# Patient Record
Sex: FEMALE | Race: WHITE | NOT HISPANIC OR LATINO | Employment: UNEMPLOYED | URBAN - METROPOLITAN AREA
[De-identification: names, ages, dates, MRNs, and addresses within clinical notes are randomized per-mention and may not be internally consistent; named-entity substitution may affect disease eponyms.]

---

## 2022-04-07 ENCOUNTER — HISTORICAL (OUTPATIENT)
Dept: ADMINISTRATIVE | Facility: HOSPITAL | Age: 57
End: 2022-04-07

## 2022-04-24 VITALS
BODY MASS INDEX: 25.58 KG/M2 | OXYGEN SATURATION: 98 % | DIASTOLIC BLOOD PRESSURE: 112 MMHG | WEIGHT: 139 LBS | HEIGHT: 62 IN | SYSTOLIC BLOOD PRESSURE: 160 MMHG

## 2023-10-13 DIAGNOSIS — C51.9 VULVAR CANCER: Primary | ICD-10-CM

## 2023-10-27 ENCOUNTER — TELEPHONE (OUTPATIENT)
Dept: HEMATOLOGY/ONCOLOGY | Facility: CLINIC | Age: 58
End: 2023-10-27
Payer: MEDICAID

## 2023-10-27 NOTE — NURSING
ROCKY Kay spoke with patient for pre-visit introduction and to assist with any questions or concerns. Discussed arrival time, location of clinic, and what to expect at consult visit. Provided contact number to call. Confirmed that patient has [employer] benefits and will bring her insurance card with her. No concerns or needs at this time. Confirmed that patient plans to attend appointment for 11/01/23 at 10:40 am.

## 2023-10-31 PROBLEM — Z80.42 FAMILY HISTORY OF PROSTATE CANCER IN FATHER: Status: ACTIVE | Noted: 2023-10-31

## 2023-10-31 PROBLEM — Z80.41 FAMILY HISTORY OF OVARIAN CANCER: Status: ACTIVE | Noted: 2023-10-31

## 2023-10-31 PROBLEM — Z72.0 TOBACCO ABUSE: Status: ACTIVE | Noted: 2023-10-31

## 2023-10-31 PROBLEM — Z90.710 HISTORY OF HYSTERECTOMY: Status: ACTIVE | Noted: 2023-10-31

## 2023-10-31 PROBLEM — E03.9 HYPOTHYROIDISM: Status: ACTIVE | Noted: 2023-10-31

## 2023-10-31 PROBLEM — C51.9 SQUAMOUS CELL CARCINOMA OF VULVA: Status: ACTIVE | Noted: 2023-10-31

## 2023-10-31 PROBLEM — Z86.39 HISTORY OF GRAVES' DISEASE: Status: ACTIVE | Noted: 2023-10-31

## 2023-10-31 PROBLEM — R59.0 INGUINAL LYMPHADENOPATHY: Status: ACTIVE | Noted: 2023-10-31

## 2023-10-31 NOTE — PROGRESS NOTES
History:  History reviewed. No pertinent past medical history.    Past Surgical History:   Procedure Laterality Date    HYSTERECTOMY        Social History     Socioeconomic History    Marital status:    Tobacco Use    Smoking status: Every Day     Types: Cigarettes    Smokeless tobacco: Never   Substance and Sexual Activity    Alcohol use: Not Currently    Drug use: Never    Sexual activity: Not Currently   Past medical history:  Rasheeda-Parkinson-White syndrome.  Graves disease, treated with radioactive iodine in 1999 or 2000, resulting in iatrogenic hypothyroidism; subsequently, on thyroid replacement therapy.  Hypertension.  Anxiety.  Tobacco abuse.  History of abnormal Pap smears in the past; no regular follow-up.  According to her, history of abnormal Pap smears, HPV 16 positive    Surgical/procedure history:  Hysterectomy for AUB/fibroids at age 37.     Social history:  Single.  Has 3 children.  Lives in [place], [place].  Used to work as a ; does not work anymore.  Has been smoking < half a pack of cigarettes daily for 10-15 years.  Used to drink 3 beers 3 X a week; now, down to or 3 beers once a week.  No illicit drugs.    Family history:  Daughter experienced ovarian cancer at age 31.    Health maintenance:  PCP in [place], [place].  -04/25/2023: Bilateral screening mammogram (comparison:  None):  BI-RADS 1-negative  -no screening colonoscopy ever.    Gyn history:  Pregnancy x3.  Vaginal birth x3.  Hysterectomy for AUB/fibroids at age 37.      History reviewed. No pertinent family history.     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        Oncologic/Hematologic History:  Oncology History   Squamous cell carcinoma of vulva   10/31/2023 Initial Diagnosis    Squamous cell carcinoma of vulva     10/31/2023 Cancer Staged    Staging form: Vulva, AJCC 8th Edition  - Clinical stage from 10/31/2023: FIGO Stage IIIB, calculated as Stage LINH (cT3, cN2b, cM0)     2023 -  Chemotherapy    Treatment Summary   Plan Name: OP GYN CISPLATIN WEEKLY  Treatment Goal: Curative  Status: Active  Start Date: 2023 (Planned)  End Date: 2023 (Planned)  Provider: Wai Verduzco MD  Chemotherapy: CISplatin (Platinol) 40 mg/m2 in sodium chloride 0.9% 250 mL chemo infusion, 40 mg/m2, Intravenous, Clinic/HOD 1 time, 0 of 7 cycles     58-year-old lady, referred by Dr. Harjinder Dumont, gyn onc, [facility], [place], [place], with squamous cell carcinoma of vulva.      09/15/2023:  Gyn office note (Jacinda Berger MD, gyn):  Left vulvar mass for 1 year; growth over last 6 months; pain; rubs on clothes  History of hysterectomy in the past  Noticed mass 1 year ago; was caring for her mother who was bedridden; now, ; noticed the mass growing; pain in the area; no problems with bowels or urination  History of hysterectomy for AUB/fibroids.  History of abnormal Pap smears for years.  Smoker.  Not sexually active in sometime.  Last gyn/ exam was shortly after hysterectomy at age 37.   Exam:  Friable, atypical borders, tender, entire left labia replaced by tumor/condylomatous appearance in several areas; right labia no lesion; left labia tenderness, lesions, and skin changes; inguinal lymphadenopathy present on the left side; no inguinal lymphadenopathy on the right side; unable to placed speculum in vagina due to tenderness at  enteritis      09/29/2023:  Dr. Harjinder Dumont, gyn onc office note (referred by Dr. Jacinda Berger, gyn):  Vaginal pain, 4/10  Left vulvar tender mass.  She has been providing care for her mother for the last year, and therefore, allowed the mass to grow without evaluation because of her focus on caregiving.  Gyn assessment 09/15/2023 showed a large mass suspicious for cancer.  Biopsy was done on 09/21/2023, revealing squamous cell carcinoma.  Other than the direct vulvar discomfort related to the mass, she denied significant bleeding, discharge, altered bladder or bowel function, leg swelling, weight loss, or anorexia.  The mass was quite tender and painful with sitting and activities, and very tender to touch.  No history of vulvar lesions or intraepithelial neoplasia  Physical exam (Dr. Harjinder Dumont):  Very large vulvar mass consistent with squamous cell carcinoma involving most of the mid and posterior left labial structures with areas of ulceration; is zone of induration around the mass that extends into the subcutaneous tissues; no fixation to the underlying fascia or bone; the mass extends around the perineal body to involve the right side of the perineal region; the distal vagina also appears involved by the tumor; the mass is difficult to assess because with the patient's tenderness; the mass also extends posteriorly to involve the left anterior portion of the anus; no extension into the deeper tissues; overall size 8 cm x 5 cm, and thickness difficult to clarify; vaginal exam was not feasible because of the size of tumor and the performed tenderness at the patient was experiencing  Assessment and plan (Dr. Harjinder Maynard):  Large squamous cell carcinoma of the vulva; T2 with involvement of the distal vagina and exterior anus; quite large; not usually considered best treated with surgery as the initial approach; standard therapy would be chemoradiation therapy; if there is any residual tumor remaining after  initial therapy, then, surgical resection would be considered.  Pelvic and vulvar MRI and PET-CT scan recommended.      Investigations reviewed:    2023:  Vulva, biopsy ([facility]):  -squamous cell carcinoma, extending to the deep and peripheral margins    10/20/2023: MRI pelvis with and without contrast (staging)   1. Biopsy-proven malignant left vulvar mass involves the the left perineum and lower two thirds of the vagina. Abutment of the anterior rectal wall is indeterminate for invasion.   2. Left inguinal adenopathy, compatible with metastatic disease  (enhancing soft tissue mass involving the left perineum, left vulva, and the lower 2/3 of vagina, measuring 8.0 x 2.5 by 5.4 cm; loss of normal fat plane between mass in the anterior rectum, indeterminate for invasion; no evidence of urethral invasion; 2 adjacent morphologically abnormal rounded and enlarged left inguinal lymph nodes present, largest 2.0 cm; a 3rd lymph node similar appearing the nonenlarged left inguinal lymph node)    10/31/2023: FDG PET-CT for stagin. Hypermetabolic activity at the bulb on volume portion of the vaginal canal although some of this activity may reflect urinary contamination. This is the site of the primary lesion.   2. Hypermetabolic adenopathy within the left inguinal node chain.   (No intrapelvic lymphadenopathy is seen. There is a large hypermetabolic lymph node within the left inguinal chain measuring 2.4 cm and SUV max of 3.4. A smaller hypermetabolic lymph node is seen superficially adjacent to it measuring SUV max 2.3. Hypermetabolic activity at the vulva involving a portion of the vaginal canal is identified, SUV max measuring 11.8. Some of this activity may reflect urinary contamination. The SUV max of the urinary bladder is 10.2.)    2023:   Pleasant lady who presents for initial medical oncology consultation, accompanied by her father.  In no acute discomfort.  Intermittent  pain in the left vulvar mass.  On Norco 7.5 mg p.o. PRN; she takes half a pill 3 X daily.  It was prescribed by Dr. Familia Red, radiation oncologist.  No ulceration over the mass.  Occasional spotting.  No vaginal bleeding.    Also has experienced itching over the vulvar mass.  Anorexia and 20 lb weight loss over last 3 months.  No unusual headaches, focal neurological symptoms, chest pain, cough, dyspnea, any other lumps or lymphadenopathy, abdominal pain, nausea, vomiting, any urinary or bowel symptoms, etc..  ECOG 0.    Interval History:  [No matching plan found]   OP GYN CISPLATIN WEEKLY     Medications:  Current Outpatient Medications on File Prior to Visit   Medication Sig Dispense Refill    ALPRAZolam (XANAX) 0.25 MG tablet Take 0.25 mg by mouth daily as needed.      diltiaZEM (CARDIZEM CD) 180 MG 24 hr capsule Take 180 mg by mouth.      HYDROcodone-acetaminophen (NORCO) 7.5-325 mg per tablet Take 1 tablet by mouth 4 (four) times daily as needed.      levothyroxine (SYNTHROID) 125 MCG tablet Take 125 mcg by mouth.      LIDOcaine (XYLOCAINE) 5 % Oint ointment Apply pea sized amount to affected area q4hrs prn vulvar pain      LIDOcaine-prilocaine (EMLA) cream SMARTSI Topical Daily      LIDOcaine-prilocaine (EMLA) cream Apply topically daily as needed.      lisinopriL (PRINIVIL,ZESTRIL) 5 MG tablet Take 5 mg by mouth.      lisinopriL 10 MG tablet Take 10 mg by mouth.      ALPRAZolam (XANAX) 0.25 MG tablet Take 1 tablet by mouth daily as needed.      amoxicillin-clavulanate 875-125mg (AUGMENTIN) 875-125 mg per tablet Take 1 tablet by mouth 2 (two) times daily.      diltiaZEM (CARDIZEM CD) 180 MG 24 hr capsule Take 1 capsule by mouth every morning.      HYDROcodone-acetaminophen (NORCO) 5-325 mg per tablet Take by mouth.      HYDROcodone-acetaminophen (NORCO) 5-325 mg per tablet Take 1 tablet by mouth every 4 (four) hours as needed.      levothyroxine (SYNTHROID) 125 MCG tablet Take 1 tablet by mouth every  morning.      levothyroxine sodium (SYNTHROID ORAL) Synthroid Take No date recorded No form recorded No frequency recorded No route recorded No set duration recorded No set duration amount recorded active No dosage strength recorded No dosage strength units of measure recorded      lisinopriL 10 MG tablet Take 10 mg by mouth.      VITAMIN D2 1,250 mcg (50,000 unit) capsule Take 50,000 Units by mouth every 7 days.       No current facility-administered medications on file prior to visit.       Review of Systems:   All systems reviewed and found to be negative except for the symptoms detailed above    Physical Examination:   VITAL SIGNS:   Vitals:    11/01/23 1104   BP: 105/67   Pulse: 62   Resp: 20   Temp: 97.8 °F (36.6 °C)     GENERAL:  In no apparent distress.    HEAD:  No signs of head trauma.  EYES:  Pupils are equal.  Extraocular motions intact.    EARS:  Hearing grossly intact.  MOUTH:  Oropharynx is normal.   NECK:  No adenopathy, no JVD.     CHEST:  Chest with clear breath sounds bilaterally.  No wheezes, rales, rhonchi.    CARDIAC:  Regular rate and rhythm.  S1 and S2, without murmurs, gallops, rubs.  VASCULAR:  No Edema.  Peripheral pulses normal and equal in all extremities.  ABDOMEN:  Soft, without detectable tenderness.  No sign of distention.  No   rebound or guarding, and no masses palpated.   Bowel Sounds normal.  MUSCULOSKELETAL:  Good range of motion of all major joints. Extremities without clubbing, cyanosis or edema.    NEUROLOGIC EXAM:  Alert and oriented x 3.  No focal sensory or strength deficits.   Speech normal.  Follows commands.  PSYCHIATRIC:  Mood normal.  -11/01/2023: I did not perform pelvic examination.    No results found for this or any previous visit.  No results found for this or any previous visit.    Assessment:  Problem List Items Addressed This Visit          Renal/    History of hysterectomy       Oncology    Squamous cell carcinoma of vulva    Family history of prostate  cancer in father    Family history of ovarian cancer       Endocrine    History of Graves' disease    Hypothyroidism       Other    Tobacco abuse    Inguinal lymphadenopathy     Other Visit Diagnoses       Vulvar cancer              Squamous cell carcinoma of vulva:  -presentation:  Left vulvar painful, tender mass for 1 year before seeking care in 09/2023  -vulvar biopsy 09/21/2023: Squamous cell carcinoma, extending to the deep and peripheral margins  -gyn exam 09/15/2023:  Friable, atypical borders, tender, entire left labia replaced by tumor/condylomatous appearance in several areas; left inguinal lymphadenopathy  -Gyn Onc exam 09/29/2023:  Very large vulvar mass involving the mid and posterior left labial structures with areas of ulceration, etc.; no fixation; distal vagina involved; involve the left anterior portion of the anus as well; no extension into deeper tissue; 8 cm x 5 cm;  -MRI pelvis with and without contrast 10/20/2023:  Left vulvar mass involving the left perineum and lower 2/3 of the vagina; 8.0 x 2.5 x 5.4 cm; no urethral invasion; abutment of the anterior rectal wall is indeterminate for invasion; left inguinal lymphadenopathy (2 abnormal lymph nodes, largest 2.0 cm; a 3rd lymph node similar appearance nonenlarged)  -PET-CT 10/31/2023:  No distant metastases; no intrapelvic lymphadenopathy; 2.4 cm hypermetabolic left inguinal lymph node; additionally, hypermetabolic lymph node adjacent to it as well; primary tumor, hypermetabolic, involving a portion of the vaginal canal)  >>>  -squamous cell carcinoma of the vulva (left labia)  -disease involving lower 2/3 of the vagina on MRI  -left inguinal lymphadenopathy (regional lymphadenopathy), 2.0 cm on MRI pelvis, 2.4 cm on PET-CT (> 5 mm), therefore, FIGO stage IIIB      Father experience prostate cancer at age 70   Daughter experienced ovarian cancer at age 31      History of hysterectomy at age 37 for AUB/uterine fibroids  History of Graves  disease, treated; subsequently, hypothyroid  History of tobacco abuse         Plan:  Check CBC, CMP, HIV screen  Please order genetic testidaughter experienced ovarian cancer at age 31)  Start Megace 400 mg p.o. q.day to boost appetite.  Follow-up in 1 month, after inguinofemoral lymphadenectomy by Dr. Harjinder Dumont.  ---------------------      -11/01/2023: Anorexia, 20 lb weight loss in last 3 months   -start Megace 400 mg p.o. q.day    -squamous cell carcinoma of the vulva (left labia)  -disease involving lower 2/3 of the vagina on MRI  -left inguinal lymphadenopathy (regional lymphadenopathy), 2.0 cm on MRI pelvis, 2.4 cm on PET-CT (> 5 mm), therefore, FIGO stage IIIB  -locally advanced disease; unresectable  -radiologically suspicious nodes (left inguinal lymphadenopathy on MRI and every PET-CT)    Primary treatment:   1. Inguinofemoral lymphadenectomy; if positive lymph nodes, then, EBRT +concurrent chemotherapy to primary tumor/inguinofemoral lymph node/pelvic lymph nodes  2. Inguinofemoral lymphadenectomy; if negative lymph node, then, EBRT +concurrent chemotherapy to primary tumor (+/- selective inguinofemoral lymph node coverage)  3. If inguinofemoral lymphadenectomy not performed then:  Consider FNA for enlarged lymph nodes, followed by EBRT +concurrent chemotherapy to primary tumor/inguinofemoral lymph nodes/pelvic lymph nodes    >>>  -11/01/2023: Message from Dr. Harjinder Dumont:  He wishes to perform inguinofemoral lymphadenectomy  -therefore, we will defer chemoradiation therapy until after she heals up from surgery  -we will see her back for follow-up in 1 month    Followed by evaluation of response to EBRT +concurrent chemotherapy:   Consider FDG PET-CT at 3-6 months to assess treatment response after definitive primary treatment  1. If clinically negative for residual tumor at primary site and nodes, then: Surveillance  2. If clinically negative for residual tumor at primary site and nodes, then:  Consider biopsy of tumor bed to confirm pathologic complete response (no sooner than 3 months after completion of treatment); if biopsy negative, then, surveillance; if biopsy positive, then, resection, followed by surveillance for negative margins, and consideration of additional surgery (consider pelvic exenteration for selected cases with a central recurrence), additional EBRT and/or systemic therapy for positive margins for invasive disease   3. If clinically positive for residual tumor at primary site and/or nodes, then:  -resection, followed by surveillance for negative margins  -resection, followed by additional surgery/additional EBRT, N/or systemic therapy for positive margins for invasive disease  -if unresectable, then, consider additional EBRT and/or systemic therapy or best supportive care      Surveillance:  1. Interval history and physical every 3-6 months for 2 years, then every 6-12 months for 3-5 years, then annually based on patient's risk of disease recurrence  2. Cervical/vaginal cytology screening for detection of lower genital tract neoplasia (may include HPV testing)  3. Imaging as indicated based on symptoms or examination findings suspicious for recurrence  4. Laboratory assessment as indicated based on symptoms or examination findings suspicious for recurrence  5. Patient Education regarding symptoms of potential recurrence and vulvar dystrophy, periodic self examinations, lifestyle, obesity, exercise, 6 health (including vaginal dilator use and lubricant/moisturizers), smoking cessation, nutrition counseling, and potential long-term and late effects of treatment     Preferred chemotherapy for chemoradiation therapy:  -cisplatin  -carboplatin if patient cisplatin intolerant  -other recommended regimens: Cisplatin/5 FU; capecitabine/mitomycin; gemcitabine; paclitaxel    Cisplatin, concurrent with radiotherapy:  Cisplatin 40 mg per m2 IV day 1, 7 day cycle x7 weeks with concurrent  radiotherapy    Monitor for hypersensitivity reactions, electrolyte abnormalities (hypomagnesemia, hypokalemia), renal dysfunction, peripheral neuropathy, ototoxicity.    Consider HIV testing    Imagin. Initial workup:  Chest x-ray/chest CT without contrast; pelvic MRI to aid in surgical and/or radiation treatment planning; consider neck/chest/abdomen/pelvis/groin FDG PET-CT or CT C/A/P with contrast for T2 or larger tumors or metastases suspected, etc.    Follow-up/surveillance imaging:  CT C/A/P with contrast or neck/chest/abdomen/pelvis/groin FDG PET-CT if recurrence/metastases suspected  Consider FDG PET-CT at 3-6 months to assess treatment response after definitive primary treatment    Imaging for suspected or documented recurrence:   Consider neck/chest/abdomen/pelvis/groin FDG PET-CT if not previously performed during surveillance  Consider pelvic MRI to aid in further treatment planning    Daughter experienced ovarian cancer age 31  >>>  -will consider genetic testing    History of tobacco abuse  -importance of complete and permanent abstinence discussed and strongly emphasized    History of Graves disease, treated with radioactive iodine in 1990, resulting in iatrogenic hypothyroidism; subsequently, on thyroid replacement therapy   Follow-up with PCP    Follow-up in 1 month, after inguinofemoral lymphadenectomy by Dr. Harjinder Dumont.    Above discussed at length with the patient.  All questions answered.  Discussed labs, scans, pathology report, and staging of vulvar cancer, and gave her copies of relevant records.    Plan of management discussed in detail.  Potential side effects of cisplatin discussed; [facility]  In due course, she will have chemotherapy teaching session with nursing staff, as well, before starting chemoradiation therapy.  Treatment will be deferred until after inguinofemoral lymphadenectomy by Dr. CAROLIN simms.  She understands and agrees with  this plan.    Follow-up:  No follow-ups on file.      Answers submitted by the patient for this visit:  Review of Systems Questionnaire (Submitted on 10/31/2023)  appetite change : No  unexpected weight change: No  mouth sores: No  visual disturbance: No  cough: No  shortness of breath: No  chest pain: No  abdominal pain: No  diarrhea: No  frequency: No  back pain: No  rash: No  headaches: No  adenopathy: No  nervous/ anxious: No

## 2023-11-01 ENCOUNTER — OFFICE VISIT (OUTPATIENT)
Dept: HEMATOLOGY/ONCOLOGY | Facility: CLINIC | Age: 58
End: 2023-11-01
Attending: INTERNAL MEDICINE
Payer: MEDICAID

## 2023-11-01 ENCOUNTER — DOCUMENTATION ONLY (OUTPATIENT)
Dept: HEMATOLOGY/ONCOLOGY | Facility: CLINIC | Age: 58
End: 2023-11-01

## 2023-11-01 VITALS
HEART RATE: 62 BPM | SYSTOLIC BLOOD PRESSURE: 105 MMHG | BODY MASS INDEX: 22.78 KG/M2 | HEIGHT: 62 IN | WEIGHT: 123.81 LBS | DIASTOLIC BLOOD PRESSURE: 67 MMHG | OXYGEN SATURATION: 100 % | RESPIRATION RATE: 20 BRPM | TEMPERATURE: 98 F

## 2023-11-01 DIAGNOSIS — Z80.41 FAMILY HISTORY OF OVARIAN CANCER: ICD-10-CM

## 2023-11-01 DIAGNOSIS — Z80.42 FAMILY HISTORY OF PROSTATE CANCER IN FATHER: ICD-10-CM

## 2023-11-01 DIAGNOSIS — Z86.39 HISTORY OF GRAVES' DISEASE: ICD-10-CM

## 2023-11-01 DIAGNOSIS — C51.9 VULVAR CANCER: ICD-10-CM

## 2023-11-01 DIAGNOSIS — R59.0 INGUINAL LYMPHADENOPATHY: ICD-10-CM

## 2023-11-01 DIAGNOSIS — C51.9 SQUAMOUS CELL CARCINOMA OF VULVA: Primary | ICD-10-CM

## 2023-11-01 DIAGNOSIS — Z72.0 TOBACCO ABUSE: ICD-10-CM

## 2023-11-01 DIAGNOSIS — Z90.710 HISTORY OF HYSTERECTOMY: ICD-10-CM

## 2023-11-01 DIAGNOSIS — R63.0 ANOREXIA: ICD-10-CM

## 2023-11-01 DIAGNOSIS — C51.9 SQUAMOUS CELL CARCINOMA OF VULVA: ICD-10-CM

## 2023-11-01 DIAGNOSIS — R63.4 UNINTENTIONAL WEIGHT LOSS: ICD-10-CM

## 2023-11-01 DIAGNOSIS — E03.9 HYPOTHYROIDISM, UNSPECIFIED TYPE: ICD-10-CM

## 2023-11-01 LAB
ALBUMIN SERPL-MCNC: 3.4 G/DL (ref 3.5–5)
ALBUMIN/GLOB SERPL: 0.9 RATIO (ref 1.1–2)
ALP SERPL-CCNC: 79 UNIT/L (ref 40–150)
ALT SERPL-CCNC: 7 UNIT/L (ref 0–55)
AST SERPL-CCNC: 11 UNIT/L (ref 5–34)
BASOPHILS # BLD AUTO: 0.09 X10(3)/MCL
BASOPHILS NFR BLD AUTO: 1.2 %
BILIRUB SERPL-MCNC: 0.4 MG/DL
BUN SERPL-MCNC: 6.4 MG/DL (ref 9.8–20.1)
CALCIUM SERPL-MCNC: 9.6 MG/DL (ref 8.4–10.2)
CHLORIDE SERPL-SCNC: 107 MMOL/L (ref 98–107)
CO2 SERPL-SCNC: 26 MMOL/L (ref 22–29)
CREAT SERPL-MCNC: 0.77 MG/DL (ref 0.55–1.02)
EOSINOPHIL # BLD AUTO: 0.29 X10(3)/MCL (ref 0–0.9)
EOSINOPHIL NFR BLD AUTO: 3.9 %
ERYTHROCYTE [DISTWIDTH] IN BLOOD BY AUTOMATED COUNT: 13.2 % (ref 11.5–17)
GFR SERPLBLD CREATININE-BSD FMLA CKD-EPI: >60 MLS/MIN/1.73/M2
GLOBULIN SER-MCNC: 3.9 GM/DL (ref 2.4–3.5)
GLUCOSE SERPL-MCNC: 84 MG/DL (ref 74–100)
HCT VFR BLD AUTO: 43.6 % (ref 37–47)
HGB BLD-MCNC: 13.7 G/DL (ref 12–16)
HIV 1+2 AB+HIV1 P24 AG SERPL QL IA: NONREACTIVE
IMM GRANULOCYTES # BLD AUTO: 0.03 X10(3)/MCL (ref 0–0.04)
IMM GRANULOCYTES NFR BLD AUTO: 0.4 %
LYMPHOCYTES # BLD AUTO: 1.63 X10(3)/MCL (ref 0.6–4.6)
LYMPHOCYTES NFR BLD AUTO: 21.7 %
MCH RBC QN AUTO: 29.2 PG (ref 27–31)
MCHC RBC AUTO-ENTMCNC: 31.4 G/DL (ref 33–36)
MCV RBC AUTO: 93 FL (ref 80–94)
MONOCYTES # BLD AUTO: 0.72 X10(3)/MCL (ref 0.1–1.3)
MONOCYTES NFR BLD AUTO: 9.6 %
NEUTROPHILS # BLD AUTO: 4.76 X10(3)/MCL (ref 2.1–9.2)
NEUTROPHILS NFR BLD AUTO: 63.2 %
NRBC BLD AUTO-RTO: 0 %
PLATELET # BLD AUTO: 374 X10(3)/MCL (ref 130–400)
PMV BLD AUTO: 8.8 FL (ref 7.4–10.4)
POTASSIUM SERPL-SCNC: 4.5 MMOL/L (ref 3.5–5.1)
PROT SERPL-MCNC: 7.3 GM/DL (ref 6.4–8.3)
RBC # BLD AUTO: 4.69 X10(6)/MCL (ref 4.2–5.4)
SODIUM SERPL-SCNC: 140 MMOL/L (ref 136–145)
WBC # SPEC AUTO: 7.52 X10(3)/MCL (ref 4.5–11.5)

## 2023-11-01 PROCEDURE — 3074F SYST BP LT 130 MM HG: CPT | Mod: CPTII,,, | Performed by: INTERNAL MEDICINE

## 2023-11-01 PROCEDURE — 3078F PR MOST RECENT DIASTOLIC BLOOD PRESSURE < 80 MM HG: ICD-10-PCS | Mod: CPTII,,, | Performed by: INTERNAL MEDICINE

## 2023-11-01 PROCEDURE — 1159F PR MEDICATION LIST DOCUMENTED IN MEDICAL RECORD: ICD-10-PCS | Mod: CPTII,,, | Performed by: INTERNAL MEDICINE

## 2023-11-01 PROCEDURE — 1160F PR REVIEW ALL MEDS BY PRESCRIBER/CLIN PHARMACIST DOCUMENTED: ICD-10-PCS | Mod: CPTII,,, | Performed by: INTERNAL MEDICINE

## 2023-11-01 PROCEDURE — 99205 OFFICE O/P NEW HI 60 MIN: CPT | Mod: S$PBB,,, | Performed by: INTERNAL MEDICINE

## 2023-11-01 PROCEDURE — 4010F PR ACE/ARB THEARPY RXD/TAKEN: ICD-10-PCS | Mod: CPTII,,, | Performed by: INTERNAL MEDICINE

## 2023-11-01 PROCEDURE — 3008F BODY MASS INDEX DOCD: CPT | Mod: CPTII,,, | Performed by: INTERNAL MEDICINE

## 2023-11-01 PROCEDURE — 1160F RVW MEDS BY RX/DR IN RCRD: CPT | Mod: CPTII,,, | Performed by: INTERNAL MEDICINE

## 2023-11-01 PROCEDURE — 1159F MED LIST DOCD IN RCRD: CPT | Mod: CPTII,,, | Performed by: INTERNAL MEDICINE

## 2023-11-01 PROCEDURE — 3008F PR BODY MASS INDEX (BMI) DOCUMENTED: ICD-10-PCS | Mod: CPTII,,, | Performed by: INTERNAL MEDICINE

## 2023-11-01 PROCEDURE — 85025 COMPLETE CBC W/AUTO DIFF WBC: CPT | Performed by: INTERNAL MEDICINE

## 2023-11-01 PROCEDURE — 4010F ACE/ARB THERAPY RXD/TAKEN: CPT | Mod: CPTII,,, | Performed by: INTERNAL MEDICINE

## 2023-11-01 PROCEDURE — 99214 OFFICE O/P EST MOD 30 MIN: CPT | Mod: PBBFAC | Performed by: INTERNAL MEDICINE

## 2023-11-01 PROCEDURE — 80053 COMPREHEN METABOLIC PANEL: CPT | Performed by: INTERNAL MEDICINE

## 2023-11-01 PROCEDURE — 3074F PR MOST RECENT SYSTOLIC BLOOD PRESSURE < 130 MM HG: ICD-10-PCS | Mod: CPTII,,, | Performed by: INTERNAL MEDICINE

## 2023-11-01 PROCEDURE — 99205 PR OFFICE/OUTPT VISIT, NEW, LEVL V, 60-74 MIN: ICD-10-PCS | Mod: S$PBB,,, | Performed by: INTERNAL MEDICINE

## 2023-11-01 PROCEDURE — 3078F DIAST BP <80 MM HG: CPT | Mod: CPTII,,, | Performed by: INTERNAL MEDICINE

## 2023-11-01 PROCEDURE — 87389 HIV-1 AG W/HIV-1&-2 AB AG IA: CPT | Performed by: INTERNAL MEDICINE

## 2023-11-01 PROCEDURE — 36415 COLL VENOUS BLD VENIPUNCTURE: CPT | Performed by: INTERNAL MEDICINE

## 2023-11-01 RX ORDER — LEVOTHYROXINE SODIUM 125 UG/1
125 TABLET ORAL
COMMUNITY
Start: 2023-10-31 | End: 2024-03-07

## 2023-11-01 RX ORDER — ERGOCALCIFEROL 1.25 MG/1
50000 CAPSULE ORAL
COMMUNITY
Start: 2023-06-23 | End: 2024-03-07

## 2023-11-01 RX ORDER — LIDOCAINE AND PRILOCAINE 25; 25 MG/G; MG/G
CREAM TOPICAL DAILY PRN
COMMUNITY
Start: 2023-09-15 | End: 2024-03-07 | Stop reason: SDUPTHER

## 2023-11-01 RX ORDER — ALPRAZOLAM 0.25 MG/1
0.25 TABLET ORAL DAILY PRN
COMMUNITY
Start: 2023-09-26 | End: 2024-03-07

## 2023-11-01 RX ORDER — LIDOCAINE 50 MG/G
OINTMENT TOPICAL
COMMUNITY
Start: 2023-09-15

## 2023-11-01 RX ORDER — LISINOPRIL 5 MG/1
5 TABLET ORAL
COMMUNITY
Start: 2023-05-23 | End: 2024-03-07 | Stop reason: SDUPTHER

## 2023-11-01 RX ORDER — LIDOCAINE AND PRILOCAINE 25; 25 MG/G; MG/G
CREAM TOPICAL
COMMUNITY
Start: 2023-09-15 | End: 2024-03-07 | Stop reason: SDUPTHER

## 2023-11-01 RX ORDER — HYDROCODONE BITARTRATE AND ACETAMINOPHEN 5; 325 MG/1; MG/1
TABLET ORAL
COMMUNITY
Start: 2023-09-26 | End: 2024-03-07

## 2023-11-01 RX ORDER — DILTIAZEM HYDROCHLORIDE 180 MG/1
1 CAPSULE, COATED, EXTENDED RELEASE ORAL EVERY MORNING
COMMUNITY
Start: 2023-10-05 | End: 2024-03-07

## 2023-11-01 RX ORDER — LEVOTHYROXINE SODIUM 125 UG/1
1 TABLET ORAL EVERY MORNING
COMMUNITY
Start: 2023-09-01

## 2023-11-01 RX ORDER — LISINOPRIL 10 MG/1
10 TABLET ORAL
COMMUNITY
Start: 2023-10-31

## 2023-11-01 RX ORDER — ALPRAZOLAM 0.25 MG/1
1 TABLET ORAL DAILY PRN
COMMUNITY
Start: 2023-09-01

## 2023-11-01 RX ORDER — DILTIAZEM HYDROCHLORIDE 180 MG/1
180 CAPSULE, COATED, EXTENDED RELEASE ORAL
COMMUNITY
Start: 2023-10-05

## 2023-11-01 RX ORDER — LISINOPRIL 10 MG/1
10 TABLET ORAL
COMMUNITY
Start: 2023-08-16 | End: 2024-02-12

## 2023-11-01 RX ORDER — AMOXICILLIN AND CLAVULANATE POTASSIUM 875; 125 MG/1; MG/1
1 TABLET, FILM COATED ORAL 2 TIMES DAILY
COMMUNITY
Start: 2023-09-21 | End: 2024-03-07 | Stop reason: ALTCHOICE

## 2023-11-01 RX ORDER — HYDROCODONE BITARTRATE AND ACETAMINOPHEN 5; 325 MG/1; MG/1
1 TABLET ORAL EVERY 4 HOURS PRN
COMMUNITY
Start: 2023-09-26 | End: 2024-03-07

## 2023-11-01 RX ORDER — HYDROCODONE BITARTRATE AND ACETAMINOPHEN 7.5; 325 MG/1; MG/1
1 TABLET ORAL 4 TIMES DAILY PRN
COMMUNITY
Start: 2023-10-24 | End: 2024-03-07

## 2023-11-01 NOTE — Clinical Note
Please note patient's father does not have prostate cancer  Please arrange for genetic testing/counseling anyway because daughter as history of ovarian cancers  Start Megace 400 mg p.o. q.day to boost appetite.

## 2023-11-01 NOTE — NURSING
Met with patient today after her consult appointment with Dr. Verduzco. Patient attended appointment accompanied by her father. Provided patient with RN Rahul contact and explained role in patient's care. NCCN Distress Screen completed with a reported distress score of 8. Patient indicates her distress is related to diagnosis, surgery and treatment. Social support reported as very good. Provided cancer center packet with clinic team, contact numbers, and information on cancer treatment.  Informed of [facility] and [employer] referral that she may need to utilize for nutritional supplements. Patient verbalized consent.  Patient verbalized understanding and agrees to contact ROCKY Kay if any needs or concerns arise.     Oncology Navigation   Intake  Cancer Type: Gynecologic  Initial Nurse Navigator Contact: 10/27/23  Date Worked: 23  Appointment Date: 23     Treatment  Current Status: Active    Surgical Oncologist: Dr. Harjinder Dumont  Type of Surgery: inguinofemoral lymphadenectomy    Medical Oncologist: Wai Verduzco MD  Consult Date: 23  Chemotherapy: Planned  Chemotherapy Regimen: we will defer chemoradiation therapy until after she heals up from surgery          General Referrals: [facility]          Support Systems: Spouse/significant other; Parent; Children; Family members; Friends / neighbors     Acuity  Stage: 2  Systemic Treatment - predicted or initiated: More than one treatment modality concurrently (chemotherapy, radiation, etc.) (+2)  Surgical Procedure Complexity: 1  Treatment Tolerability: Has not started treatment yet/treatment fully completed and side effects resolved  ECO  Comorbidities in Medical History: 2   Needed: 0  Support: 0  Verbalizes Financial Concerns: 0  Transportation: 0  Mental Health: PHQ Score: 0  History of noncompliance/frequent no shows and cancellations: 0  Verbalizes the need for more education: 0  Navigation Acuity:  7     Follow Up  No follow-ups on file.

## 2023-11-01 NOTE — Clinical Note
Check CBC, CMP, HIV screen Please order genetic testing (father experience prostate cancer at age 70; daughter experienced ovarian cancer at age 31) Follow-up in 1 month, after inguinofemoral lymphadenectomy by Dr. Harjinder Dumont.

## 2023-11-07 RX ORDER — MEGESTROL ACETATE 40 MG/ML
400 SUSPENSION ORAL DAILY
Qty: 300 ML | Refills: 11 | Status: SHIPPED | OUTPATIENT
Start: 2023-11-07 | End: 2024-03-07

## 2023-11-14 ENCOUNTER — DOCUMENTATION ONLY (OUTPATIENT)
Dept: HEMATOLOGY/ONCOLOGY | Facility: CLINIC | Age: 58
End: 2023-11-14
Payer: MEDICAID

## 2023-11-14 NOTE — PROGRESS NOTES
Received call from Dr Harjinder Dumont' nurse, Sharri, reporting that Dr. Dumont has reviewed patient's imaging and recommending to proceed with chemoradiation treatment. States that if lymph nodes and vulva show persistent disease after treatment, then Dr. Dumont will plan for surgical resection. Radiation oncology ([facility]) saw patient today.      Patient's scheduled appt with Dr. Verduzco on 12/4/23.     Dr. Verduzco, please advise if you wish to see patient sooner.

## 2023-11-14 NOTE — NURSING
Received call from Dr Harjinder Dumont' nurse, Sharri, reporting that Dr. Dumont has reviewed patient's imaging and recommending to proceed with chemoradiation treatment. States that if lymph nodes and vulva show persistent disease after treatment, then Dr. Dumont will plan for surgical resection. Radiation oncology ([facility]) saw patient today.     Patient's scheduled appt with Dr. Verduzco on 12/4/23.    Dr. Verduzco, please advise if you wish to see patient sooner.

## 2023-11-20 RX ORDER — HEPARIN 100 UNIT/ML
500 SYRINGE INTRAVENOUS
Status: CANCELLED | OUTPATIENT
Start: 2023-12-21

## 2023-11-20 RX ORDER — HEPARIN 100 UNIT/ML
500 SYRINGE INTRAVENOUS
Status: CANCELLED | OUTPATIENT
Start: 2023-11-30

## 2023-11-20 RX ORDER — HEPARIN 100 UNIT/ML
500 SYRINGE INTRAVENOUS
Status: CANCELLED | OUTPATIENT
Start: 2023-12-14

## 2023-11-20 RX ORDER — HEPARIN 100 UNIT/ML
500 SYRINGE INTRAVENOUS
Status: CANCELLED | OUTPATIENT
Start: 2023-12-07

## 2023-11-20 RX ORDER — SODIUM CHLORIDE 0.9 % (FLUSH) 0.9 %
10 SYRINGE (ML) INJECTION
Status: CANCELLED | OUTPATIENT
Start: 2023-12-21

## 2023-11-20 RX ORDER — SODIUM CHLORIDE 0.9 % (FLUSH) 0.9 %
10 SYRINGE (ML) INJECTION
Status: CANCELLED | OUTPATIENT
Start: 2023-11-30

## 2023-11-20 RX ORDER — SODIUM CHLORIDE 0.9 % (FLUSH) 0.9 %
10 SYRINGE (ML) INJECTION
Status: CANCELLED | OUTPATIENT
Start: 2023-12-07

## 2023-11-20 RX ORDER — SODIUM CHLORIDE 0.9 % (FLUSH) 0.9 %
10 SYRINGE (ML) INJECTION
Status: CANCELLED | OUTPATIENT
Start: 2023-12-14

## 2023-11-21 ENCOUNTER — DOCUMENTATION ONLY (OUTPATIENT)
Dept: HEMATOLOGY/ONCOLOGY | Facility: CLINIC | Age: 58
End: 2023-11-21

## 2023-11-21 ENCOUNTER — OFFICE VISIT (OUTPATIENT)
Dept: HEMATOLOGY/ONCOLOGY | Facility: CLINIC | Age: 58
End: 2023-11-21
Attending: INTERNAL MEDICINE
Payer: MEDICAID

## 2023-11-21 VITALS
WEIGHT: 128.63 LBS | TEMPERATURE: 98 F | OXYGEN SATURATION: 98 % | HEART RATE: 68 BPM | BODY MASS INDEX: 23.67 KG/M2 | RESPIRATION RATE: 20 BRPM | SYSTOLIC BLOOD PRESSURE: 111 MMHG | DIASTOLIC BLOOD PRESSURE: 66 MMHG

## 2023-11-21 DIAGNOSIS — R63.4 UNINTENTIONAL WEIGHT LOSS: ICD-10-CM

## 2023-11-21 DIAGNOSIS — C51.9 SQUAMOUS CELL CARCINOMA OF VULVA: Primary | ICD-10-CM

## 2023-11-21 DIAGNOSIS — R59.0 INGUINAL LYMPHADENOPATHY: ICD-10-CM

## 2023-11-21 DIAGNOSIS — R10.2 PAIN IN VULVA: ICD-10-CM

## 2023-11-21 DIAGNOSIS — Z72.0 TOBACCO ABUSE: ICD-10-CM

## 2023-11-21 DIAGNOSIS — Z90.710 HISTORY OF HYSTERECTOMY: Primary | ICD-10-CM

## 2023-11-21 DIAGNOSIS — Z90.710 HISTORY OF HYSTERECTOMY: ICD-10-CM

## 2023-11-21 DIAGNOSIS — R63.0 ANOREXIA: ICD-10-CM

## 2023-11-21 DIAGNOSIS — C51.9 SQUAMOUS CELL CARCINOMA OF VULVA: ICD-10-CM

## 2023-11-21 DIAGNOSIS — Z80.42 FAMILY HISTORY OF PROSTATE CANCER IN FATHER: ICD-10-CM

## 2023-11-21 DIAGNOSIS — Z86.39 HISTORY OF GRAVES' DISEASE: ICD-10-CM

## 2023-11-21 DIAGNOSIS — Z80.41 FAMILY HISTORY OF OVARIAN CANCER: ICD-10-CM

## 2023-11-21 PROCEDURE — 1160F PR REVIEW ALL MEDS BY PRESCRIBER/CLIN PHARMACIST DOCUMENTED: ICD-10-PCS | Mod: CPTII,,, | Performed by: INTERNAL MEDICINE

## 2023-11-21 PROCEDURE — 3078F DIAST BP <80 MM HG: CPT | Mod: CPTII,,, | Performed by: INTERNAL MEDICINE

## 2023-11-21 PROCEDURE — 99214 PR OFFICE/OUTPT VISIT, EST, LEVL IV, 30-39 MIN: ICD-10-PCS | Mod: S$PBB,,, | Performed by: INTERNAL MEDICINE

## 2023-11-21 PROCEDURE — 3078F PR MOST RECENT DIASTOLIC BLOOD PRESSURE < 80 MM HG: ICD-10-PCS | Mod: CPTII,,, | Performed by: INTERNAL MEDICINE

## 2023-11-21 PROCEDURE — 4010F PR ACE/ARB THEARPY RXD/TAKEN: ICD-10-PCS | Mod: CPTII,,, | Performed by: INTERNAL MEDICINE

## 2023-11-21 PROCEDURE — 99214 OFFICE O/P EST MOD 30 MIN: CPT | Mod: S$PBB,,, | Performed by: INTERNAL MEDICINE

## 2023-11-21 PROCEDURE — 1160F RVW MEDS BY RX/DR IN RCRD: CPT | Mod: CPTII,,, | Performed by: INTERNAL MEDICINE

## 2023-11-21 PROCEDURE — 3008F BODY MASS INDEX DOCD: CPT | Mod: CPTII,,, | Performed by: INTERNAL MEDICINE

## 2023-11-21 PROCEDURE — 1159F MED LIST DOCD IN RCRD: CPT | Mod: CPTII,,, | Performed by: INTERNAL MEDICINE

## 2023-11-21 PROCEDURE — 1159F PR MEDICATION LIST DOCUMENTED IN MEDICAL RECORD: ICD-10-PCS | Mod: CPTII,,, | Performed by: INTERNAL MEDICINE

## 2023-11-21 PROCEDURE — 3008F PR BODY MASS INDEX (BMI) DOCUMENTED: ICD-10-PCS | Mod: CPTII,,, | Performed by: INTERNAL MEDICINE

## 2023-11-21 PROCEDURE — 3074F PR MOST RECENT SYSTOLIC BLOOD PRESSURE < 130 MM HG: ICD-10-PCS | Mod: CPTII,,, | Performed by: INTERNAL MEDICINE

## 2023-11-21 PROCEDURE — 4010F ACE/ARB THERAPY RXD/TAKEN: CPT | Mod: CPTII,,, | Performed by: INTERNAL MEDICINE

## 2023-11-21 PROCEDURE — 99215 OFFICE O/P EST HI 40 MIN: CPT | Mod: PBBFAC | Performed by: INTERNAL MEDICINE

## 2023-11-21 PROCEDURE — 3074F SYST BP LT 130 MM HG: CPT | Mod: CPTII,,, | Performed by: INTERNAL MEDICINE

## 2023-11-21 RX ORDER — OXYCODONE AND ACETAMINOPHEN 10; 325 MG/1; MG/1
1 TABLET ORAL EVERY 4 HOURS PRN
COMMUNITY
Start: 2023-11-15 | End: 2023-12-07 | Stop reason: SDUPTHER

## 2023-11-21 NOTE — PROGRESS NOTES
History:  History reviewed. No pertinent past medical history.    Past Surgical History:   Procedure Laterality Date    HYSTERECTOMY        Social History     Socioeconomic History    Marital status:    Tobacco Use    Smoking status: Every Day     Types: Cigarettes    Smokeless tobacco: Never   Substance and Sexual Activity    Alcohol use: Not Currently    Drug use: Never    Sexual activity: Not Currently   Past medical history:  Rasheeda-Parkinson-White syndrome.  Graves disease, treated with radioactive iodine in 1999 or 2000, resulting in iatrogenic hypothyroidism; subsequently, on thyroid replacement therapy.  Hypertension.  Anxiety.  Tobacco abuse.  History of abnormal Pap smears in the past; no regular follow-up.  According to her, history of abnormal Pap smears, HPV 16 positive    Surgical/procedure history:  Hysterectomy for AUB/fibroids at age 37.     Social history:  Single.  Has 3 children.  Lives in [place], [place].  Used to work as a ; does not work anymore.  Has been smoking < half a pack of cigarettes daily for 10-15 years.  Used to drink 3 beers 3 X a week; now, down to or 3 beers once a week.  No illicit drugs.    Family history:  Daughter experienced ovarian cancer at age 31.    Health maintenance:  PCP in [place], [place].  -04/25/2023: Bilateral screening mammogram (comparison:  None):  BI-RADS 1-negative  -no screening colonoscopy ever.    Gyn history:  Pregnancy x3.  Vaginal birth x3.  Hysterectomy for AUB/fibroids at age 37.      History reviewed. No pertinent family history.     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), biopsy 09/21/2023, MRI pelvis 10/20/2023, FDG PET-CT 10/31/2023, disease involving lower 2/3 of vagina on MRI, left inguinal lymphadenopathy on MRI and PET-CT (2.0 cm and 2.4 cm, respectively), FIGO stage IIIB  -father experienced prostate cancer at age 70   -daughter experienced ovarian cancer at  age 31  -history of tobacco abuse  -Graves disease, hypothyroidism  -history of hysterectomy for AUB/uterine fibroids at age 37    History of Present Illness:   Squamous Cell Carcinoma of Vulva (Pt no concerns.)        Oncologic/Hematologic History:  Oncology History   Squamous cell carcinoma of vulva   10/31/2023 Initial Diagnosis    Squamous cell carcinoma of vulva     10/31/2023 Cancer Staged    Staging form: Vulva, AJCC 8th Edition  - Clinical stage from 10/31/2023: FIGO Stage IIIB, calculated as Stage LINH (cT3, cN2b, cM0)     2023 -  Chemotherapy    Treatment Summary   Plan Name: OP GYN CISPLATIN WEEKLY  Treatment Goal: Curative  Status: Active  Start Date: 2023 (Planned)  End Date: 2023 (Planned)  Provider: Wai Verduzco MD  Chemotherapy: CISplatin (Platinol) in sodium chloride 0.9% 313 mL chemo infusion, 40 mg/m2 = 63 mg, Intravenous, Clinic/HOD 1 time, 0 of 7 cycles     58-year-old lady, referred by Dr. Harjinder Dumont, gyn onc, [facility], [place], [place], with squamous cell carcinoma of vulva.      09/15/2023:  Gyn office note (Jacinda Berger MD, gyn):  Left vulvar mass for 1 year; growth over last 6 months; pain; rubs on clothes  History of hysterectomy in the past  Noticed mass 1 year ago; was caring for her mother who was bedridden; now, ; noticed the mass growing; pain in the area; no problems with bowels or urination  History of hysterectomy for AUB/fibroids.  History of abnormal Pap smears for years.  Smoker.  Not sexually active in sometime.  Last gyn/ exam was shortly after hysterectomy at age 37.   Exam:  Friable, atypical borders, tender, entire left labia replaced by tumor/condylomatous appearance in several areas; right labia no lesion; left labia tenderness, lesions, and skin changes; inguinal lymphadenopathy present on the left side; no inguinal lymphadenopathy on the right side; unable to placed speculum in vagina due to tenderness  at enteritis      09/29/2023:  Dr. Harjinder Dumont, gyn onc office note (referred by Dr. Jacinda Berger, gyn):  Vaginal pain, 4/10  Left vulvar tender mass.  She has been providing care for her mother for the last year, and therefore, allowed the mass to grow without evaluation because of her focus on caregiving.  Gyn assessment 09/15/2023 showed a large mass suspicious for cancer.  Biopsy was done on 09/21/2023, revealing squamous cell carcinoma.  Other than the direct vulvar discomfort related to the mass, she denied significant bleeding, discharge, altered bladder or bowel function, leg swelling, weight loss, or anorexia.  The mass was quite tender and painful with sitting and activities, and very tender to touch.  No history of vulvar lesions or intraepithelial neoplasia  Physical exam (Dr. Harjinder Dumont):  Very large vulvar mass consistent with squamous cell carcinoma involving most of the mid and posterior left labial structures with areas of ulceration; is zone of induration around the mass that extends into the subcutaneous tissues; no fixation to the underlying fascia or bone; the mass extends around the perineal body to involve the right side of the perineal region; the distal vagina also appears involved by the tumor; the mass is difficult to assess because with the patient's tenderness; the mass also extends posteriorly to involve the left anterior portion of the anus; no extension into the deeper tissues; overall size 8 cm x 5 cm, and thickness difficult to clarify; vaginal exam was not feasible because of the size of tumor and the performed tenderness at the patient was experiencing  Assessment and plan (Dr. Harjinder Maynard):  Large squamous cell carcinoma of the vulva; T2 with involvement of the distal vagina and exterior anus; quite large; not usually considered best treated with surgery as the initial approach; standard therapy would be chemoradiation therapy; if there is any residual tumor remaining  after initial therapy, then, surgical resection would be considered.  Pelvic and vulvar MRI and PET-CT scan recommended.      Investigations reviewed:    2023:  Vulva, biopsy ([facility]):  -squamous cell carcinoma, extending to the deep and peripheral margins    10/20/2023: MRI pelvis with and without contrast (staging)   1. Biopsy-proven malignant left vulvar mass involves the the left perineum and lower two thirds of the vagina. Abutment of the anterior rectal wall is indeterminate for invasion.   2. Left inguinal adenopathy, compatible with metastatic disease  (enhancing soft tissue mass involving the left perineum, left vulva, and the lower 2/3 of vagina, measuring 8.0 x 2.5 by 5.4 cm; loss of normal fat plane between mass in the anterior rectum, indeterminate for invasion; no evidence of urethral invasion; 2 adjacent morphologically abnormal rounded and enlarged left inguinal lymph nodes present, largest 2.0 cm; a 3rd lymph node similar appearing the nonenlarged left inguinal lymph node)    10/31/2023: FDG PET-CT for stagin. Hypermetabolic activity at the bulb on volume portion of the vaginal canal although some of this activity may reflect urinary contamination. This is the site of the primary lesion.   2. Hypermetabolic adenopathy within the left inguinal node chain.   (No intrapelvic lymphadenopathy is seen. There is a large hypermetabolic lymph node within the left inguinal chain measuring 2.4 cm and SUV max of 3.4. A smaller hypermetabolic lymph node is seen superficially adjacent to it measuring SUV max 2.3. Hypermetabolic activity at the vulva involving a portion of the vaginal canal is identified, SUV max measuring 11.8. Some of this activity may reflect urinary contamination. The SUV max of the urinary bladder is 10.2.)    2023:   Pleasant lady who presents for initial medical oncology consultation, accompanied by her father.  In no acute  discomfort.  Intermittent pain in the left vulvar mass.  On Norco 7.5 mg p.o. PRN; she takes half a pill 3 X daily.  It was prescribed by Dr. Familia Red, radiation oncologist.  No ulceration over the mass.  Occasional spotting.  No vaginal bleeding.    Also has experienced itching over the vulvar mass.  Anorexia and 20 lb weight loss over last 3 months.  No unusual headaches, focal neurological symptoms, chest pain, cough, dyspnea, any other lumps or lymphadenopathy, abdominal pain, nausea, vomiting, any urinary or bowel symptoms, etc..  ECOG 0.  Presents for a follow-up visit, accompanied by a male family member, probably .  No bleeding or discharge from vulvar lesion.  Variable degree of pain related to overall cancer, wearing in severity from 06/10 to 8/10.  Takes Norco, prescribed by Radiation Oncology.  Appetite is okay.  No chest pain, cough, dyspnea, abdominal pain, nausea, vomiting, anorexia, any bowel complaints, etc..  ECOG 0-1.  Anxiously awaiting start of chemoradiation therapy.    Interval History:  [No matching plan found]   OP GYN CISPLATIN WEEKLY     11/21/2023:   -11/01/2023: CBC normal; hemoglobin 13.7; CMP unremarkable; HIV nonreactive  -11/14/2023: Message from Dr. Harjinder Maynard's office:  Recommendation to proceed with chemoradiation therapy; if persistent disease after treatment, then, will plan surgical resection    Medications:  Current Outpatient Medications on File Prior to Visit   Medication Sig Dispense Refill    ALPRAZolam (XANAX) 0.25 MG tablet Take 0.25 mg by mouth daily as needed.      amoxicillin-clavulanate 875-125mg (AUGMENTIN) 875-125 mg per tablet Take 1 tablet by mouth 2 (two) times daily.      diltiaZEM (CARDIZEM CD) 180 MG 24 hr capsule Take 180 mg by mouth.      HYDROcodone-acetaminophen (NORCO) 5-325 mg per tablet Take by mouth.      levothyroxine (SYNTHROID) 125 MCG tablet Take 125 mcg by mouth.      levothyroxine sodium (SYNTHROID ORAL) Synthroid Take No date  recorded No form recorded No frequency recorded No route recorded No set duration recorded No set duration amount recorded active No dosage strength recorded No dosage strength units of measure recorded      LIDOcaine (XYLOCAINE) 5 % Oint ointment Apply pea sized amount to affected area q4hrs prn vulvar pain      lisinopriL 10 MG tablet Take 10 mg by mouth.      megestroL (MEGACE) 400 mg/10 mL (40 mg/mL) Susp Take 10 mLs (400 mg total) by mouth once daily. 300 mL 11    oxyCODONE-acetaminophen (PERCOCET)  mg per tablet Take 1 tablet by mouth every 4 (four) hours as needed.      VITAMIN D2 1,250 mcg (50,000 unit) capsule Take 50,000 Units by mouth every 7 days.      ALPRAZolam (XANAX) 0.25 MG tablet Take 1 tablet by mouth daily as needed.      diltiaZEM (CARDIZEM CD) 180 MG 24 hr capsule Take 1 capsule by mouth every morning.      HYDROcodone-acetaminophen (NORCO) 5-325 mg per tablet Take 1 tablet by mouth every 4 (four) hours as needed.      HYDROcodone-acetaminophen (NORCO) 7.5-325 mg per tablet Take 1 tablet by mouth 4 (four) times daily as needed.      levothyroxine (SYNTHROID) 125 MCG tablet Take 1 tablet by mouth every morning.      LIDOcaine-prilocaine (EMLA) cream SMARTSI Topical Daily      LIDOcaine-prilocaine (EMLA) cream Apply topically daily as needed.      lisinopriL (PRINIVIL,ZESTRIL) 5 MG tablet Take 5 mg by mouth.      lisinopriL 10 MG tablet Take 10 mg by mouth.       No current facility-administered medications on file prior to visit.       Review of Systems:   All systems reviewed and found to be negative except for the symptoms detailed above    Physical Examination:   VITAL SIGNS:   Vitals:    23 0819   BP: 111/66   Pulse: 68   Resp: 20   Temp: 98.1 °F (36.7 °C)       GENERAL:  In no apparent distress.    HEAD:  No signs of head trauma.  EYES:  Pupils are equal.  Extraocular motions intact.    EARS:  Hearing grossly intact.  MOUTH:  Oropharynx is normal.   NECK:  No adenopathy, no  JVD.     CHEST:  Chest with clear breath sounds bilaterally.  No wheezes, rales, rhonchi.    CARDIAC:  Regular rate and rhythm.  S1 and S2, without murmurs, gallops, rubs.  VASCULAR:  No Edema.  Peripheral pulses normal and equal in all extremities.  ABDOMEN:  Soft, without detectable tenderness.  No sign of distention.  No   rebound or guarding, and no masses palpated.   Bowel Sounds normal.  MUSCULOSKELETAL:  Good range of motion of all major joints. Extremities without clubbing, cyanosis or edema.    NEUROLOGIC EXAM:  Alert and oriented x 3.  No focal sensory or strength deficits.   Speech normal.  Follows commands.  PSYCHIATRIC:  Mood normal.  -11/01/2023: I did not perform pelvic examination.    No results found for this or any previous visit.  No results found for this or any previous visit.    Assessment:  Problem List Items Addressed This Visit          Renal/    History of hysterectomy - Primary       Oncology    Squamous cell carcinoma of vulva    Family history of ovarian cancer    RESOLVED: Family history of prostate cancer in father       Endocrine    History of Graves' disease    Unintentional weight loss       Other    Tobacco abuse    Inguinal lymphadenopathy    Anorexia       Squamous cell carcinoma of vulva:  -presentation:  Left vulvar painful, tender mass for 1 year before seeking care in 09/2023  -vulvar biopsy 09/21/2023: Squamous cell carcinoma, extending to the deep and peripheral margins  -gyn exam 09/15/2023:  Friable, atypical borders, tender, entire left labia replaced by tumor/condylomatous appearance in several areas; left inguinal lymphadenopathy  -Gyn Onc exam 09/29/2023:  Very large vulvar mass involving the mid and posterior left labial structures with areas of ulceration, etc.; no fixation; distal vagina involved; involve the left anterior portion of the anus as well; no extension into deeper tissue; 8 cm x 5 cm;  -MRI pelvis with and without contrast 10/20/2023:  Left vulvar  mass involving the left perineum and lower 2/3 of the vagina; 8.0 x 2.5 x 5.4 cm; no urethral invasion; abutment of the anterior rectal wall is indeterminate for invasion; left inguinal lymphadenopathy (2 abnormal lymph nodes, largest 2.0 cm; a 3rd lymph node similar appearance nonenlarged)  -PET-CT 10/31/2023:  No distant metastases; no intrapelvic lymphadenopathy; 2.4 cm hypermetabolic left inguinal lymph node; additionally, hypermetabolic lymph node adjacent to it as well; primary tumor, hypermetabolic, involving a portion of the vaginal canal)  To summarize:  -squamous cell carcinoma of the vulva (left labia)  -disease involving lower 2/3 of the vagina on MRI  -left inguinal lymphadenopathy (regional lymphadenopathy), 2.0 cm on MRI pelvis, 2.4 cm on PET-CT (> 5 mm), therefore, FIGO stage IIIB  -11/01/2023: CBC normal; hemoglobin 13.7; CMP unremarkable; HIV nonreactive  -11/14/2023: Message from Dr. Harjinder Maynard's office:  Recommendation to proceed with chemoradiation therapy; if persistent disease after treatment, then, will plan surgical resection      Father experienced prostate cancer at age 70   Daughter experienced ovarian cancer at age 31    History of hysterectomy at age 37 for AUB/uterine fibroids  History of Graves disease, treated; subsequently, hypothyroid  History of tobacco abuse         Plan:  Continue Megace  Start chemoradiation therapy ASAP  Chemotherapy orders are in:  Cisplatin weekly   Check CBC, CMP and magnesium weekly  Three months after completion of chemoradiation therapy, FDG PET-CT to assess response (do not perform FDG PET-CT before 3 months after completion of chemoradiation therapy)   Genetic testing because daughter experienced ovarian cancer at age 31  Chemo teaching with nursing staff ASAP  Follow-up with NP within a week for chemo teaching   --------------------------------      -11/01/2023: Anorexia, 20 lb weight loss in last 3 months   -start Megace 400 mg p.o.  q.day    -squamous cell carcinoma of the vulva (left labia)  -disease involving lower 2/3 of the vagina on MRI  -left inguinal lymphadenopathy (regional lymphadenopathy), 2.0 cm on MRI pelvis, 2.4 cm on PET-CT (> 5 mm), therefore, FIGO stage IIIB  -locally advanced disease; unresectable  -radiologically suspicious nodes (left inguinal lymphadenopathy on MRI and every PET-CT)    Primary treatment:   1. Inguinofemoral lymphadenectomy; if positive lymph nodes, then, EBRT +concurrent chemotherapy to primary tumor/inguinofemoral lymph node/pelvic lymph nodes  2. Inguinofemoral lymphadenectomy; if negative lymph node, then, EBRT +concurrent chemotherapy to primary tumor (+/- selective inguinofemoral lymph node coverage)  3. If inguinofemoral lymphadenectomy not performed then:  Consider FNA for enlarged lymph nodes, followed by EBRT +concurrent chemotherapy to primary tumor/inguinofemoral lymph nodes/pelvic lymph nodes    >>>  -11/01/2023: Message from Dr. Harjinder Dumont:  He wishes to perform inguinofemoral lymphadenectomy  -therefore, we will defer chemoradiation therapy until after she heals up from surgery  -11/14/2023: Message from Dr. Harjinder Maynard's office:  Recommendation to proceed with chemoradiation therapy; if persistent disease after treatment, then, will plan surgical resection  >>>  -proceed with EBRT +concurrent chemotherapy to primary tumor/inguinofemoral lymph node/pelvic lymph nodes  -Preferred chemotherapy:  Cisplatin   -cisplatin 40 mg per m2 IV day 1, every 7 days x7 weeks with concurrent radiotherapy  -check CBC, CMP, and magnesium weekly   -monitor for hypersensitivity reactions, neuropathy, ototoxicity, renal toxicity, electrolyte imbalance, etc.  -evaluation of response:  Consider FDG PET-CT at 3-6 months to assess treatment response after definitive primary treatment  -vulvar pain; continue Norco (prescribed by Radiation Oncology)    Followed by evaluation of response to EBRT +concurrent  chemotherapy:   Consider FDG PET-CT at 3-6 months to assess treatment response after definitive primary treatment  1. If clinically negative for residual tumor at primary site and nodes, then: Surveillance  2. If clinically negative for residual tumor at primary site and nodes, then: Consider biopsy of tumor bed to confirm pathologic complete response (no sooner than 3 months after completion of treatment); if biopsy negative, then, surveillance; if biopsy positive, then, resection, followed by surveillance for negative margins, and consideration of additional surgery (consider pelvic exenteration for selected cases with a central recurrence), additional EBRT and/or systemic therapy for positive margins for invasive disease   3. If clinically positive for residual tumor at primary site and/or nodes, then:  -resection, followed by surveillance for negative margins  -resection, followed by additional surgery/additional EBRT, N/or systemic therapy for positive margins for invasive disease  -if unresectable, then, consider additional EBRT and/or systemic therapy or best supportive care      Surveillance (after completion of treatment):  1. Interval history and physical every 3-6 months for 2 years, then every 6-12 months for 3-5 years, then annually based on patient's risk of disease recurrence  2. Cervical/vaginal cytology screening for detection of lower genital tract neoplasia (may include HPV testing)  3. Imaging as indicated based on symptoms or examination findings suspicious for recurrence  4. Laboratory assessment as indicated based on symptoms or examination findings suspicious for recurrence  5. Patient Education regarding symptoms of potential recurrence and vulvar dystrophy, periodic self examinations, lifestyle, obesity, exercise, 6 health (including vaginal dilator use and lubricant/moisturizers), smoking cessation, nutrition counseling, and potential long-term and late effects of treatment     Preferred  chemotherapy for chemoradiation therapy:  -cisplatin  -carboplatin if patient cisplatin intolerant  -other recommended regimens: Cisplatin/5 FU; capecitabine/mitomycin; gemcitabine; paclitaxel    Cisplatin, concurrent with radiotherapy:  Cisplatin 40 mg per m2 IV day 1, 7 day cycle x7 weeks with concurrent radiotherapy    Monitor for hypersensitivity reactions, electrolyte abnormalities (hypomagnesemia, hypokalemia), renal dysfunction, peripheral neuropathy, ototoxicity.    Consider HIV testing    Imagin. Initial workup:  Chest x-ray/chest CT without contrast; pelvic MRI to aid in surgical and/or radiation treatment planning; consider neck/chest/abdomen/pelvis/groin FDG PET-CT or CT C/A/P with contrast for T2 or larger tumors or metastases suspected, etc.    Follow-up/surveillance imaging:  CT C/A/P with contrast or neck/chest/abdomen/pelvis/groin FDG PET-CT if recurrence/metastases suspected  Consider FDG PET-CT at 3-6 months to assess treatment response after definitive primary treatment    Imaging for suspected or documented recurrence:   Consider neck/chest/abdomen/pelvis/groin FDG PET-CT if not previously performed during surveillance  Consider pelvic MRI to aid in further treatment planning    Daughter experienced ovarian cancer age 31  >>>  -will consider genetic testing    History of tobacco abuse  -importance of complete and permanent abstinence discussed and strongly emphasized    History of Graves disease, treated with radioactive iodine in 1990, resulting in iatrogenic hypothyroidism; subsequently, on thyroid replacement therapy   Follow-up with PCP    Chemo teaching with nursing staff ASAP  Follow-up with NP within a week for chemo teaching     Above discussed at length with the patient.  All questions answered.  Discussed labs and scans and gave her copies of relevant records.  Plan of management discussed in detail.  Potential side effects of cisplatin discussed.  Formal chemotherapy  teaching with nursing staff to follow.  She understands and agrees with this plan.    Follow-up:  No follow-ups on file.  Answers submitted by the patient for this visit:  Review of Systems Questionnaire (Submitted on 11/19/2023)  appetite change : No  unexpected weight change: No  mouth sores: No  visual disturbance: No  cough: No  shortness of breath: No  chest pain: No  abdominal pain: No  diarrhea: No  frequency: No  back pain: No  rash: No  headaches: No  adenopathy: No  nervous/ anxious: Yes

## 2023-11-21 NOTE — NURSING
Treatment order for chemoradiotherapy. ROCKY Kay confirmed with Chey at [facility] that patient had consult, sim and treatment plan is ready. Patient is scheduled for chemo teaching on 11/29/23. Notified DANDRE Green and YOVANI Mendez RN that patient is ready to be scheduled for initial chemoradiation.

## 2023-11-21 NOTE — Clinical Note
Continue Megace Start chemoradiation therapy ASAP Chemotherapy orders are in:  Cisplatin weekly  Check CBC, CMP and magnesium weekly Three months after completion of chemoradiation therapy, FDG PET-CT to assess response (do not perform FDG PET-CT before 3 months after completion of chemoradiation therapy)  Genetic testing because daughter experienced ovarian cancer at age 31 Chemo teaching with nursing staff ASAP Follow-up with NP within a week for chemo teaching

## 2023-11-29 ENCOUNTER — OFFICE VISIT (OUTPATIENT)
Dept: HEMATOLOGY/ONCOLOGY | Facility: CLINIC | Age: 58
End: 2023-11-29
Payer: MEDICAID

## 2023-11-29 VITALS
BODY MASS INDEX: 23.7 KG/M2 | OXYGEN SATURATION: 100 % | RESPIRATION RATE: 20 BRPM | DIASTOLIC BLOOD PRESSURE: 75 MMHG | TEMPERATURE: 98 F | HEART RATE: 67 BPM | SYSTOLIC BLOOD PRESSURE: 139 MMHG | HEIGHT: 62 IN | WEIGHT: 128.81 LBS

## 2023-11-29 DIAGNOSIS — Z86.39 HISTORY OF GRAVES' DISEASE: ICD-10-CM

## 2023-11-29 DIAGNOSIS — R10.2 PAIN IN VULVA: ICD-10-CM

## 2023-11-29 DIAGNOSIS — C51.9 SQUAMOUS CELL CARCINOMA OF VULVA: Primary | ICD-10-CM

## 2023-11-29 DIAGNOSIS — R63.0 ANOREXIA: ICD-10-CM

## 2023-11-29 DIAGNOSIS — Z72.0 TOBACCO ABUSE: ICD-10-CM

## 2023-11-29 PROCEDURE — 99215 PR OFFICE/OUTPT VISIT, EST, LEVL V, 40-54 MIN: ICD-10-PCS | Mod: S$PBB,,, | Performed by: NURSE PRACTITIONER

## 2023-11-29 PROCEDURE — 3075F SYST BP GE 130 - 139MM HG: CPT | Mod: CPTII,,, | Performed by: NURSE PRACTITIONER

## 2023-11-29 PROCEDURE — 3078F DIAST BP <80 MM HG: CPT | Mod: CPTII,,, | Performed by: NURSE PRACTITIONER

## 2023-11-29 PROCEDURE — 99215 OFFICE O/P EST HI 40 MIN: CPT | Mod: PBBFAC | Performed by: NURSE PRACTITIONER

## 2023-11-29 PROCEDURE — 1159F MED LIST DOCD IN RCRD: CPT | Mod: CPTII,,, | Performed by: NURSE PRACTITIONER

## 2023-11-29 PROCEDURE — 3078F PR MOST RECENT DIASTOLIC BLOOD PRESSURE < 80 MM HG: ICD-10-PCS | Mod: CPTII,,, | Performed by: NURSE PRACTITIONER

## 2023-11-29 PROCEDURE — 3075F PR MOST RECENT SYSTOLIC BLOOD PRESS GE 130-139MM HG: ICD-10-PCS | Mod: CPTII,,, | Performed by: NURSE PRACTITIONER

## 2023-11-29 PROCEDURE — 4010F PR ACE/ARB THEARPY RXD/TAKEN: ICD-10-PCS | Mod: CPTII,,, | Performed by: NURSE PRACTITIONER

## 2023-11-29 PROCEDURE — 1159F PR MEDICATION LIST DOCUMENTED IN MEDICAL RECORD: ICD-10-PCS | Mod: CPTII,,, | Performed by: NURSE PRACTITIONER

## 2023-11-29 PROCEDURE — 3008F BODY MASS INDEX DOCD: CPT | Mod: CPTII,,, | Performed by: NURSE PRACTITIONER

## 2023-11-29 PROCEDURE — 3008F PR BODY MASS INDEX (BMI) DOCUMENTED: ICD-10-PCS | Mod: CPTII,,, | Performed by: NURSE PRACTITIONER

## 2023-11-29 PROCEDURE — 4010F ACE/ARB THERAPY RXD/TAKEN: CPT | Mod: CPTII,,, | Performed by: NURSE PRACTITIONER

## 2023-11-29 PROCEDURE — 99215 OFFICE O/P EST HI 40 MIN: CPT | Mod: S$PBB,,, | Performed by: NURSE PRACTITIONER

## 2023-11-29 RX ORDER — ONDANSETRON 8 MG/1
8 TABLET, ORALLY DISINTEGRATING ORAL EVERY 6 HOURS PRN
Qty: 30 TABLET | Refills: 3 | Status: SHIPPED | OUTPATIENT
Start: 2023-11-29

## 2023-11-29 NOTE — PROGRESS NOTES
Reason for Follow-up:  Chemotherapy teaching Cisplatin for Squamous Cell Carcinoma of Vulva    Current Treatment:  -cisplatin 40 mg per m2 IV day 1, every 7 days x7 weeks with concurrent radiotherapy  start 11/30/23    Treatment History:  Past medical history:  Rasheeda-Parkinson-White syndrome.  Graves disease, treated with radioactive iodine in 1999 or 2000, resulting in iatrogenic hypothyroidism; subsequently, on thyroid replacement therapy.  Hypertension.  Anxiety.  Tobacco abuse.  History of abnormal Pap smears in the past; no regular follow-up.  According to her, history of abnormal Pap smears, HPV 16 positive    Surgical/procedure history:  Hysterectomy for AUB/fibroids at age 37.     Social history:  Single.  Has 3 children.  Lives in [place], [place].  Used to work as a ; does not work anymore.  Has been smoking < half a pack of cigarettes daily for 10-15 years.  Used to drink 3 beers 3 X a week; now, down to or 3 beers once a week.  No illicit drugs.    Family history:  Daughter experienced ovarian cancer at age 31.    Health maintenance:  PCP in [place], [place].  -04/25/2023: Bilateral screening mammogram (comparison:  None):  BI-RADS 1-negative  -no screening colonoscopy ever.    Gyn history:  Pregnancy x3.  Vaginal birth x3.  Hysterectomy for AUB/fibroids at age 37.        History of Present Illness:     Oncologic/Hematologic History:  Oncology History   Squamous cell carcinoma of vulva   10/31/2023 Initial Diagnosis    Squamous cell carcinoma of vulva     10/31/2023 Cancer Staged    Staging form: Vulva, AJCC 8th Edition  - Clinical stage from 10/31/2023: FIGO Stage IIIB, calculated as Stage LINH (cT3, cN2b, cM0)     11/14/2023 -  Chemotherapy    Treatment Summary   Plan Name: OP GYN CISPLATIN WEEKLY  Treatment Goal: Curative  Status: Active  Start Date: 11/14/2023 (Planned)  End Date: 12/26/2023 (Planned)  Provider: Wai Verduzco MD  Chemotherapy: CISplatin (Platinol) in sodium  chloride 0.9% 313 mL chemo infusion, 40 mg/m2 = 63 mg, Intravenous, Clinic/HOD 1 time, 0 of 7 cycles     58-year-old lady, referred by Dr. Harjinder Dumont, gyn onc, [facility], [place], [place], with squamous cell carcinoma of vulva.      09/15/2023:  Gyn office note (Jacinda Berger MD, gyn):  Left vulvar mass for 1 year; growth over last 6 months; pain; rubs on clothes  History of hysterectomy in the past  Noticed mass 1 year ago; was caring for her mother who was bedridden; now, ; noticed the mass growing; pain in the area; no problems with bowels or urination  History of hysterectomy for AUB/fibroids.  History of abnormal Pap smears for years.  Smoker.  Not sexually active in sometime.  Last gyn/ exam was shortly after hysterectomy at age 37.   Exam:  Friable, atypical borders, tender, entire left labia replaced by tumor/condylomatous appearance in several areas; right labia no lesion; left labia tenderness, lesions, and skin changes; inguinal lymphadenopathy present on the left side; no inguinal lymphadenopathy on the right side; unable to placed speculum in vagina due to tenderness at enteritis      2023:  Dr. Harjinder Dumont, gyn onc office note (referred by Dr. Jacinda Berger, gyn):  Vaginal pain, 4/10  Left vulvar tender mass.  She has been providing care for her mother for the last year, and therefore, allowed the mass to grow without evaluation because of her focus on caregiving.  Gyn assessment 09/15/2023 showed a large mass suspicious for cancer.  Biopsy was done on 2023, revealing squamous cell carcinoma.  Other than the direct vulvar discomfort related to the mass, she denied significant bleeding, discharge, altered bladder or bowel function, leg swelling, weight loss, or anorexia.  The mass was quite tender and painful with sitting and activities, and very tender to touch.  No history of vulvar lesions or intraepithelial neoplasia  Physical exam (  Harjinder Dumont):  Very large vulvar mass consistent with squamous cell carcinoma involving most of the mid and posterior left labial structures with areas of ulceration; is zone of induration around the mass that extends into the subcutaneous tissues; no fixation to the underlying fascia or bone; the mass extends around the perineal body to involve the right side of the perineal region; the distal vagina also appears involved by the tumor; the mass is difficult to assess because with the patient's tenderness; the mass also extends posteriorly to involve the left anterior portion of the anus; no extension into the deeper tissues; overall size 8 cm x 5 cm, and thickness difficult to clarify; vaginal exam was not feasible because of the size of tumor and the performed tenderness at the patient was experiencing  Assessment and plan (Dr. Harjinder Maynard):  Large squamous cell carcinoma of the vulva; T2 with involvement of the distal vagina and exterior anus; quite large; not usually considered best treated with surgery as the initial approach; standard therapy would be chemoradiation therapy; if there is any residual tumor remaining after initial therapy, then, surgical resection would be considered.  Pelvic and vulvar MRI and PET-CT scan recommended.      Investigations reviewed:    09/21/2023:  Vulva, biopsy ([facility]):  -squamous cell carcinoma, extending to the deep and peripheral margins    10/20/2023: MRI pelvis with and without contrast (staging)   1. Biopsy-proven malignant left vulvar mass involves the the left perineum and lower two thirds of the vagina. Abutment of the anterior rectal wall is indeterminate for invasion.   2. Left inguinal adenopathy, compatible with metastatic disease  (enhancing soft tissue mass involving the left perineum, left vulva, and the lower 2/3 of vagina, measuring 8.0 x 2.5 by 5.4 cm; loss of normal fat plane between mass in the anterior rectum, indeterminate  for invasion; no evidence of urethral invasion; 2 adjacent morphologically abnormal rounded and enlarged left inguinal lymph nodes present, largest 2.0 cm; a 3rd lymph node similar appearing the nonenlarged left inguinal lymph node)    10/31/2023: FDG PET-CT for stagin. Hypermetabolic activity at the bulb on volume portion of the vaginal canal although some of this activity may reflect urinary contamination. This is the site of the primary lesion.   2. Hypermetabolic adenopathy within the left inguinal node chain.   (No intrapelvic lymphadenopathy is seen. There is a large hypermetabolic lymph node within the left inguinal chain measuring 2.4 cm and SUV max of 3.4. A smaller hypermetabolic lymph node is seen superficially adjacent to it measuring SUV max 2.3. Hypermetabolic activity at the vulva involving a portion of the vaginal canal is identified, SUV max measuring 11.8. Some of this activity may reflect urinary contamination. The SUV max of the urinary bladder is 10.2.)    2023:   Pleasant lady who presents for initial medical oncology consultation, accompanied by her father.  In no acute discomfort.  Intermittent pain in the left vulvar mass.  On Norco 7.5 mg p.o. PRN; she takes half a pill 3 X daily.  It was prescribed by Dr. Familia Red, radiation oncologist.  No ulceration over the mass.  Occasional spotting.  No vaginal bleeding.    Also has experienced itching over the vulvar mass.  Anorexia and 20 lb weight loss over last 3 months.  No unusual headaches, focal neurological symptoms, chest pain, cough, dyspnea, any other lumps or lymphadenopathy, abdominal pain, nausea, vomiting, any urinary or bowel symptoms, etc..  ECOG 0.  Presents for a follow-up visit, accompanied by a male family member, probably .  No bleeding or discharge from vulvar lesion.  Variable degree of pain related to overall cancer, wearing in severity from 06/10 to 8/10.  Takes Norco, prescribed by Radiation  Oncology.  Appetite is okay.  No chest pain, cough, dyspnea, abdominal pain, nausea, vomiting, anorexia, any bowel complaints, etc..  ECOG 0-1.  Anxiously awaiting start of chemoradiation therapy.    Interval History 11/29/2023: Patient presents for chemotherapy teaching on Cisplatin concurrent with radiation. We discussed treatment regimen along with potential side effects. We also discussed treatment options for any presenting symptoms. Patient consents to start treatment on 11/30/23 via Picc line    Review of Systems:   All systems reviewed and found to be negative except for the symptoms detailed above    Physical Examination:   VITAL SIGNS:   Vitals:    11/29/23 1304   BP: 139/75   Pulse: 67   Resp: 20   Temp: 97.9 °F (36.6 °C)       Assessment:  Squamous cell carcinoma of vulva:  -presentation:  Left vulvar painful, tender mass for 1 year before seeking care in 09/2023  -vulvar biopsy 09/21/2023: Squamous cell carcinoma, extending to the deep and peripheral margins  -gyn exam 09/15/2023:  Friable, atypical borders, tender, entire left labia replaced by tumor/condylomatous appearance in several areas; left inguinal lymphadenopathy  -Gyn Onc exam 09/29/2023:  Very large vulvar mass involving the mid and posterior left labial structures with areas of ulceration, etc.; no fixation; distal vagina involved; involve the left anterior portion of the anus as well; no extension into deeper tissue; 8 cm x 5 cm;  -MRI pelvis with and without contrast 10/20/2023:  Left vulvar mass involving the left perineum and lower 2/3 of the vagina; 8.0 x 2.5 x 5.4 cm; no urethral invasion; abutment of the anterior rectal wall is indeterminate for invasion; left inguinal lymphadenopathy (2 abnormal lymph nodes, largest 2.0 cm; a 3rd lymph node similar appearance nonenlarged)  -PET-CT 10/31/2023:  No distant metastases; no intrapelvic lymphadenopathy; 2.4 cm hypermetabolic left inguinal lymph node; additionally, hypermetabolic lymph  node adjacent to it as well; primary tumor, hypermetabolic, involving a portion of the vaginal canal)  To summarize:  -squamous cell carcinoma of the vulva (left labia)  -disease involving lower 2/3 of the vagina on MRI  -left inguinal lymphadenopathy (regional lymphadenopathy), 2.0 cm on MRI pelvis, 2.4 cm on PET-CT (> 5 mm), therefore, FIGO stage IIIB  -11/01/2023: CBC normal; hemoglobin 13.7; CMP unremarkable; HIV nonreactive  -11/14/2023: Message from Dr. Harjinder Maynard's office:  Recommendation to proceed with chemoradiation therapy; if persistent disease after treatment, then, will plan surgical resection      Father experienced prostate cancer at age 70   Daughter experienced ovarian cancer at age 31    History of hysterectomy at age 37 for AUB/uterine fibroids  History of Graves disease, treated; subsequently, hypothyroid  History of tobacco abuse         Plan:  Squamous cell carcinoma of vulva  -squamous cell carcinoma of the vulva (left labia)  -disease involving lower 2/3 of the vagina on MRI  -left inguinal lymphadenopathy (regional lymphadenopathy), 2.0 cm on MRI pelvis, 2.4 cm on PET-CT (> 5 mm), therefore, FIGO stage IIIB  -locally advanced disease; unresectable  -radiologically suspicious nodes (left inguinal lymphadenopathy on MRI and every PET-CT)    Primary treatment:   1. Inguinofemoral lymphadenectomy; if positive lymph nodes, then, EBRT +concurrent chemotherapy to primary tumor/inguinofemoral lymph node/pelvic lymph nodes  2. Inguinofemoral lymphadenectomy; if negative lymph node, then, EBRT +concurrent chemotherapy to primary tumor (+/- selective inguinofemoral lymph node coverage)  3. If inguinofemoral lymphadenectomy not performed then:  Consider FNA for enlarged lymph nodes, followed by EBRT +concurrent chemotherapy to primary tumor/inguinofemoral lymph nodes/pelvic lymph nodes    >>>  -11/01/2023: Message from Dr. Harjinder Dumont:  He wishes to perform inguinofemoral  lymphadenectomy  -therefore, we will defer chemoradiation therapy until after she heals up from surgery  -11/14/2023: Message from Dr. Harjinder Maynard's office:  Recommendation to proceed with chemoradiation therapy; if persistent disease after treatment, then, will plan surgical resection  >>>    Followed by evaluation of response to EBRT +concurrent chemotherapy:   Consider FDG PET-CT at 3-6 months to assess treatment response after definitive primary treatment  1. If clinically negative for residual tumor at primary site and nodes, then: Surveillance  2. If clinically negative for residual tumor at primary site and nodes, then: Consider biopsy of tumor bed to confirm pathologic complete response (no sooner than 3 months after completion of treatment); if biopsy negative, then, surveillance; if biopsy positive, then, resection, followed by surveillance for negative margins, and consideration of additional surgery (consider pelvic exenteration for selected cases with a central recurrence), additional EBRT and/or systemic therapy for positive margins for invasive disease   3. If clinically positive for residual tumor at primary site and/or nodes, then:  -resection, followed by surveillance for negative margins  -resection, followed by additional surgery/additional EBRT, N/or systemic therapy for positive margins for invasive disease  -if unresectable, then, consider additional EBRT and/or systemic therapy or best supportive care      Surveillance (after completion of treatment):  1. Interval history and physical every 3-6 months for 2 years, then every 6-12 months for 3-5 years, then annually based on patient's risk of disease recurrence  2. Cervical/vaginal cytology screening for detection of lower genital tract neoplasia (may include HPV testing)  3. Imaging as indicated based on symptoms or examination findings suspicious for recurrence  4. Laboratory assessment as indicated based on symptoms or examination findings  suspicious for recurrence  5. Patient Education regarding symptoms of potential recurrence and vulvar dystrophy, periodic self examinations, lifestyle, obesity, exercise, 6 health (including vaginal dilator use and lubricant/moisturizers), smoking cessation, nutrition counseling, and potential long-term and late effects of treatment     Preferred chemotherapy for chemoradiation therapy:  -cisplatin  -carboplatin if patient cisplatin intolerant  -other recommended regimens: Cisplatin/5 FU; capecitabine/mitomycin; gemcitabine; paclitaxel    -proceed with EBRT +concurrent chemotherapy to primary tumor/inguinofemoral lymph node/pelvic lymph nodes  -Preferred chemotherapy:  Cisplatin   -cisplatin 40 mg per m2 IV day 1, every 7 days x7 weeks with concurrent radiotherapy    Return to clinic 23 to start cycle 1 of Cisplatin concurrent with radiation via picc line  follow up with np in 1 week with lab work (cbc,cmp,mg) for toxicity check prior to Cisplatin cycle 2   Check CBC, CMP and magnesium weekly  Three months after completion of chemoradiation therapy, FDG PET-CT to assess response (do not perform FDG PET-CT before 3 months after completion of chemoradiation therapy)   Genetic testing because daughter experienced ovarian cancer at age 31-Scheduled 2024    --------------------------------    Anorexia  -2023: 20 lb weight loss in last 3 months   Continue Megace 400 mg p.o. q.day    Monitoring:  -monitor for hypersensitivity reactions, neuropathy, ototoxicity, renal toxicity, electrolyte imbalance, etc.  Monitor for hypersensitivity reactions, electrolyte abnormalities (hypomagnesemia, hypokalemia), renal dysfunction, peripheral neuropathy, ototoxicity.    vulvar pain  Continue Norco (prescribed by Radiation Oncology)      Consider HIV testing    Imagin. Initial workup:  Chest x-ray/chest CT without contrast; pelvic MRI to aid in surgical and/or radiation treatment planning; consider  neck/chest/abdomen/pelvis/groin FDG PET-CT or CT C/A/P with contrast for T2 or larger tumors or metastases suspected, etc.    Follow-up/surveillance imaging:  CT C/A/P with contrast or neck/chest/abdomen/pelvis/groin FDG PET-CT if recurrence/metastases suspected  Consider FDG PET-CT at 3-6 months to assess treatment response after definitive primary treatment    Imaging for suspected or documented recurrence:   Consider neck/chest/abdomen/pelvis/groin FDG PET-CT if not previously performed during surveillance  Consider pelvic MRI to aid in further treatment planning    History of tobacco abuse  -importance of complete and permanent abstinence discussed and strongly emphasized    History of Graves disease  -treated with radioactive iodine in 1990 9-2000, resulting in iatrogenic hypothyroidism; subsequently, on thyroid replacement therapy   Follow-up with PCP    Above discussed at length with the patient.  All questions answered.  Plan of management discussed in detail.  Potential side effects of cisplatin discussed.  She understands and agrees with this plan.    Answers submitted by the patient for this visit:  Review of Systems Questionnaire (Submitted on 11/28/2023)  appetite change : No  mouth sores: No  cough: No  shortness of breath: No  chest pain: No  diarrhea: No  rash: No  headaches: No  adenopathy: No

## 2023-11-30 ENCOUNTER — HOSPITAL ENCOUNTER (OUTPATIENT)
Dept: RADIOLOGY | Facility: HOSPITAL | Age: 58
Discharge: HOME | End: 2023-11-30
Attending: INTERNAL MEDICINE
Payer: MEDICAID

## 2023-11-30 ENCOUNTER — CLINICAL SUPPORT (OUTPATIENT)
Dept: HEMATOLOGY/ONCOLOGY | Facility: CLINIC | Age: 58
End: 2023-11-30
Payer: MEDICAID

## 2023-11-30 ENCOUNTER — DOCUMENTATION ONLY (OUTPATIENT)
Dept: HEMATOLOGY/ONCOLOGY | Facility: CLINIC | Age: 58
End: 2023-11-30
Payer: MEDICAID

## 2023-11-30 ENCOUNTER — INFUSION (OUTPATIENT)
Dept: INFUSION THERAPY | Facility: HOSPITAL | Age: 58
End: 2023-11-30
Attending: INTERNAL MEDICINE
Payer: MEDICAID

## 2023-11-30 VITALS
SYSTOLIC BLOOD PRESSURE: 148 MMHG | WEIGHT: 131.81 LBS | OXYGEN SATURATION: 100 % | HEIGHT: 62 IN | DIASTOLIC BLOOD PRESSURE: 77 MMHG | HEART RATE: 65 BPM | RESPIRATION RATE: 20 BRPM | TEMPERATURE: 98 F | BODY MASS INDEX: 24.26 KG/M2

## 2023-11-30 DIAGNOSIS — C51.9 SQUAMOUS CELL CARCINOMA OF VULVA: Primary | ICD-10-CM

## 2023-11-30 PROCEDURE — 96413 CHEMO IV INFUSION 1 HR: CPT

## 2023-11-30 PROCEDURE — 25000003 PHARM REV CODE 250: Performed by: INTERNAL MEDICINE

## 2023-11-30 PROCEDURE — 27201423 HC KIT VASCULAR ACCESS

## 2023-11-30 PROCEDURE — 36573 INSJ PICC RS&I 5 YR+: CPT

## 2023-11-30 PROCEDURE — 63600175 PHARM REV CODE 636 W HCPCS: Performed by: INTERNAL MEDICINE

## 2023-11-30 PROCEDURE — 96375 TX/PRO/DX INJ NEW DRUG ADDON: CPT | Mod: 59

## 2023-11-30 PROCEDURE — 96367 TX/PROPH/DG ADDL SEQ IV INF: CPT | Mod: 59

## 2023-11-30 RX ORDER — SODIUM CHLORIDE 0.9 % (FLUSH) 0.9 %
10 SYRINGE (ML) INJECTION
Status: DISCONTINUED | OUTPATIENT
Start: 2023-11-30 | End: 2023-11-30 | Stop reason: HOSPADM

## 2023-11-30 RX ORDER — HEPARIN 100 UNIT/ML
500 SYRINGE INTRAVENOUS
Status: DISCONTINUED | OUTPATIENT
Start: 2023-11-30 | End: 2023-11-30 | Stop reason: HOSPADM

## 2023-11-30 RX ADMIN — CISPLATIN 64 MG: 1 INJECTION, SOLUTION INTRAVENOUS at 09:11

## 2023-11-30 RX ADMIN — DEXAMETHASONE SODIUM PHOSPHATE 0.25 MG: 4 INJECTION, SOLUTION INTRA-ARTICULAR; INTRALESIONAL; INTRAMUSCULAR; INTRAVENOUS; SOFT TISSUE at 08:11

## 2023-11-30 RX ADMIN — POTASSIUM CHLORIDE 500 ML/HR: 2 INJECTION, SOLUTION, CONCENTRATE INTRAVENOUS at 10:11

## 2023-11-30 RX ADMIN — APREPITANT 130 MG: 130 INJECTION, EMULSION INTRAVENOUS at 08:11

## 2023-11-30 RX ADMIN — SODIUM CHLORIDE 500 ML: 9 INJECTION, SOLUTION INTRAVENOUS at 08:11

## 2023-11-30 NOTE — NURSING
Pt received C1 Cisplatin without incident other than feeling tired; pt & father to go to xrt at [facility] from here; called [facility] at [phone] to notify them pt completed chemo infusion.

## 2023-11-30 NOTE — NURSING
Cycle 1 Cisplatin (weekly concurrent with xrt     Pt presented with father for PICC placement & Cycle 1 Cisplatin; PICC consent obtained; teaching presented on PICC insertion & care and chemo teaching; pt & father verbalized understanding.

## 2023-11-30 NOTE — NURSING
ROCKY Kay met with patient for resource education at initial treatment. Reviewed RN Rahul contact information and role in patient's care. Patient accompanied by her father. Patient reported her level of distress at 3. Says she took a xanax this morning. Patient indicates her distress is related to having a PICC line and starting chemo. Reports she has resources of Medicaid benefits. Social support reported as good. Provided information on community and in-house counseling services. Resource folder with written information of community and cancer resources, disability benefits, nutritional hints during cancer treatment, and chemo side effect guide given to patient. Spent additional time on signs of infection, infection prevention through good hand hygiene and wearing mask, adequate nutrition/hydration, skin care along with sunscreen, and oral care. Reviewed contact information for clinic and information related to [facility] communication. Discussed communication process for some common scenarios in which patient should call provider for guidance vs. immediately report to the emergency room. Reports she has already utilized  [facility] and agrees to be referred to [employer] to utilize during the course of her treatment. Patient verbalized understanding and agrees to contact ROCKY Kay if any needs or concerns arise.     Oncology Navigation   Intake  Cancer Type: Gynecologic  Initial Nurse Navigator Contact: 10/27/23  Date Worked: 11/30/23  Appointment Date: 11/01/23  Start of Treatment: 11/30/23     Treatment  Current Status: Active    Surgical Oncologist: Dr. Harjinder Dumont  Type of Surgery: inguinofemoral lymphadenectomy    Medical Oncologist: Wai Verduzco MD  Consult Date: 11/01/23  Chemotherapy: Initiated  Chemotherapy Regimen: cisplatin 40 mg per m2 IV day 1, every 7 days x7 weeks with concurrent radiotherapy          General Referrals: [employer]          Support Systems:  "Spouse/significant other; Parent; Children; Family members; Friends / neighbors  Barriers of Care: Barriers to Care "Assessment completed-no barriers noted"     Acuity  Stage: 2  Systemic Treatment - predicted or initiated: More than one treatment modality concurrently (chemotherapy, radiation, etc.) (+2)  Surgical Procedure Complexity: 1  Treatment Tolerability: Has not started treatment yet/treatment fully completed and side effects resolved  ECO  Comorbidities in Medical History: 2   Needed: 0  Support: 0  Verbalizes Financial Concerns: 0  Transportation: 0  Mental Health: PHQ Score: 0  History of noncompliance/frequent no shows and cancellations: 0  Verbalizes the need for more education: 0  Navigation Acuity: 7     Follow Up  No follow-ups on file.            "

## 2023-12-04 ENCOUNTER — DOCUMENTATION ONLY (OUTPATIENT)
Dept: HEMATOLOGY/ONCOLOGY | Facility: CLINIC | Age: 58
End: 2023-12-04
Payer: MEDICAID

## 2023-12-07 ENCOUNTER — OFFICE VISIT (OUTPATIENT)
Dept: HEMATOLOGY/ONCOLOGY | Facility: CLINIC | Age: 58
End: 2023-12-07
Payer: MEDICAID

## 2023-12-07 ENCOUNTER — INFUSION (OUTPATIENT)
Dept: INFUSION THERAPY | Facility: HOSPITAL | Age: 58
End: 2023-12-07
Attending: INTERNAL MEDICINE
Payer: MEDICAID

## 2023-12-07 VITALS
DIASTOLIC BLOOD PRESSURE: 80 MMHG | WEIGHT: 128.38 LBS | HEART RATE: 70 BPM | BODY MASS INDEX: 24.24 KG/M2 | HEIGHT: 61 IN | OXYGEN SATURATION: 100 % | RESPIRATION RATE: 20 BRPM | SYSTOLIC BLOOD PRESSURE: 120 MMHG | TEMPERATURE: 98 F

## 2023-12-07 VITALS
HEART RATE: 60 BPM | RESPIRATION RATE: 20 BRPM | BODY MASS INDEX: 24.22 KG/M2 | DIASTOLIC BLOOD PRESSURE: 64 MMHG | OXYGEN SATURATION: 100 % | SYSTOLIC BLOOD PRESSURE: 114 MMHG | HEIGHT: 61 IN | WEIGHT: 128.31 LBS | TEMPERATURE: 98 F

## 2023-12-07 DIAGNOSIS — D70.1 CHEMOTHERAPY-INDUCED NEUTROPENIA: ICD-10-CM

## 2023-12-07 DIAGNOSIS — R10.2 PAIN IN VULVA: ICD-10-CM

## 2023-12-07 DIAGNOSIS — C51.9 SQUAMOUS CELL CARCINOMA OF VULVA: Primary | ICD-10-CM

## 2023-12-07 DIAGNOSIS — R94.4 DECREASED CREATININE CLEARANCE: ICD-10-CM

## 2023-12-07 DIAGNOSIS — K59.03 DRUG-INDUCED CONSTIPATION: ICD-10-CM

## 2023-12-07 DIAGNOSIS — T45.1X5A CHEMOTHERAPY-INDUCED NEUTROPENIA: ICD-10-CM

## 2023-12-07 DIAGNOSIS — C51.9 SQUAMOUS CELL CARCINOMA OF VULVA: ICD-10-CM

## 2023-12-07 DIAGNOSIS — R94.4 DECREASED CREATININE CLEARANCE: Primary | ICD-10-CM

## 2023-12-07 PROBLEM — D70.9 NEUTROPENIA: Status: ACTIVE | Noted: 2023-12-07

## 2023-12-07 PROCEDURE — 3008F PR BODY MASS INDEX (BMI) DOCUMENTED: ICD-10-PCS | Mod: CPTII,,, | Performed by: NURSE PRACTITIONER

## 2023-12-07 PROCEDURE — 96413 CHEMO IV INFUSION 1 HR: CPT

## 2023-12-07 PROCEDURE — 96367 TX/PROPH/DG ADDL SEQ IV INF: CPT

## 2023-12-07 PROCEDURE — 99215 PR OFFICE/OUTPT VISIT, EST, LEVL V, 40-54 MIN: ICD-10-PCS | Mod: S$PBB,,, | Performed by: NURSE PRACTITIONER

## 2023-12-07 PROCEDURE — 99215 OFFICE O/P EST HI 40 MIN: CPT | Mod: PBBFAC,25 | Performed by: NURSE PRACTITIONER

## 2023-12-07 PROCEDURE — 1159F MED LIST DOCD IN RCRD: CPT | Mod: CPTII,,, | Performed by: NURSE PRACTITIONER

## 2023-12-07 PROCEDURE — 1159F PR MEDICATION LIST DOCUMENTED IN MEDICAL RECORD: ICD-10-PCS | Mod: CPTII,,, | Performed by: NURSE PRACTITIONER

## 2023-12-07 PROCEDURE — 3008F BODY MASS INDEX DOCD: CPT | Mod: CPTII,,, | Performed by: NURSE PRACTITIONER

## 2023-12-07 PROCEDURE — 63600175 PHARM REV CODE 636 W HCPCS: Performed by: INTERNAL MEDICINE

## 2023-12-07 PROCEDURE — 1160F PR REVIEW ALL MEDS BY PRESCRIBER/CLIN PHARMACIST DOCUMENTED: ICD-10-PCS | Mod: CPTII,,, | Performed by: NURSE PRACTITIONER

## 2023-12-07 PROCEDURE — 4010F PR ACE/ARB THEARPY RXD/TAKEN: ICD-10-PCS | Mod: CPTII,,, | Performed by: NURSE PRACTITIONER

## 2023-12-07 PROCEDURE — 99215 OFFICE O/P EST HI 40 MIN: CPT | Mod: S$PBB,,, | Performed by: NURSE PRACTITIONER

## 2023-12-07 PROCEDURE — 1160F RVW MEDS BY RX/DR IN RCRD: CPT | Mod: CPTII,,, | Performed by: NURSE PRACTITIONER

## 2023-12-07 PROCEDURE — 3074F PR MOST RECENT SYSTOLIC BLOOD PRESSURE < 130 MM HG: ICD-10-PCS | Mod: CPTII,,, | Performed by: NURSE PRACTITIONER

## 2023-12-07 PROCEDURE — 3074F SYST BP LT 130 MM HG: CPT | Mod: CPTII,,, | Performed by: NURSE PRACTITIONER

## 2023-12-07 PROCEDURE — 96361 HYDRATE IV INFUSION ADD-ON: CPT

## 2023-12-07 PROCEDURE — 25000003 PHARM REV CODE 250: Performed by: INTERNAL MEDICINE

## 2023-12-07 PROCEDURE — 96375 TX/PRO/DX INJ NEW DRUG ADDON: CPT

## 2023-12-07 PROCEDURE — 3079F PR MOST RECENT DIASTOLIC BLOOD PRESSURE 80-89 MM HG: ICD-10-PCS | Mod: CPTII,,, | Performed by: NURSE PRACTITIONER

## 2023-12-07 PROCEDURE — A4216 STERILE WATER/SALINE, 10 ML: HCPCS | Performed by: INTERNAL MEDICINE

## 2023-12-07 PROCEDURE — 4010F ACE/ARB THERAPY RXD/TAKEN: CPT | Mod: CPTII,,, | Performed by: NURSE PRACTITIONER

## 2023-12-07 PROCEDURE — 25000003 PHARM REV CODE 250: Performed by: NURSE PRACTITIONER

## 2023-12-07 PROCEDURE — 3079F DIAST BP 80-89 MM HG: CPT | Mod: CPTII,,, | Performed by: NURSE PRACTITIONER

## 2023-12-07 PROCEDURE — 96368 THER/DIAG CONCURRENT INF: CPT

## 2023-12-07 RX ORDER — HEPARIN 100 UNIT/ML
500 SYRINGE INTRAVENOUS
Status: DISCONTINUED | OUTPATIENT
Start: 2023-12-07 | End: 2023-12-07 | Stop reason: HOSPADM

## 2023-12-07 RX ORDER — SODIUM CHLORIDE 0.9 % (FLUSH) 0.9 %
10 SYRINGE (ML) INJECTION
Status: DISCONTINUED | OUTPATIENT
Start: 2023-12-07 | End: 2023-12-07 | Stop reason: HOSPADM

## 2023-12-07 RX ORDER — LACTULOSE 10 G/15ML
20 SOLUTION ORAL 2 TIMES DAILY
Qty: 240 ML | Refills: 1 | Status: SHIPPED | OUTPATIENT
Start: 2023-12-07 | End: 2024-03-07

## 2023-12-07 RX ORDER — OXYCODONE AND ACETAMINOPHEN 10; 325 MG/1; MG/1
1 TABLET ORAL EVERY 4 HOURS PRN
Qty: 90 TABLET | Refills: 0 | Status: SHIPPED | OUTPATIENT
Start: 2023-12-07

## 2023-12-07 RX ADMIN — POTASSIUM CHLORIDE 500 ML/HR: 2 INJECTION, SOLUTION, CONCENTRATE INTRAVENOUS at 09:12

## 2023-12-07 RX ADMIN — Medication 10 ML: at 01:12

## 2023-12-07 RX ADMIN — DEXAMETHASONE SODIUM PHOSPHATE 0.25 MG: 4 INJECTION, SOLUTION INTRA-ARTICULAR; INTRALESIONAL; INTRAMUSCULAR; INTRAVENOUS; SOFT TISSUE at 10:12

## 2023-12-07 RX ADMIN — APREPITANT 130 MG: 130 INJECTION, EMULSION INTRAVENOUS at 10:12

## 2023-12-07 RX ADMIN — SODIUM CHLORIDE 500 ML: 9 INJECTION, SOLUTION INTRAVENOUS at 12:12

## 2023-12-07 RX ADMIN — CISPLATIN 60 MG: 1 INJECTION, SOLUTION INTRAVENOUS at 11:12

## 2023-12-07 RX ADMIN — SODIUM CHLORIDE 500 ML: 9 INJECTION, SOLUTION INTRAVENOUS at 11:12

## 2023-12-07 NOTE — PROGRESS NOTES
Reason for Follow-up:  Chemotherapy teaching Cisplatin for Squamous Cell Carcinoma of Vulva    Current Treatment:  -cisplatin 40 mg per m2 IV day 1, every 7 days x7 weeks with concurrent radiotherapy  start 11/30/23    Treatment History:  Past medical history:  Rasheeda-Parkinson-White syndrome.  Graves disease, treated with radioactive iodine in 1999 or 2000, resulting in iatrogenic hypothyroidism; subsequently, on thyroid replacement therapy.  Hypertension.  Anxiety.  Tobacco abuse.  History of abnormal Pap smears in the past; no regular follow-up.  According to her, history of abnormal Pap smears, HPV 16 positive    Surgical/procedure history:  Hysterectomy for AUB/fibroids at age 37.     Social history:  Single.  Has 3 children.  Lives in [place], [place].  Used to work as a ; does not work anymore.  Has been smoking < half a pack of cigarettes daily for 10-15 years.  Used to drink 3 beers 3 X a week; now, down to or 3 beers once a week.  No illicit drugs.    Family history:  Daughter experienced ovarian cancer at age 31.    Health maintenance:  PCP in [place], [place].  -04/25/2023: Bilateral screening mammogram (comparison:  None):  BI-RADS 1-negative  -no screening colonoscopy ever.    Gyn history:  Pregnancy x3.  Vaginal birth x3.  Hysterectomy for AUB/fibroids at age 37.        History of Present Illness:     Oncologic/Hematologic History:  Oncology History   Squamous cell carcinoma of vulva   10/31/2023 Initial Diagnosis    Squamous cell carcinoma of vulva     10/31/2023 Cancer Staged    Staging form: Vulva, AJCC 8th Edition  - Clinical stage from 10/31/2023: FIGO Stage IIIB, calculated as Stage LINH (cT3, cN2b, cM0)     11/30/2023 -  Chemotherapy    Treatment Summary   Plan Name: OP GYN CISPLATIN WEEKLY  Treatment Goal: Curative  Status: Active  Start Date: 11/30/2023  End Date: 1/11/2024 (Planned)  Provider: Wai Verduzco MD  Chemotherapy: CISplatin (Platinol) 40 mg/m2 = 64 mg in sodium  chloride 0.9% 629 mL chemo infusion, 40 mg/m2 = 64 mg, Intravenous, Clinic/HOD 1 time, 1 of 7 cycles     58-year-old lady, referred by Dr. Harjinder Dumont, gyn onc, [facility], [place], [place], with squamous cell carcinoma of vulva.    Interval History 11/29/2023: Patient presents alone for follow up and toxicitiy check on Cisplatin of which she started last week. Patient reports tolerating well without any significant reporable symptoms. She reports that she is still suffering with constipation. Patient says she has taken several OTC medications and none of them have been effective in managing her constipation. She also reports some nausea mostly in the morning however Zofran works well in managing nausea symptoms. Lab work reviewed with patient, mason. CrCl has significantly dropped since last weeks lab draw. Patient says she has not been consuming very much water. We discussed hydration and its importance. She is due for week 2 of Cisplatin today. Discussed plan of care and follow up.    Review of Systems:   Review of Systems   Gastrointestinal:  Positive for constipation and nausea.       Physical Examination:   VITAL SIGNS:   Vitals:    12/07/23 0821   BP: 120/80   Pulse: 70   Resp: 20   Temp: 97.7 °F (36.5 °C)     Physical Exam  Constitutional:       Appearance: Normal appearance.   HENT:      Head: Normocephalic.      Mouth/Throat:      Mouth: Mucous membranes are moist.   Eyes:      Conjunctiva/sclera: Conjunctivae normal.      Pupils: Pupils are equal, round, and reactive to light.   Cardiovascular:      Rate and Rhythm: Normal rate and regular rhythm.   Pulmonary:      Breath sounds: Normal breath sounds.   Abdominal:      General: Bowel sounds are normal.      Palpations: Abdomen is soft.   Musculoskeletal:         General: Normal range of motion.      Cervical back: Normal range of motion.   Skin:     General: Skin is warm and dry.   Neurological:      Mental Status: She is  alert and oriented to person, place, and time.   Psychiatric:         Mood and Affect: Mood normal.     Lab Results   Component Value Date    WBC 6.25 12/07/2023    RBC 4.10 (L) 12/07/2023    HGB 12.0 12/07/2023    HCT 37.5 12/07/2023    MCV 91.5 12/07/2023    MCH 29.3 12/07/2023    MCHC 32.0 (L) 12/07/2023    RDW 12.8 12/07/2023     12/07/2023    MPV 9.1 12/07/2023    EOS 0.2 02/23/2023    BASO 0.1 02/23/2023    EOSINOPHIL 4 02/23/2023     CMP  Sodium   Date Value Ref Range Status   09/18/2021 138 136 - 145 mmol/L Final     Sodium Level   Date Value Ref Range Status   12/07/2023 135 (L) 136 - 145 mmol/L Final     Potassium   Date Value Ref Range Status   08/24/2023 5.1 3.5 - 5.2 mmol/L Final     Potassium Level   Date Value Ref Range Status   12/07/2023 4.3 3.5 - 5.1 mmol/L Final     Chloride   Date Value Ref Range Status   09/18/2021 103 100 - 109 mmol/L Final     Carbon Dioxide   Date Value Ref Range Status   12/07/2023 25 22 - 29 mmol/L Final   09/18/2021 25 22 - 33 mmol/L Final     Blood Urea Nitrogen   Date Value Ref Range Status   12/07/2023 16.7 9.8 - 20.1 mg/dL Final   09/18/2021 15 5 - 25 mg/dL Final     Creatinine   Date Value Ref Range Status   12/07/2023 1.08 (H) 0.55 - 1.02 mg/dL Final   09/18/2021 0.96 0.57 - 1.25 mg/dL Final     Calcium   Date Value Ref Range Status   09/18/2021 9.2 8.8 - 10.6 mg/dL Final     Calcium Level Total   Date Value Ref Range Status   12/07/2023 9.1 8.4 - 10.2 mg/dL Final     Albumin Level   Date Value Ref Range Status   12/07/2023 3.2 (L) 3.5 - 5.0 g/dL Final     Bilirubin Total   Date Value Ref Range Status   12/07/2023 0.4 <=1.5 mg/dL Final     Alkaline Phosphatase   Date Value Ref Range Status   12/07/2023 75 40 - 150 unit/L Final     Aspartate Aminotransferase   Date Value Ref Range Status   12/07/2023 13 5 - 34 unit/L Final     Alanine Aminotransferase   Date Value Ref Range Status   12/07/2023 11 0 - 55 unit/L Final     Anion Gap   Date Value Ref Range Status    09/18/2021 10 8 - 16 mmol/L Final     eGFR   Date Value Ref Range Status   12/07/2023 60 mls/min/1.73/m2 Final       Assessment:  Squamous cell carcinoma of vulva:  -presentation:  Left vulvar painful, tender mass for 1 year before seeking care in 09/2023  -vulvar biopsy 09/21/2023: Squamous cell carcinoma, extending to the deep and peripheral margins  -gyn exam 09/15/2023:  Friable, atypical borders, tender, entire left labia replaced by tumor/condylomatous appearance in several areas; left inguinal lymphadenopathy  -Gyn Onc exam 09/29/2023:  Very large vulvar mass involving the mid and posterior left labial structures with areas of ulceration, etc.; no fixation; distal vagina involved; involve the left anterior portion of the anus as well; no extension into deeper tissue; 8 cm x 5 cm;  -MRI pelvis with and without contrast 10/20/2023:  Left vulvar mass involving the left perineum and lower 2/3 of the vagina; 8.0 x 2.5 x 5.4 cm; no urethral invasion; abutment of the anterior rectal wall is indeterminate for invasion; left inguinal lymphadenopathy (2 abnormal lymph nodes, largest 2.0 cm; a 3rd lymph node similar appearance nonenlarged)  -PET-CT 10/31/2023:  No distant metastases; no intrapelvic lymphadenopathy; 2.4 cm hypermetabolic left inguinal lymph node; additionally, hypermetabolic lymph node adjacent to it as well; primary tumor, hypermetabolic, involving a portion of the vaginal canal)  To summarize:  -squamous cell carcinoma of the vulva (left labia)  -disease involving lower 2/3 of the vagina on MRI  -left inguinal lymphadenopathy (regional lymphadenopathy), 2.0 cm on MRI pelvis, 2.4 cm on PET-CT (> 5 mm), therefore, FIGO stage IIIB  -11/01/2023: CBC normal; hemoglobin 13.7; CMP unremarkable; HIV nonreactive  -11/14/2023: Message from Dr. Harjinder Maynard's office:  Recommendation to proceed with chemoradiation therapy; if persistent disease after treatment, then, will plan surgical resection      Father  experienced prostate cancer at age 70   Daughter experienced ovarian cancer at age 31    History of hysterectomy at age 37 for AUB/uterine fibroids  History of Graves disease, treated; subsequently, hypothyroid  History of tobacco abuse         Plan:  Squamous cell carcinoma of vulva  -squamous cell carcinoma of the vulva (left labia)  -disease involving lower 2/3 of the vagina on MRI  -left inguinal lymphadenopathy (regional lymphadenopathy), 2.0 cm on MRI pelvis, 2.4 cm on PET-CT (> 5 mm), therefore, FIGO stage IIIB  -locally advanced disease; unresectable  -radiologically suspicious nodes (left inguinal lymphadenopathy on MRI and every PET-CT)    Primary treatment:   1. Inguinofemoral lymphadenectomy; if positive lymph nodes, then, EBRT +concurrent chemotherapy to primary tumor/inguinofemoral lymph node/pelvic lymph nodes  2. Inguinofemoral lymphadenectomy; if negative lymph node, then, EBRT +concurrent chemotherapy to primary tumor (+/- selective inguinofemoral lymph node coverage)  3. If inguinofemoral lymphadenectomy not performed then:  Consider FNA for enlarged lymph nodes, followed by EBRT +concurrent chemotherapy to primary tumor/inguinofemoral lymph nodes/pelvic lymph nodes    >>>  -11/01/2023: Message from Dr. Harjinder Dumont:  He wishes to perform inguinofemoral lymphadenectomy  -therefore, we will defer chemoradiation therapy until after she heals up from surgery  -11/14/2023: Message from Dr. Harjinder Maynard's office:  Recommendation to proceed with chemoradiation therapy; if persistent disease after treatment, then, will plan surgical resection  >>>    Followed by evaluation of response to EBRT +concurrent chemotherapy:   Consider FDG PET-CT at 3-6 months to assess treatment response after definitive primary treatment  1. If clinically negative for residual tumor at primary site and nodes, then: Surveillance  2. If clinically negative for residual tumor at primary site and nodes, then: Consider biopsy of  tumor bed to confirm pathologic complete response (no sooner than 3 months after completion of treatment); if biopsy negative, then, surveillance; if biopsy positive, then, resection, followed by surveillance for negative margins, and consideration of additional surgery (consider pelvic exenteration for selected cases with a central recurrence), additional EBRT and/or systemic therapy for positive margins for invasive disease   3. If clinically positive for residual tumor at primary site and/or nodes, then:  -resection, followed by surveillance for negative margins  -resection, followed by additional surgery/additional EBRT, N/or systemic therapy for positive margins for invasive disease  -if unresectable, then, consider additional EBRT and/or systemic therapy or best supportive care      Surveillance (after completion of treatment):  1. Interval history and physical every 3-6 months for 2 years, then every 6-12 months for 3-5 years, then annually based on patient's risk of disease recurrence  2. Cervical/vaginal cytology screening for detection of lower genital tract neoplasia (may include HPV testing)  3. Imaging as indicated based on symptoms or examination findings suspicious for recurrence  4. Laboratory assessment as indicated based on symptoms or examination findings suspicious for recurrence  5. Patient Education regarding symptoms of potential recurrence and vulvar dystrophy, periodic self examinations, lifestyle, obesity, exercise, 6 health (including vaginal dilator use and lubricant/moisturizers), smoking cessation, nutrition counseling, and potential long-term and late effects of treatment     Preferred chemotherapy for chemoradiation therapy:  -cisplatin  -carboplatin if patient cisplatin intolerant  -other recommended regimens: Cisplatin/5 FU; capecitabine/mitomycin; gemcitabine; paclitaxel    -proceed with EBRT +concurrent chemotherapy to primary tumor/inguinofemoral lymph node/pelvic lymph  nodes  -Preferred chemotherapy:  Cisplatin   -cisplatin 40 mg per m2 IV day 1, every 7 days x7 weeks with concurrent radiotherapy    Okay to proceed with week #2 of Cisplatin concurrent with radiation via picc line   Return to clinc in 1 week with lab work (cbc,cmp,mg) prior to week #3 of Cisplatin  Follow up with NP in 2 weeks with lab work (cbc,cmp,mg) prior to week #4 of Cisplatin   Check CBC, CMP and magnesium weekly  Three months after completion of chemoradiation therapy, FDG PET-CT to assess response (do not perform FDG PET-CT before 3 months after completion of chemoradiation therapy)   Genetic testing because daughter experienced ovarian cancer at age 31-Scheduled 2024  --------------------------------    Anorexia  -2023: 20 lb weight loss in last 3 months   Continue Megace 400 mg p.o. q.day    Monitoring:  -monitor for hypersensitivity reactions, neuropathy, ototoxicity, renal toxicity, electrolyte imbalance, etc.  Monitor for hypersensitivity reactions, electrolyte abnormalities (hypomagnesemia, hypokalemia), renal dysfunction, peripheral neuropathy, ototoxicity.    vulvar pain  Continue Norco (prescribed by Radiation Oncology)-Refills sent to pharmacy       Consider HIV testing    Imagin. Initial workup:  Chest x-ray/chest CT without contrast; pelvic MRI to aid in surgical and/or radiation treatment planning; consider neck/chest/abdomen/pelvis/groin FDG PET-CT or CT C/A/P with contrast for T2 or larger tumors or metastases suspected, etc.    Follow-up/surveillance imaging:  CT C/A/P with contrast or neck/chest/abdomen/pelvis/groin FDG PET-CT if recurrence/metastases suspected  Consider FDG PET-CT at 3-6 months to assess treatment response after definitive primary treatment    Imaging for suspected or documented recurrence:   Consider neck/chest/abdomen/pelvis/groin FDG PET-CT if not previously performed during surveillance  Consider pelvic MRI to aid in further treatment  planning    History of tobacco abuse  -importance of complete and permanent abstinence discussed and strongly emphasized    History of Graves disease  -treated with radioactive iodine in 1990 9-2000, resulting in iatrogenic hypothyroidism; subsequently, on thyroid replacement therapy   Follow-up with PCP    Above discussed at length with the patient.  All questions answered.  Plan of management discussed in detail.  Potential side effects of cisplatin discussed.  She understands and agrees with this plan.

## 2023-12-07 NOTE — NURSING
0905  Pt did labs, saw provider, and is here for C 2 cisplatin.  Denies any pain but does report issues with constipation and fatigue.  0921  Messaged A Peter NP about pt's cr cl 47.2.  0934  Sterile PICC dressing change with intact skin, dressing, and good blood return.  0942  NP responded to give pt 1 L NS post hydration instead of the 500 ml in plan.

## 2023-12-14 ENCOUNTER — INFUSION (OUTPATIENT)
Dept: INFUSION THERAPY | Facility: HOSPITAL | Age: 58
End: 2023-12-14
Attending: INTERNAL MEDICINE
Payer: MEDICAID

## 2023-12-14 VITALS
DIASTOLIC BLOOD PRESSURE: 83 MMHG | RESPIRATION RATE: 20 BRPM | WEIGHT: 125.25 LBS | HEART RATE: 70 BPM | SYSTOLIC BLOOD PRESSURE: 129 MMHG | TEMPERATURE: 98 F | OXYGEN SATURATION: 100 % | BODY MASS INDEX: 23.05 KG/M2 | HEIGHT: 62 IN

## 2023-12-14 DIAGNOSIS — C51.9 SQUAMOUS CELL CARCINOMA OF VULVA: Primary | ICD-10-CM

## 2023-12-14 DIAGNOSIS — R94.4 DECREASED CREATININE CLEARANCE: ICD-10-CM

## 2023-12-14 PROCEDURE — 96413 CHEMO IV INFUSION 1 HR: CPT

## 2023-12-14 PROCEDURE — 96361 HYDRATE IV INFUSION ADD-ON: CPT

## 2023-12-14 PROCEDURE — 63600175 PHARM REV CODE 636 W HCPCS: Mod: JZ,JG | Performed by: INTERNAL MEDICINE

## 2023-12-14 PROCEDURE — 96366 THER/PROPH/DIAG IV INF ADDON: CPT

## 2023-12-14 PROCEDURE — 25000003 PHARM REV CODE 250: Performed by: INTERNAL MEDICINE

## 2023-12-14 PROCEDURE — 96375 TX/PRO/DX INJ NEW DRUG ADDON: CPT

## 2023-12-14 RX ORDER — SODIUM CHLORIDE 0.9 % (FLUSH) 0.9 %
10 SYRINGE (ML) INJECTION
Status: DISCONTINUED | OUTPATIENT
Start: 2023-12-14 | End: 2023-12-14 | Stop reason: HOSPADM

## 2023-12-14 RX ORDER — HEPARIN 100 UNIT/ML
500 SYRINGE INTRAVENOUS
Status: DISCONTINUED | OUTPATIENT
Start: 2023-12-14 | End: 2023-12-14 | Stop reason: HOSPADM

## 2023-12-14 RX ADMIN — APREPITANT 130 MG: 130 INJECTION, EMULSION INTRAVENOUS at 08:12

## 2023-12-14 RX ADMIN — CISPLATIN 63 MG: 1 INJECTION, SOLUTION INTRAVENOUS at 10:12

## 2023-12-14 RX ADMIN — POTASSIUM CHLORIDE 500 ML/HR: 2 INJECTION, SOLUTION, CONCENTRATE INTRAVENOUS at 09:12

## 2023-12-14 RX ADMIN — SODIUM CHLORIDE 500 ML: 9 INJECTION, SOLUTION INTRAVENOUS at 11:12

## 2023-12-14 RX ADMIN — DEXAMETHASONE SODIUM PHOSPHATE 0.25 MG: 4 INJECTION, SOLUTION INTRA-ARTICULAR; INTRALESIONAL; INTRAMUSCULAR; INTRAVENOUS; SOFT TISSUE at 08:12

## 2023-12-14 NOTE — NURSING
Patient arrived ambulatory to infusion alert and oriented. Pt has complaints of intermittent nausea but is controlled with meds.  Pt is here for Cycle 3 Cisplatin with rads.  PICC dressing changed per protocol.  Pt tolerated tx well.    Renetta Gomes RN

## 2023-12-21 ENCOUNTER — OFFICE VISIT (OUTPATIENT)
Dept: HEMATOLOGY/ONCOLOGY | Facility: CLINIC | Age: 58
End: 2023-12-21
Payer: MEDICAID

## 2023-12-21 ENCOUNTER — TELEPHONE (OUTPATIENT)
Dept: HEMATOLOGY/ONCOLOGY | Facility: CLINIC | Age: 58
End: 2023-12-21
Payer: MEDICAID

## 2023-12-21 ENCOUNTER — INFUSION (OUTPATIENT)
Dept: INFUSION THERAPY | Facility: HOSPITAL | Age: 58
End: 2023-12-21
Attending: INTERNAL MEDICINE
Payer: MEDICAID

## 2023-12-21 DIAGNOSIS — R59.0 INGUINAL LYMPHADENOPATHY: ICD-10-CM

## 2023-12-21 DIAGNOSIS — R79.0 LOW MAGNESIUM LEVEL: Primary | ICD-10-CM

## 2023-12-21 DIAGNOSIS — E83.42 HYPOMAGNESEMIA: Primary | ICD-10-CM

## 2023-12-21 DIAGNOSIS — R94.4 DECREASED CREATININE CLEARANCE: ICD-10-CM

## 2023-12-21 DIAGNOSIS — Z90.710 HISTORY OF HYSTERECTOMY: ICD-10-CM

## 2023-12-21 DIAGNOSIS — H93.13 TINNITUS OF BOTH EARS: ICD-10-CM

## 2023-12-21 DIAGNOSIS — C51.9 SQUAMOUS CELL CARCINOMA OF VULVA: Primary | ICD-10-CM

## 2023-12-21 DIAGNOSIS — C51.9 SQUAMOUS CELL CARCINOMA OF VULVA: ICD-10-CM

## 2023-12-21 PROCEDURE — 4010F PR ACE/ARB THEARPY RXD/TAKEN: ICD-10-PCS | Mod: CPTII,,, | Performed by: NURSE PRACTITIONER

## 2023-12-21 PROCEDURE — 63600175 PHARM REV CODE 636 W HCPCS: Mod: JZ,JG | Performed by: INTERNAL MEDICINE

## 2023-12-21 PROCEDURE — 25000003 PHARM REV CODE 250: Performed by: NURSE PRACTITIONER

## 2023-12-21 PROCEDURE — 99215 OFFICE O/P EST HI 40 MIN: CPT | Mod: S$PBB,,, | Performed by: NURSE PRACTITIONER

## 2023-12-21 PROCEDURE — 99215 PR OFFICE/OUTPT VISIT, EST, LEVL V, 40-54 MIN: ICD-10-PCS | Mod: S$PBB,,, | Performed by: NURSE PRACTITIONER

## 2023-12-21 PROCEDURE — 96367 TX/PROPH/DG ADDL SEQ IV INF: CPT

## 2023-12-21 PROCEDURE — 25000003 PHARM REV CODE 250: Performed by: INTERNAL MEDICINE

## 2023-12-21 PROCEDURE — 96413 CHEMO IV INFUSION 1 HR: CPT

## 2023-12-21 PROCEDURE — 96375 TX/PRO/DX INJ NEW DRUG ADDON: CPT

## 2023-12-21 PROCEDURE — 4010F ACE/ARB THERAPY RXD/TAKEN: CPT | Mod: CPTII,,, | Performed by: NURSE PRACTITIONER

## 2023-12-21 PROCEDURE — 96368 THER/DIAG CONCURRENT INF: CPT

## 2023-12-21 PROCEDURE — 63600175 PHARM REV CODE 636 W HCPCS: Performed by: NURSE PRACTITIONER

## 2023-12-21 PROCEDURE — 99213 OFFICE O/P EST LOW 20 MIN: CPT | Mod: PBBFAC,25 | Performed by: NURSE PRACTITIONER

## 2023-12-21 RX ORDER — HEPARIN 100 UNIT/ML
500 SYRINGE INTRAVENOUS
Status: DISCONTINUED | OUTPATIENT
Start: 2023-12-21 | End: 2023-12-21 | Stop reason: HOSPADM

## 2023-12-21 RX ORDER — LANOLIN ALCOHOL/MO/W.PET/CERES
400 CREAM (GRAM) TOPICAL 2 TIMES DAILY
Qty: 28 TABLET | Refills: 0 | Status: CANCELLED | OUTPATIENT
Start: 2023-12-21 | End: 2024-01-04

## 2023-12-21 RX ORDER — LANOLIN ALCOHOL/MO/W.PET/CERES
400 CREAM (GRAM) TOPICAL 2 TIMES DAILY
Qty: 14 TABLET | Refills: 0 | Status: SHIPPED | OUTPATIENT
Start: 2023-12-21 | End: 2024-01-04

## 2023-12-21 RX ORDER — SODIUM CHLORIDE 0.9 % (FLUSH) 0.9 %
10 SYRINGE (ML) INJECTION
Status: DISCONTINUED | OUTPATIENT
Start: 2023-12-21 | End: 2023-12-21 | Stop reason: HOSPADM

## 2023-12-21 RX ADMIN — POTASSIUM CHLORIDE 500 ML/HR: 2 INJECTION, SOLUTION, CONCENTRATE INTRAVENOUS at 10:12

## 2023-12-21 RX ADMIN — CISPLATIN 63 MG: 1 INJECTION, SOLUTION INTRAVENOUS at 11:12

## 2023-12-21 RX ADMIN — DEXAMETHASONE SODIUM PHOSPHATE 0.25 MG: 4 INJECTION, SOLUTION INTRA-ARTICULAR; INTRALESIONAL; INTRAMUSCULAR; INTRAVENOUS; SOFT TISSUE at 10:12

## 2023-12-21 RX ADMIN — SODIUM CHLORIDE 500 ML: 9 INJECTION, SOLUTION INTRAVENOUS at 12:12

## 2023-12-21 RX ADMIN — APREPITANT 130 MG: 130 INJECTION, EMULSION INTRAVENOUS at 10:12

## 2023-12-21 NOTE — PROGRESS NOTES
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), biopsy 09/21/2023, MRI pelvis 10/20/2023, FDG PET-CT 10/31/2023, disease involving lower 2/3 of vagina on MRI, left inguinal lymphadenopathy on MRI and PET-CT (2.0 cm and 2.4 cm, respectively), FIGO stage IIIB  -father experienced prostate cancer at age 70   -daughter experienced ovarian cancer at age 31  -history of tobacco abuse  -Graves disease, hypothyroidism  -history of hysterectomy for AUB/uterine fibroids at age 37    Current Treatment:  -cisplatin 40 mg per m2 IV day 1, every 7 days x7 weeks with concurrent radiotherapy  start 11/30/23    Treatment History:  Past medical history:  Rasheeda-Parkinson-White syndrome.  Graves disease, treated with radioactive iodine in 1999 or 2000, resulting in iatrogenic hypothyroidism; subsequently, on thyroid replacement therapy.  Hypertension.  Anxiety.  Tobacco abuse.  History of abnormal Pap smears in the past; no regular follow-up.  According to her, history of abnormal Pap smears, HPV 16 positive    Surgical/procedure history:  Hysterectomy for AUB/fibroids at age 37.     Social history:  Single.  Has 3 children.  Lives in [place], [place].  Used to work as a ; does not work anymore.  Has been smoking < half a pack of cigarettes daily for 10-15 years.  Used to drink 3 beers 3 X a week; now, down to or 3 beers once a week.  No illicit drugs.    Family history:  Daughter experienced ovarian cancer at age 31.    Health maintenance:  PCP in [place], [place].  -04/25/2023: Bilateral screening mammogram (comparison:  None):  BI-RADS 1-negative  -no screening colonoscopy ever.    Gyn history:  Pregnancy x3.  Vaginal birth x3.  Hysterectomy for AUB/fibroids at age 37.        History of Present Illness:     Oncologic/Hematologic History:  Oncology History   Squamous cell carcinoma of vulva   10/31/2023 Initial Diagnosis    Squamous cell carcinoma of vulva      10/31/2023 Cancer Staged    Staging form: Vulva, AJCC 8th Edition  - Clinical stage from 10/31/2023: FIGO Stage IIIB, calculated as Stage LINH (cT3, cN2b, cM0)     11/30/2023 -  Chemotherapy    Treatment Summary   Plan Name: OP GYN CISPLATIN WEEKLY  Treatment Goal: Curative  Status: Active  Start Date: 11/30/2023  End Date: 1/11/2024 (Planned)  Provider: Wai Verduzco MD  Chemotherapy: CISplatin (Platinol) 40 mg/m2 = 64 mg in sodium chloride 0.9% 629 mL chemo infusion, 40 mg/m2 = 64 mg, Intravenous, Clinic/HOD 1 time, 3 of 7 cycles  Administration: 64 mg (11/30/2023), 60 mg (12/7/2023), 63 mg (12/14/2023)     58-year-old lady, referred by Dr. Harjinder Dumont, gyn onc, [facility], [place], [place], with squamous cell carcinoma of vulva.    Interval History 12/21/2023: Patient presents to clinic today for a one week follow up and treatment visit for Squamous cell carcinoma of vulva. She presents alone.   She is receiving radiation therapy as well. States that it is going good. Reports tumor is shrinking. She has developed some radiation related skin inflammation. Treating with calmoseptine and Aquaphor cream. Finds helps by coating skin and decreasing burning sensation.   Reports tinnitus. Bilateral. Onset 2 weeks ago. Comes and goes. Has improved since last treatment. Less frequent, about once every couple days. Denies any hearing changes, loss or ear pain.   She is doing well otherwise. Denies any fatigue, weakness, fever, chills, night sweats, headache, chest pain, peripheral edema, cough, SOB, abdominal pain, pelvic pain, N/V, diarrhea, constipation, dysuria, hematuria or other sources of bleeding. No difficulty urinating. No flank pain.   Reports appetite as fair. States can vary from day to day. Weight is down 4 pounds in past 2 weeks. She is drinking Megace once daily. Finds it helps manage appetite.   Reports vaginal pain. 4/10 rating today in office. Taking Norco 1 tablet  daily. Amount of Norco required to control pain has decreased. Was taking 1/2 tablet every 4 hours. She is happy with pain management currently.      Review of Systems:   All systems reviewed and found to be negative except for the symptoms detailed above     Physical Examination:  VITAL SIGNS: B/P 144/83, pulse 68, RR 19, Temp 99.0, 02 sats 99%    GENERAL:  In no apparent distress.    HEAD:  No signs of head trauma.  EYES:  Pupils are equal.  Extraocular motions intact.  Conjunctiva and sclera normal.   EARS:  Hearing grossly intact. Tms appear normal.   MOUTH:  Oropharynx is moist and clear.   NECK:  No adenopathy, no JVD.     CHEST:  Chest with clear breath sounds bilaterally.  No wheezes, rales, rhonchi.    CARDIAC:  Regular rate and rhythm.  S1 and S2, without murmurs, gallops, rubs.  VASCULAR:  No Edema.  Peripheral pulses normal and equal in all extremities.  ABDOMEN:  Soft, without detectable tenderness.  No sign of distention.  No   rebound or guarding, and no masses palpated.   Bowel Sounds normal.  GYN: Vaginal area not examined.   MUSCULOSKELETAL:  Good range of motion of all major joints. Extremities without clubbing, cyanosis or edema.    NEUROLOGIC EXAM:  Alert and oriented x 3.  No focal sensory or strength deficits.   Speech normal.  Follows commands.  PSYCHIATRIC:  Appropriate mood and affect.     Lab Results   Component Value Date    WBC 3.38 (L) 12/21/2023    RBC 3.97 (L) 12/21/2023    HGB 11.3 (L) 12/21/2023    HCT 36.0 (L) 12/21/2023    MCV 90.7 12/21/2023    MCH 28.5 12/21/2023    MCHC 31.4 (L) 12/21/2023    RDW 12.8 12/21/2023     12/21/2023    MPV 8.5 12/21/2023    EOS 0.2 02/23/2023    BASO 0.1 02/23/2023    EOSINOPHIL 4 02/23/2023     CMP  Sodium   Date Value Ref Range Status   09/18/2021 138 136 - 145 mmol/L Final     Sodium Level   Date Value Ref Range Status   12/21/2023 137 136 - 145 mmol/L Final     Potassium   Date Value Ref Range Status   08/24/2023 5.1 3.5 - 5.2 mmol/L Final      Potassium Level   Date Value Ref Range Status   12/21/2023 3.9 3.5 - 5.1 mmol/L Final     Chloride   Date Value Ref Range Status   09/18/2021 103 100 - 109 mmol/L Final     Carbon Dioxide   Date Value Ref Range Status   12/21/2023 26 22 - 29 mmol/L Final   09/18/2021 25 22 - 33 mmol/L Final     Blood Urea Nitrogen   Date Value Ref Range Status   12/21/2023 10.3 9.8 - 20.1 mg/dL Final   09/18/2021 15 5 - 25 mg/dL Final     Creatinine   Date Value Ref Range Status   12/21/2023 0.87 0.55 - 1.02 mg/dL Final   09/18/2021 0.96 0.57 - 1.25 mg/dL Final     Calcium   Date Value Ref Range Status   09/18/2021 9.2 8.8 - 10.6 mg/dL Final     Calcium Level Total   Date Value Ref Range Status   12/21/2023 8.6 8.4 - 10.2 mg/dL Final     Albumin Level   Date Value Ref Range Status   12/21/2023 3.2 (L) 3.5 - 5.0 g/dL Final     Bilirubin Total   Date Value Ref Range Status   12/21/2023 0.3 <=1.5 mg/dL Final     Alkaline Phosphatase   Date Value Ref Range Status   12/21/2023 77 40 - 150 unit/L Final     Aspartate Aminotransferase   Date Value Ref Range Status   12/21/2023 12 5 - 34 unit/L Final     Alanine Aminotransferase   Date Value Ref Range Status   12/21/2023 11 0 - 55 unit/L Final     Anion Gap   Date Value Ref Range Status   09/18/2021 10 8 - 16 mmol/L Final     eGFR   Date Value Ref Range Status   12/21/2023 >60 mls/min/1.73/m2 Final       Assessment:  Squamous cell carcinoma of vulva:  -presentation:  Left vulvar painful, tender mass for 1 year before seeking care in 09/2023  -vulvar biopsy 09/21/2023: Squamous cell carcinoma, extending to the deep and peripheral margins  -gyn exam 09/15/2023:  Friable, atypical borders, tender, entire left labia replaced by tumor/condylomatous appearance in several areas; left inguinal lymphadenopathy  -Gyn Onc exam 09/29/2023:  Very large vulvar mass involving the mid and posterior left labial structures with areas of ulceration, etc.; no fixation; distal vagina involved; involve the  left anterior portion of the anus as well; no extension into deeper tissue; 8 cm x 5 cm;  -MRI pelvis with and without contrast 10/20/2023:  Left vulvar mass involving the left perineum and lower 2/3 of the vagina; 8.0 x 2.5 x 5.4 cm; no urethral invasion; abutment of the anterior rectal wall is indeterminate for invasion; left inguinal lymphadenopathy (2 abnormal lymph nodes, largest 2.0 cm; a 3rd lymph node similar appearance nonenlarged)  -PET-CT 10/31/2023:  No distant metastases; no intrapelvic lymphadenopathy; 2.4 cm hypermetabolic left inguinal lymph node; additionally, hypermetabolic lymph node adjacent to it as well; primary tumor, hypermetabolic, involving a portion of the vaginal canal)  To summarize:  -squamous cell carcinoma of the vulva (left labia)  -disease involving lower 2/3 of the vagina on MRI  -left inguinal lymphadenopathy (regional lymphadenopathy), 2.0 cm on MRI pelvis, 2.4 cm on PET-CT (> 5 mm), therefore, FIGO stage IIIB  -11/01/2023: CBC normal; hemoglobin 13.7; CMP unremarkable; HIV nonreactive  -11/14/2023: Message from Dr. Harjinder Maynard's office:  Recommendation to proceed with chemoradiation therapy; if persistent disease after treatment, then, will plan surgical resection      Father experienced prostate cancer at age 70   Daughter experienced ovarian cancer at age 31    History of hysterectomy at age 37 for AUB/uterine fibroids  History of Graves disease, treated; subsequently, hypothyroid  History of tobacco abuse         Plan:  Squamous cell carcinoma of vulva  -squamous cell carcinoma of the vulva (left labia)  -disease involving lower 2/3 of the vagina on MRI  -left inguinal lymphadenopathy (regional lymphadenopathy), 2.0 cm on MRI pelvis, 2.4 cm on PET-CT (> 5 mm), therefore, FIGO stage IIIB  -locally advanced disease; unresectable  -radiologically suspicious nodes (left inguinal lymphadenopathy on MRI and every PET-CT)    Primary treatment:   1. Inguinofemoral lymphadenectomy;  if positive lymph nodes, then, EBRT +concurrent chemotherapy to primary tumor/inguinofemoral lymph node/pelvic lymph nodes  2. Inguinofemoral lymphadenectomy; if negative lymph node, then, EBRT +concurrent chemotherapy to primary tumor (+/- selective inguinofemoral lymph node coverage)  3. If inguinofemoral lymphadenectomy not performed then:  Consider FNA for enlarged lymph nodes, followed by EBRT +concurrent chemotherapy to primary tumor/inguinofemoral lymph nodes/pelvic lymph nodes    >>>  -11/01/2023: Message from Dr. Harjinder Dumont:  He wishes to perform inguinofemoral lymphadenectomy  -therefore, we will defer chemoradiation therapy until after she heals up from surgery  -11/14/2023: Message from Dr. Harjinder Maynard's office:  Recommendation to proceed with chemoradiation therapy; if persistent disease after treatment, then, will plan surgical resection  >>>    Followed by evaluation of response to EBRT +concurrent chemotherapy:   Consider FDG PET-CT at 3-6 months to assess treatment response after definitive primary treatment  1. If clinically negative for residual tumor at primary site and nodes, then: Surveillance  2. If clinically negative for residual tumor at primary site and nodes, then: Consider biopsy of tumor bed to confirm pathologic complete response (no sooner than 3 months after completion of treatment); if biopsy negative, then, surveillance; if biopsy positive, then, resection, followed by surveillance for negative margins, and consideration of additional surgery (consider pelvic exenteration for selected cases with a central recurrence), additional EBRT and/or systemic therapy for positive margins for invasive disease   3. If clinically positive for residual tumor at primary site and/or nodes, then:  -resection, followed by surveillance for negative margins  -resection, followed by additional surgery/additional EBRT, N/or systemic therapy for positive margins for invasive disease  -if  unresectable, then, consider additional EBRT and/or systemic therapy or best supportive care      Surveillance (after completion of treatment):  1. Interval history and physical every 3-6 months for 2 years, then every 6-12 months for 3-5 years, then annually based on patient's risk of disease recurrence  2. Cervical/vaginal cytology screening for detection of lower genital tract neoplasia (may include HPV testing)  3. Imaging as indicated based on symptoms or examination findings suspicious for recurrence  4. Laboratory assessment as indicated based on symptoms or examination findings suspicious for recurrence  5. Patient Education regarding symptoms of potential recurrence and vulvar dystrophy, periodic self examinations, lifestyle, obesity, exercise, 6 health (including vaginal dilator use and lubricant/moisturizers), smoking cessation, nutrition counseling, and potential long-term and late effects of treatment     Preferred chemotherapy for chemoradiation therapy:  -cisplatin  -carboplatin if patient cisplatin intolerant  -other recommended regimens: Cisplatin/5 FU; capecitabine/mitomycin; gemcitabine; paclitaxel    -proceed with EBRT +concurrent chemotherapy to primary tumor/inguinofemoral lymph node/pelvic lymph nodes  -Preferred chemotherapy:  Cisplatin   -cisplatin 40 mg per m2 IV day 1, every 7 days x7 weeks with concurrent radiotherapy    Okay to proceed with week # 4 of Cisplatin concurrent with radiation via picc line - labs reviewed and stable.   Return to clinc in 1 week with lab work (cbc,cmp,mg) prior to week # 5 of Cisplatin  Follow up with NP in 2 weeks with lab work (cbc,cmp,mg) prior to week # 6 of Cisplatin   Check CBC, CMP and magnesium weekly  Three months after completion of chemoradiation therapy, FDG PET-CT to assess response (do not perform FDG PET-CT before 3 months after completion of chemoradiation therapy)   Genetic testing because daughter experienced ovarian cancer at age  31-Scheduled 2024     Reported tinnitus -Bilateral.  Patient did not have a baseline hearing test done. Denies any changes in hearing, no hearing loss or pain. Discussed with Dr. Verduzco. He is ok with patient proceeding with treatment today. Audiogram ordered. Scheduled for 2024.     Anorexia  -2023: 20 lb weight loss in last 3 months   Continue Megace 400 mg p.o. q.day    Hypomagnesemia - start oral magnesium 400 mg twice daily x 14 days. Prescription sent to patient's pharmacy. Magnesium level checked weekly.     Monitoring:  -monitor for hypersensitivity reactions, neuropathy, ototoxicity, renal toxicity, electrolyte imbalance, etc.  Monitor for hypersensitivity reactions, electrolyte abnormalities (hypomagnesemia, hypokalemia), renal dysfunction, peripheral neuropathy, ototoxicity.    vulvar pain  Continue Norco (prescribed by Radiation Oncology) - continue topical creams as prescribed by radiation oncologist.     Imagin. Initial workup:  Chest x-ray/chest CT without contrast; pelvic MRI to aid in surgical and/or radiation treatment planning; consider neck/chest/abdomen/pelvis/groin FDG PET-CT or CT C/A/P with contrast for T2 or larger tumors or metastases suspected, etc.    Follow-up/surveillance imaging:  CT C/A/P with contrast or neck/chest/abdomen/pelvis/groin FDG PET-CT if recurrence/metastases suspected  Consider FDG PET-CT at 3-6 months to assess treatment response after definitive primary treatment    Imaging for suspected or documented recurrence:   Consider neck/chest/abdomen/pelvis/groin FDG PET-CT if not previously performed during surveillance  Consider pelvic MRI to aid in further treatment planning    History of tobacco abuse  -importance of complete and permanent abstinence discussed and strongly emphasized    History of Graves disease  -treated with radioactive iodine in 1990-, resulting in iatrogenic hypothyroidism; subsequently, on thyroid replacement therapy    Follow-up with PCP    Above discussed with the patient.  All questions answered.  Plan of management discussed. Potential side effects of cisplatin discussed.  She understands and agrees with this plan.

## 2023-12-21 NOTE — Clinical Note
Okay to treat.  RTC in 1 week for cycle # 5, treatment only, cbc, cmp and mag done prior  RTC in 2 weeks with NP, treatment, cbc, cmp and mag done prior.

## 2023-12-27 RX ORDER — SODIUM CHLORIDE 0.9 % (FLUSH) 0.9 %
10 SYRINGE (ML) INJECTION
Status: CANCELLED | OUTPATIENT
Start: 2023-12-28

## 2023-12-27 RX ORDER — HEPARIN 100 UNIT/ML
500 SYRINGE INTRAVENOUS
Status: CANCELLED | OUTPATIENT
Start: 2023-12-28

## 2023-12-28 ENCOUNTER — INFUSION (OUTPATIENT)
Dept: INFUSION THERAPY | Facility: HOSPITAL | Age: 58
End: 2023-12-28
Attending: INTERNAL MEDICINE
Payer: MEDICAID

## 2023-12-28 VITALS
BODY MASS INDEX: 23.4 KG/M2 | RESPIRATION RATE: 20 BRPM | DIASTOLIC BLOOD PRESSURE: 72 MMHG | SYSTOLIC BLOOD PRESSURE: 135 MMHG | HEART RATE: 67 BPM | WEIGHT: 127.19 LBS | OXYGEN SATURATION: 98 % | TEMPERATURE: 98 F | HEIGHT: 62 IN

## 2023-12-28 DIAGNOSIS — E83.42 HYPOMAGNESEMIA: ICD-10-CM

## 2023-12-28 DIAGNOSIS — C51.9 SQUAMOUS CELL CARCINOMA OF VULVA: Primary | ICD-10-CM

## 2023-12-28 DIAGNOSIS — E83.42 HYPOMAGNESEMIA: Primary | ICD-10-CM

## 2023-12-28 PROCEDURE — 96361 HYDRATE IV INFUSION ADD-ON: CPT

## 2023-12-28 PROCEDURE — A4216 STERILE WATER/SALINE, 10 ML: HCPCS | Performed by: NURSE PRACTITIONER

## 2023-12-28 PROCEDURE — 96413 CHEMO IV INFUSION 1 HR: CPT

## 2023-12-28 PROCEDURE — 63600175 PHARM REV CODE 636 W HCPCS: Mod: JZ,JG | Performed by: NURSE PRACTITIONER

## 2023-12-28 PROCEDURE — 25000003 PHARM REV CODE 250: Performed by: NURSE PRACTITIONER

## 2023-12-28 PROCEDURE — 96367 TX/PROPH/DG ADDL SEQ IV INF: CPT

## 2023-12-28 PROCEDURE — 96375 TX/PRO/DX INJ NEW DRUG ADDON: CPT

## 2023-12-28 RX ORDER — MAGNESIUM SULFATE 1 G/100ML
1 INJECTION INTRAVENOUS
Status: CANCELLED
Start: 2023-12-28

## 2023-12-28 RX ORDER — SODIUM CHLORIDE 0.9 % (FLUSH) 0.9 %
10 SYRINGE (ML) INJECTION
Status: DISCONTINUED | OUTPATIENT
Start: 2023-12-28 | End: 2023-12-28 | Stop reason: HOSPADM

## 2023-12-28 RX ORDER — MAGNESIUM SULFATE 1 G/100ML
1 INJECTION INTRAVENOUS
Status: DISCONTINUED | OUTPATIENT
Start: 2023-12-28 | End: 2023-12-28

## 2023-12-28 RX ORDER — HEPARIN 100 UNIT/ML
500 SYRINGE INTRAVENOUS
Status: DISCONTINUED | OUTPATIENT
Start: 2023-12-28 | End: 2023-12-28 | Stop reason: HOSPADM

## 2023-12-28 RX ADMIN — POTASSIUM CHLORIDE 500 ML/HR: 2 INJECTION, SOLUTION, CONCENTRATE INTRAVENOUS at 08:12

## 2023-12-28 RX ADMIN — APREPITANT 130 MG: 130 INJECTION, EMULSION INTRAVENOUS at 10:12

## 2023-12-28 RX ADMIN — DEXAMETHASONE SODIUM PHOSPHATE 0.25 MG: 4 INJECTION, SOLUTION INTRA-ARTICULAR; INTRALESIONAL; INTRAMUSCULAR; INTRAVENOUS; SOFT TISSUE at 10:12

## 2023-12-28 RX ADMIN — Medication 10 ML: at 12:12

## 2023-12-28 RX ADMIN — CISPLATIN 63 MG: 1 INJECTION, SOLUTION INTRAVENOUS at 10:12

## 2023-12-28 RX ADMIN — SODIUM CHLORIDE 500 ML: 9 INJECTION, SOLUTION INTRAVENOUS at 11:12

## 2023-12-28 NOTE — NURSING
0817  Pt did labs and is here for C 5 cisplatin.  Pt is accomp by her father.  Pt rates LOP 9/10 to vaginal area from radiation burns.  States they had to stop radiation since Tues to allow some healing and will restart tomorrow.  Pt does report nausea, constipation, and fatigue which comes and goes.  Mag level 1.5 and L Capdebosq NP notified.  Pt is on oral magnesium and was told to continue to completion.  Pt is scheduled for 1 gm mag as part of her treatment so NP is adding another 1 gm to total 2 gm administration of mag IV today.  Pt's wt is increasing.

## 2024-01-04 ENCOUNTER — INFUSION (OUTPATIENT)
Dept: INFUSION THERAPY | Facility: HOSPITAL | Age: 59
End: 2024-01-04
Attending: INTERNAL MEDICINE
Payer: MEDICAID

## 2024-01-04 ENCOUNTER — TELEPHONE (OUTPATIENT)
Dept: HEMATOLOGY/ONCOLOGY | Facility: CLINIC | Age: 59
End: 2024-01-04
Payer: MEDICAID

## 2024-01-04 ENCOUNTER — APPOINTMENT (OUTPATIENT)
Dept: HEMATOLOGY/ONCOLOGY | Facility: CLINIC | Age: 59
End: 2024-01-04
Payer: MEDICAID

## 2024-01-04 ENCOUNTER — OFFICE VISIT (OUTPATIENT)
Dept: HEMATOLOGY/ONCOLOGY | Facility: CLINIC | Age: 59
End: 2024-01-04
Payer: MEDICAID

## 2024-01-04 VITALS
OXYGEN SATURATION: 100 % | HEIGHT: 63 IN | BODY MASS INDEX: 22.44 KG/M2 | HEART RATE: 67 BPM | WEIGHT: 126.63 LBS | RESPIRATION RATE: 20 BRPM | TEMPERATURE: 98 F | DIASTOLIC BLOOD PRESSURE: 68 MMHG | SYSTOLIC BLOOD PRESSURE: 106 MMHG

## 2024-01-04 DIAGNOSIS — Z90.710 HISTORY OF HYSTERECTOMY: ICD-10-CM

## 2024-01-04 DIAGNOSIS — C51.9 SQUAMOUS CELL CARCINOMA OF VULVA: ICD-10-CM

## 2024-01-04 DIAGNOSIS — C51.9 SQUAMOUS CELL CARCINOMA OF VULVA: Primary | ICD-10-CM

## 2024-01-04 DIAGNOSIS — T45.1X5A CHEMOTHERAPY-INDUCED NEUTROPENIA: ICD-10-CM

## 2024-01-04 DIAGNOSIS — Z86.39 HISTORY OF GRAVES' DISEASE: ICD-10-CM

## 2024-01-04 DIAGNOSIS — R63.0 ANOREXIA: ICD-10-CM

## 2024-01-04 DIAGNOSIS — D69.6 THROMBOCYTOPENIA: ICD-10-CM

## 2024-01-04 DIAGNOSIS — R59.0 INGUINAL LYMPHADENOPATHY: ICD-10-CM

## 2024-01-04 DIAGNOSIS — Z72.0 TOBACCO ABUSE: ICD-10-CM

## 2024-01-04 DIAGNOSIS — D70.1 CHEMOTHERAPY-INDUCED NEUTROPENIA: ICD-10-CM

## 2024-01-04 LAB
ABS NEUT CALC (OHS): 0.86 X10(3)/MCL (ref 2.1–9.2)
ALBUMIN SERPL-MCNC: 3.1 G/DL (ref 3.5–5)
ALBUMIN/GLOB SERPL: 1 RATIO (ref 1.1–2)
ALP SERPL-CCNC: 80 UNIT/L (ref 40–150)
ALT SERPL-CCNC: 8 UNIT/L (ref 0–55)
ANISOCYTOSIS BLD QL SMEAR: SLIGHT
AST SERPL-CCNC: 11 UNIT/L (ref 5–34)
BASOPHILS NFR BLD MANUAL: 0.01 X10(3)/MCL (ref 0–0.2)
BASOPHILS NFR BLD MANUAL: 1 % (ref 0–2)
BILIRUB SERPL-MCNC: 0.5 MG/DL
BUN SERPL-MCNC: 10 MG/DL (ref 9.8–20.1)
CALCIUM SERPL-MCNC: 8.9 MG/DL (ref 8.4–10.2)
CHLORIDE SERPL-SCNC: 99 MMOL/L (ref 98–107)
CO2 SERPL-SCNC: 28 MMOL/L (ref 22–29)
CREAT SERPL-MCNC: 0.96 MG/DL (ref 0.55–1.02)
EOSINOPHIL NFR BLD MANUAL: 0.02 X10(3)/MCL (ref 0–0.9)
EOSINOPHIL NFR BLD MANUAL: 2 % (ref 0–8)
ERYTHROCYTE [DISTWIDTH] IN BLOOD BY AUTOMATED COUNT: 13.5 % (ref 11.5–17)
GFR SERPLBLD CREATININE-BSD FMLA CKD-EPI: >60 MLS/MIN/1.73/M2
GLOBULIN SER-MCNC: 3.2 GM/DL (ref 2.4–3.5)
GLUCOSE SERPL-MCNC: 120 MG/DL (ref 74–100)
HCT VFR BLD AUTO: 26.8 % (ref 37–47)
HGB BLD-MCNC: 9.1 G/DL (ref 12–16)
LYMPHOCYTES NFR BLD MANUAL: 0.09 X10(3)/MCL
LYMPHOCYTES NFR BLD MANUAL: 9 % (ref 13–40)
MAGNESIUM SERPL-MCNC: 1.6 MG/DL (ref 1.6–2.6)
MCH RBC QN AUTO: 29.7 PG (ref 27–31)
MCHC RBC AUTO-ENTMCNC: 34 G/DL (ref 33–36)
MCV RBC AUTO: 87.6 FL (ref 80–94)
MONOCYTES NFR BLD MANUAL: 0.05 X10(3)/MCL (ref 0.1–1.3)
MONOCYTES NFR BLD MANUAL: 5 % (ref 2–11)
NEUTROPHILS NFR BLD MANUAL: 83 % (ref 47–80)
NRBC BLD AUTO-RTO: 0 %
OVALOCYTES (OLG): SLIGHT
PLATELET # BLD AUTO: 71 X10(3)/MCL (ref 130–400)
PLATELET # BLD EST: ABNORMAL 10*3/UL
PMV BLD AUTO: 9 FL (ref 7.4–10.4)
POIKILOCYTOSIS BLD QL SMEAR: SLIGHT
POTASSIUM SERPL-SCNC: 4.1 MMOL/L (ref 3.5–5.1)
PROT SERPL-MCNC: 6.3 GM/DL (ref 6.4–8.3)
RBC # BLD AUTO: 3.06 X10(6)/MCL (ref 4.2–5.4)
RBC MORPH BLD: ABNORMAL
SODIUM SERPL-SCNC: 134 MMOL/L (ref 136–145)
WBC # SPEC AUTO: 1.04 X10(3)/MCL (ref 4.5–11.5)

## 2024-01-04 PROCEDURE — 36415 COLL VENOUS BLD VENIPUNCTURE: CPT

## 2024-01-04 PROCEDURE — 3078F DIAST BP <80 MM HG: CPT | Mod: CPTII,,, | Performed by: NURSE PRACTITIONER

## 2024-01-04 PROCEDURE — 83735 ASSAY OF MAGNESIUM: CPT

## 2024-01-04 PROCEDURE — 1160F RVW MEDS BY RX/DR IN RCRD: CPT | Mod: CPTII,,, | Performed by: NURSE PRACTITIONER

## 2024-01-04 PROCEDURE — 85027 COMPLETE CBC AUTOMATED: CPT

## 2024-01-04 PROCEDURE — 1159F MED LIST DOCD IN RCRD: CPT | Mod: CPTII,,, | Performed by: NURSE PRACTITIONER

## 2024-01-04 PROCEDURE — 99215 OFFICE O/P EST HI 40 MIN: CPT | Mod: PBBFAC | Performed by: NURSE PRACTITIONER

## 2024-01-04 PROCEDURE — 96523 IRRIG DRUG DELIVERY DEVICE: CPT

## 2024-01-04 PROCEDURE — 3008F BODY MASS INDEX DOCD: CPT | Mod: CPTII,,, | Performed by: NURSE PRACTITIONER

## 2024-01-04 PROCEDURE — 80053 COMPREHEN METABOLIC PANEL: CPT

## 2024-01-04 PROCEDURE — 99215 OFFICE O/P EST HI 40 MIN: CPT | Mod: S$PBB,,, | Performed by: NURSE PRACTITIONER

## 2024-01-04 PROCEDURE — 3074F SYST BP LT 130 MM HG: CPT | Mod: CPTII,,, | Performed by: NURSE PRACTITIONER

## 2024-01-04 RX ORDER — MORPHINE SULFATE 30 MG/1
30 TABLET, FILM COATED, EXTENDED RELEASE ORAL 2 TIMES DAILY
COMMUNITY
Start: 2024-01-03

## 2024-01-04 NOTE — PROGRESS NOTES
Reason for Follow-up:  Chemotherapy teaching Cisplatin for Squamous Cell Carcinoma of Vulva    Current Treatment:  -cisplatin 40 mg per m2 IV day 1, every 7 days x7 weeks with concurrent radiotherapy  start 11/30/23    Treatment History:  Past medical history:  Rashedea-Parkinson-White syndrome.  Graves disease, treated with radioactive iodine in 1999 or 2000, resulting in iatrogenic hypothyroidism; subsequently, on thyroid replacement therapy.  Hypertension.  Anxiety.  Tobacco abuse.  History of abnormal Pap smears in the past; no regular follow-up.  According to her, history of abnormal Pap smears, HPV 16 positive    Surgical/procedure history:  Hysterectomy for AUB/fibroids at age 37.     Social history:  Single.  Has 3 children.  Lives in [place], [place].  Used to work as a ; does not work anymore.  Has been smoking < half a pack of cigarettes daily for 10-15 years.  Used to drink 3 beers 3 X a week; now, down to or 3 beers once a week.  No illicit drugs.    Family history:  Daughter experienced ovarian cancer at age 31.    Health maintenance:  PCP in [place], [place].  -04/25/2023: Bilateral screening mammogram (comparison:  None):  BI-RADS 1-negative  -no screening colonoscopy ever.    Gyn history:  Pregnancy x3.  Vaginal birth x3.  Hysterectomy for AUB/fibroids at age 37.        History of Present Illness:     Oncologic/Hematologic History:  Oncology History   Squamous cell carcinoma of vulva   10/31/2023 Initial Diagnosis    Squamous cell carcinoma of vulva     10/31/2023 Cancer Staged    Staging form: Vulva, AJCC 8th Edition  - Clinical stage from 10/31/2023: FIGO Stage IIIB, calculated as Stage LINH (cT3, cN2b, cM0)     11/30/2023 -  Chemotherapy    Treatment Summary   Plan Name: OP GYN CISPLATIN WEEKLY  Treatment Goal: Curative  Status: Active  Start Date: 11/30/2023  End Date: 1/11/2024 (Planned)  Provider: Wai Verduzco MD  Chemotherapy: CISplatin (Platinol) 40 mg/m2 = 64 mg in sodium  chloride 0.9% 629 mL chemo infusion, 40 mg/m2 = 64 mg, Intravenous, Clinic/HOD 1 time, 5 of 7 cycles  Administration: 64 mg (11/30/2023), 60 mg (12/7/2023), 63 mg (12/14/2023), 63 mg (12/21/2023), 63 mg (12/28/2023)     58-year-old lady, referred by Dr. Harjinder Dumont, gyn onc, [facility], [place], [place], with squamous cell carcinoma of vulva.    Interval History 1/4/2023: Patient presents to clinic along with her  for a scheduled follow-up visit she is due to receive cisplatin today cycle 6.  She reports compliance with radiation daily.  Patient reports some weakness nausea at times which is managed well with Zofran she also reports a cough that intermittently comes on and off patient also reports lower extremity swelling bilaterally mostly in the evening times by the morning time symptoms are gone she reports a good appetite and energy level lab work reviewed with patient, low platelet count 71   ANC low 863 she denies any fever or signs of infection she also denies any abnormal vaginal bleeding. discussed plan of care and follow-up.    Review of Systems:   Review of Systems   Respiratory:  Positive for cough.    Cardiovascular:  Positive for leg swelling.   Gastrointestinal:  Positive for constipation and nausea.   Neurological:  Positive for weakness.   All other systems reviewed and are negative.      Physical Exam  Constitutional:       Appearance: Normal appearance.   HENT:      Head: Normocephalic.      Mouth/Throat:      Mouth: Mucous membranes are moist.   Eyes:      Conjunctiva/sclera: Conjunctivae normal.      Pupils: Pupils are equal, round, and reactive to light.   Cardiovascular:      Rate and Rhythm: Normal rate and regular rhythm.   Pulmonary:      Breath sounds: Normal breath sounds.   Abdominal:      General: Bowel sounds are normal.      Palpations: Abdomen is soft.   Musculoskeletal:      Cervical back: Normal range of motion.      Right lower leg: Edema  present.   Skin:     General: Skin is warm and dry.   Neurological:      Mental Status: She is alert and oriented to person, place, and time.   Psychiatric:         Mood and Affect: Mood normal.       Lab Results   Component Value Date    WBC 1.04 (LL) 01/04/2024    RBC 3.06 (L) 01/04/2024    HGB 9.1 (L) 01/04/2024    HCT 26.8 (L) 01/04/2024    MCV 87.6 01/04/2024    MCH 29.7 01/04/2024    MCHC 34.0 01/04/2024    RDW 13.5 01/04/2024    PLT 71 (L) 01/04/2024    MPV 9.0 01/04/2024    EOS 0.2 02/23/2023    BASO 0.1 02/23/2023    EOSINOPHIL 4 02/23/2023     CMP  Sodium   Date Value Ref Range Status   09/18/2021 138 136 - 145 mmol/L Final     Sodium Level   Date Value Ref Range Status   01/04/2024 134 (L) 136 - 145 mmol/L Final     Potassium   Date Value Ref Range Status   08/24/2023 5.1 3.5 - 5.2 mmol/L Final     Potassium Level   Date Value Ref Range Status   01/04/2024 4.1 3.5 - 5.1 mmol/L Final     Chloride   Date Value Ref Range Status   09/18/2021 103 100 - 109 mmol/L Final     Carbon Dioxide   Date Value Ref Range Status   01/04/2024 28 22 - 29 mmol/L Final   09/18/2021 25 22 - 33 mmol/L Final     Blood Urea Nitrogen   Date Value Ref Range Status   01/04/2024 10.0 9.8 - 20.1 mg/dL Final   09/18/2021 15 5 - 25 mg/dL Final     Creatinine   Date Value Ref Range Status   01/04/2024 0.96 0.55 - 1.02 mg/dL Final   09/18/2021 0.96 0.57 - 1.25 mg/dL Final     Calcium   Date Value Ref Range Status   09/18/2021 9.2 8.8 - 10.6 mg/dL Final     Calcium Level Total   Date Value Ref Range Status   01/04/2024 8.9 8.4 - 10.2 mg/dL Final     Albumin Level   Date Value Ref Range Status   01/04/2024 3.1 (L) 3.5 - 5.0 g/dL Final     Bilirubin Total   Date Value Ref Range Status   01/04/2024 0.5 <=1.5 mg/dL Final     Alkaline Phosphatase   Date Value Ref Range Status   01/04/2024 80 40 - 150 unit/L Final     Aspartate Aminotransferase   Date Value Ref Range Status   01/04/2024 11 5 - 34 unit/L Final     Alanine Aminotransferase   Date  Value Ref Range Status   01/04/2024 8 0 - 55 unit/L Final     Anion Gap   Date Value Ref Range Status   09/18/2021 10 8 - 16 mmol/L Final     eGFR   Date Value Ref Range Status   01/04/2024 >60 mls/min/1.73/m2 Final       Assessment:  Squamous cell carcinoma of vulva:  -presentation:  Left vulvar painful, tender mass for 1 year before seeking care in 09/2023  -vulvar biopsy 09/21/2023: Squamous cell carcinoma, extending to the deep and peripheral margins  -gyn exam 09/15/2023:  Friable, atypical borders, tender, entire left labia replaced by tumor/condylomatous appearance in several areas; left inguinal lymphadenopathy  -Gyn Onc exam 09/29/2023:  Very large vulvar mass involving the mid and posterior left labial structures with areas of ulceration, etc.; no fixation; distal vagina involved; involve the left anterior portion of the anus as well; no extension into deeper tissue; 8 cm x 5 cm;  -MRI pelvis with and without contrast 10/20/2023:  Left vulvar mass involving the left perineum and lower 2/3 of the vagina; 8.0 x 2.5 x 5.4 cm; no urethral invasion; abutment of the anterior rectal wall is indeterminate for invasion; left inguinal lymphadenopathy (2 abnormal lymph nodes, largest 2.0 cm; a 3rd lymph node similar appearance nonenlarged)  -PET-CT 10/31/2023:  No distant metastases; no intrapelvic lymphadenopathy; 2.4 cm hypermetabolic left inguinal lymph node; additionally, hypermetabolic lymph node adjacent to it as well; primary tumor, hypermetabolic, involving a portion of the vaginal canal)  To summarize:  -squamous cell carcinoma of the vulva (left labia)  -disease involving lower 2/3 of the vagina on MRI  -left inguinal lymphadenopathy (regional lymphadenopathy), 2.0 cm on MRI pelvis, 2.4 cm on PET-CT (> 5 mm), therefore, FIGO stage IIIB  -11/01/2023: CBC normal; hemoglobin 13.7; CMP unremarkable; HIV nonreactive  -11/14/2023: Message from Dr. Harjinder Maynard's office:  Recommendation to proceed with  chemoradiation therapy; if persistent disease after treatment, then, will plan surgical resection      Father experienced prostate cancer at age 70   Daughter experienced ovarian cancer at age 31    History of hysterectomy at age 37 for AUB/uterine fibroids  History of Graves disease, treated; subsequently, hypothyroid  History of tobacco abuse         Plan:  Squamous cell carcinoma of vulva  -squamous cell carcinoma of the vulva (left labia)  -disease involving lower 2/3 of the vagina on MRI  -left inguinal lymphadenopathy (regional lymphadenopathy), 2.0 cm on MRI pelvis, 2.4 cm on PET-CT (> 5 mm), therefore, FIGO stage IIIB  -locally advanced disease; unresectable  -radiologically suspicious nodes (left inguinal lymphadenopathy on MRI and every PET-CT)    Primary treatment:   1. Inguinofemoral lymphadenectomy; if positive lymph nodes, then, EBRT +concurrent chemotherapy to primary tumor/inguinofemoral lymph node/pelvic lymph nodes  2. Inguinofemoral lymphadenectomy; if negative lymph node, then, EBRT +concurrent chemotherapy to primary tumor (+/- selective inguinofemoral lymph node coverage)  3. If inguinofemoral lymphadenectomy not performed then:  Consider FNA for enlarged lymph nodes, followed by EBRT +concurrent chemotherapy to primary tumor/inguinofemoral lymph nodes/pelvic lymph nodes    >>>  -11/01/2023: Message from Dr. Harjinder Dumont:  He wishes to perform inguinofemoral lymphadenectomy  -therefore, we will defer chemoradiation therapy until after she heals up from surgery  -11/14/2023: Message from Dr. Harjinder Maynard's office:  Recommendation to proceed with chemoradiation therapy; if persistent disease after treatment, then, will plan surgical resection  >>>    Followed by evaluation of response to EBRT +concurrent chemotherapy:   Consider FDG PET-CT at 3-6 months to assess treatment response after definitive primary treatment  1. If clinically negative for residual tumor at primary site and nodes, then:  Surveillance  2. If clinically negative for residual tumor at primary site and nodes, then: Consider biopsy of tumor bed to confirm pathologic complete response (no sooner than 3 months after completion of treatment); if biopsy negative, then, surveillance; if biopsy positive, then, resection, followed by surveillance for negative margins, and consideration of additional surgery (consider pelvic exenteration for selected cases with a central recurrence), additional EBRT and/or systemic therapy for positive margins for invasive disease   3. If clinically positive for residual tumor at primary site and/or nodes, then:  -resection, followed by surveillance for negative margins  -resection, followed by additional surgery/additional EBRT, N/or systemic therapy for positive margins for invasive disease  -if unresectable, then, consider additional EBRT and/or systemic therapy or best supportive care      Surveillance (after completion of treatment):  1. Interval history and physical every 3-6 months for 2 years, then every 6-12 months for 3-5 years, then annually based on patient's risk of disease recurrence  2. Cervical/vaginal cytology screening for detection of lower genital tract neoplasia (may include HPV testing)  3. Imaging as indicated based on symptoms or examination findings suspicious for recurrence  4. Laboratory assessment as indicated based on symptoms or examination findings suspicious for recurrence  5. Patient Education regarding symptoms of potential recurrence and vulvar dystrophy, periodic self examinations, lifestyle, obesity, exercise, 6 health (including vaginal dilator use and lubricant/moisturizers), smoking cessation, nutrition counseling, and potential long-term and late effects of treatment     Preferred chemotherapy for chemoradiation therapy:  -cisplatin  -carboplatin if patient cisplatin intolerant  -other recommended regimens: Cisplatin/5 FU; capecitabine/mitomycin; gemcitabine;  paclitaxel    -proceed with EBRT +concurrent chemotherapy to primary tumor/inguinofemoral lymph node/pelvic lymph nodes  -Preferred chemotherapy:  Cisplatin   -cisplatin 40 mg per m2 IV day 1, every 7 days x7 weeks with concurrent radiotherapy    - therefore, Discontinue week 6 of Cisplatin today  -Follow up with NP in 1 week with lab work (cbc,cmp,mg) prior to week #7 of Cisplatin   -Check CBC, CMP and magnesium weekly  -Three months after completion of chemoradiation therapy, FDG PET-CT to assess response (do not perform FDG PET-CT before 3 months after completion of chemoradiation therapy)   -Genetic testing because daughter experienced ovarian cancer at age 31-Scheduled 2024  --------------------------------    Thrombocytopenia  2024: Plt 71  Bleeding Precautions    Anorexia  -2023: 20 lb weight loss in last 3 months   Continue Megace 400 mg p.o. q.day    Monitoring:  -monitor for hypersensitivity reactions, neuropathy, ototoxicity, renal toxicity, electrolyte imbalance, etc.  Monitor for hypersensitivity reactions, electrolyte abnormalities (hypomagnesemia, hypokalemia), renal dysfunction, peripheral neuropathy, ototoxicity.    vulvar pain  Continue Norco (prescribed by Radiation Oncology)-Refills sent to pharmacy     Consider HIV testing    Imagin. Initial workup:  Chest x-ray/chest CT without contrast; pelvic MRI to aid in surgical and/or radiation treatment planning; consider neck/chest/abdomen/pelvis/groin FDG PET-CT or CT C/A/P with contrast for T2 or larger tumors or metastases suspected, etc.    Follow-up/surveillance imaging:  CT C/A/P with contrast or neck/chest/abdomen/pelvis/groin FDG PET-CT if recurrence/metastases suspected  Consider FDG PET-CT at 3-6 months to assess treatment response after definitive primary treatment    Imaging for suspected or documented recurrence:   Consider neck/chest/abdomen/pelvis/groin FDG PET-CT if not previously performed during  surveillance  Consider pelvic MRI to aid in further treatment planning    History of tobacco abuse  -importance of complete and permanent abstinence discussed and strongly emphasized    History of Graves disease  -treated with radioactive iodine in 1990 9-2000, resulting in iatrogenic hypothyroidism; subsequently, on thyroid replacement therapy   Follow-up with PCP    Above discussed at length with the patient.  All questions answered.  Plan of management discussed in detail.  Potential side effects of cisplatin discussed.  She understands and agrees with this plan.

## 2024-01-04 NOTE — NURSING
Patient arrived ambulatory to infusion form provider visit. Pt is alert and oriented with no acute complaints at this time.  Chemo held today due to cytopenia per Salima Green NP.  Pt is here for PICC line dressing change per protocol, pt tolerated well.     Renetta Gomes RN

## 2024-01-10 NOTE — PROGRESS NOTES
Reason for Follow-up:  Chemotherapy teaching Cisplatin for Squamous Cell Carcinoma of Vulva    Current Treatment:  -cisplatin 40 mg per m2 IV day 1, every 7 days x7 weeks with concurrent radiotherapy  start 11/30/23    Treatment Modificatons:  1/4/24: Week 7 held due to   1/11/24: Week 8 held due to       Treatment History:  Past medical history:  Rasheeda-Parkinson-White syndrome.  Graves disease, treated with radioactive iodine in 1999 or 2000, resulting in iatrogenic hypothyroidism; subsequently, on thyroid replacement therapy.  Hypertension.  Anxiety.  Tobacco abuse.  History of abnormal Pap smears in the past; no regular follow-up.  According to her, history of abnormal Pap smears, HPV 16 positive    Surgical/procedure history:  Hysterectomy for AUB/fibroids at age 37.     Social history:  Single.  Has 3 children.  Lives in [place], [place].  Used to work as a ; does not work anymore.  Has been smoking < half a pack of cigarettes daily for 10-15 years.  Used to drink 3 beers 3 X a week; now, down to or 3 beers once a week.  No illicit drugs.    Family history:  Daughter experienced ovarian cancer at age 31.    Health maintenance:  PCP in [place], [place].  -04/25/2023: Bilateral screening mammogram (comparison:  None):  BI-RADS 1-negative  -no screening colonoscopy ever.    Gyn history:  Pregnancy x3.  Vaginal birth x3.  Hysterectomy for AUB/fibroids at age 37.        History of Present Illness:     Oncologic/Hematologic History:  Oncology History   Squamous cell carcinoma of vulva   10/31/2023 Initial Diagnosis    Squamous cell carcinoma of vulva     10/31/2023 Cancer Staged    Staging form: Vulva, AJCC 8th Edition  - Clinical stage from 10/31/2023: FIGO Stage IIIB, calculated as Stage LINH (cT3, cN2b, cM0)     11/30/2023 -  Chemotherapy    Treatment Summary   Plan Name: OP GYN CISPLATIN WEEKLY  Treatment Goal: Curative  Status: Active  Start Date: 11/30/2023  End Date: 1/11/2024  (Planned)  Provider: Wai Verduzco MD  Chemotherapy: CISplatin (Platinol) 40 mg/m2 = 64 mg in sodium chloride 0.9% 629 mL chemo infusion, 40 mg/m2 = 64 mg, Intravenous, Clinic/HOD 1 time, 5 of 6 cycles  Administration: 64 mg (11/30/2023), 60 mg (12/7/2023), 63 mg (12/14/2023), 63 mg (12/21/2023), 63 mg (12/28/2023)     58-year-old lady, referred by Dr. Harjinder Dumont, gyn onc, [facility], [place], [place], with squamous cell carcinoma of vulva.    Interval History 1/11/2023: Patient presents to clinic along with her  for a scheduled follow-up visit she is due to receive cisplatin today cycle 6.  She reports compliance with radiation daily.  Patient reports doing well without any reportable symtpoms. She reports good appetite and eneregy level. She was held last week due to . Hgb low 7.9, she denies any abnormal bleeding of any kind. Lab work reviewed with patient ANC lower at 690. She denies any fever or signs of infection. Discussed plan of care and follow up.      Review of Systems:   Review of Systems   Neurological:  Positive for weakness.   All other systems reviewed and are negative.      Physical Exam  Constitutional:       Appearance: Normal appearance.   HENT:      Head: Normocephalic.      Mouth/Throat:      Mouth: Mucous membranes are moist.   Eyes:      Conjunctiva/sclera: Conjunctivae normal.      Pupils: Pupils are equal, round, and reactive to light.   Cardiovascular:      Rate and Rhythm: Normal rate and regular rhythm.   Pulmonary:      Breath sounds: Normal breath sounds.   Abdominal:      General: Bowel sounds are normal.      Palpations: Abdomen is soft.   Musculoskeletal:      Cervical back: Normal range of motion.   Skin:     General: Skin is warm and dry.   Neurological:      Mental Status: She is alert and oriented to person, place, and time.   Psychiatric:         Mood and Affect: Mood normal.       Vitals:    01/11/24 0753   BP: 123/71   Pulse:  69   Resp: 20   Temp: 98.1 °F (36.7 °C)     Lab Results   Component Value Date    WBC 1.04 (LL) 01/11/2024    RBC 2.64 (L) 01/11/2024    HGB 7.9 (L) 01/11/2024    HCT 23.8 (L) 01/11/2024    MCV 90.2 01/11/2024    MCH 29.9 01/11/2024    MCHC 33.2 01/11/2024    RDW 15.3 01/11/2024     (L) 01/11/2024    MPV 8.9 01/11/2024    EOS 0.2 02/23/2023    BASO 0.1 02/23/2023    EOSINOPHIL 4 02/23/2023     CMP  Sodium   Date Value Ref Range Status   09/18/2021 138 136 - 145 mmol/L Final     Sodium Level   Date Value Ref Range Status   01/11/2024 132 (L) 136 - 145 mmol/L Final     Potassium   Date Value Ref Range Status   08/24/2023 5.1 3.5 - 5.2 mmol/L Final     Potassium Level   Date Value Ref Range Status   01/11/2024 4.2 3.5 - 5.1 mmol/L Final     Chloride   Date Value Ref Range Status   09/18/2021 103 100 - 109 mmol/L Final     Carbon Dioxide   Date Value Ref Range Status   01/11/2024 26 22 - 29 mmol/L Final   09/18/2021 25 22 - 33 mmol/L Final     Blood Urea Nitrogen   Date Value Ref Range Status   01/11/2024 8.3 (L) 9.8 - 20.1 mg/dL Final   09/18/2021 15 5 - 25 mg/dL Final     Creatinine   Date Value Ref Range Status   01/11/2024 1.01 0.55 - 1.02 mg/dL Final   09/18/2021 0.96 0.57 - 1.25 mg/dL Final     Calcium   Date Value Ref Range Status   09/18/2021 9.2 8.8 - 10.6 mg/dL Final     Calcium Level Total   Date Value Ref Range Status   01/11/2024 8.9 8.4 - 10.2 mg/dL Final     Albumin Level   Date Value Ref Range Status   01/11/2024 3.0 (L) 3.5 - 5.0 g/dL Final     Bilirubin Total   Date Value Ref Range Status   01/11/2024 0.5 <=1.5 mg/dL Final     Alkaline Phosphatase   Date Value Ref Range Status   01/11/2024 80 40 - 150 unit/L Final     Aspartate Aminotransferase   Date Value Ref Range Status   01/11/2024 9 5 - 34 unit/L Final     Alanine Aminotransferase   Date Value Ref Range Status   01/11/2024 5 0 - 55 unit/L Final     Anion Gap   Date Value Ref Range Status   09/18/2021 10 8 - 16 mmol/L Final     eGFR   Date  Value Ref Range Status   01/11/2024 >60 mls/min/1.73/m2 Final       Assessment:  Squamous cell carcinoma of vulva:  -presentation:  Left vulvar painful, tender mass for 1 year before seeking care in 09/2023  -vulvar biopsy 09/21/2023: Squamous cell carcinoma, extending to the deep and peripheral margins  -gyn exam 09/15/2023:  Friable, atypical borders, tender, entire left labia replaced by tumor/condylomatous appearance in several areas; left inguinal lymphadenopathy  -Gyn Onc exam 09/29/2023:  Very large vulvar mass involving the mid and posterior left labial structures with areas of ulceration, etc.; no fixation; distal vagina involved; involve the left anterior portion of the anus as well; no extension into deeper tissue; 8 cm x 5 cm;  -MRI pelvis with and without contrast 10/20/2023:  Left vulvar mass involving the left perineum and lower 2/3 of the vagina; 8.0 x 2.5 x 5.4 cm; no urethral invasion; abutment of the anterior rectal wall is indeterminate for invasion; left inguinal lymphadenopathy (2 abnormal lymph nodes, largest 2.0 cm; a 3rd lymph node similar appearance nonenlarged)  -PET-CT 10/31/2023:  No distant metastases; no intrapelvic lymphadenopathy; 2.4 cm hypermetabolic left inguinal lymph node; additionally, hypermetabolic lymph node adjacent to it as well; primary tumor, hypermetabolic, involving a portion of the vaginal canal)  To summarize:  -squamous cell carcinoma of the vulva (left labia)  -disease involving lower 2/3 of the vagina on MRI  -left inguinal lymphadenopathy (regional lymphadenopathy), 2.0 cm on MRI pelvis, 2.4 cm on PET-CT (> 5 mm), therefore, FIGO stage IIIB  -11/01/2023: CBC normal; hemoglobin 13.7; CMP unremarkable; HIV nonreactive  -11/14/2023: Message from Dr. Harjinder Maynard's office:  Recommendation to proceed with chemoradiation therapy; if persistent disease after treatment, then, will plan surgical resection      Father experienced prostate cancer at age 70   Daughter  experienced ovarian cancer at age 31    History of hysterectomy at age 37 for AUB/uterine fibroids  History of Graves disease, treated; subsequently, hypothyroid  History of tobacco abuse         Plan:  Squamous cell carcinoma of vulva  -squamous cell carcinoma of the vulva (left labia)  -disease involving lower 2/3 of the vagina on MRI  -left inguinal lymphadenopathy (regional lymphadenopathy), 2.0 cm on MRI pelvis, 2.4 cm on PET-CT (> 5 mm), therefore, FIGO stage IIIB  -locally advanced disease; unresectable  -radiologically suspicious nodes (left inguinal lymphadenopathy on MRI and every PET-CT)    Primary treatment:   1. Inguinofemoral lymphadenectomy; if positive lymph nodes, then, EBRT +concurrent chemotherapy to primary tumor/inguinofemoral lymph node/pelvic lymph nodes  2. Inguinofemoral lymphadenectomy; if negative lymph node, then, EBRT +concurrent chemotherapy to primary tumor (+/- selective inguinofemoral lymph node coverage)  3. If inguinofemoral lymphadenectomy not performed then:  Consider FNA for enlarged lymph nodes, followed by EBRT +concurrent chemotherapy to primary tumor/inguinofemoral lymph nodes/pelvic lymph nodes    >>>  -11/01/2023: Message from Dr. Harjinder Dumont:  He wishes to perform inguinofemoral lymphadenectomy  -therefore, we will defer chemoradiation therapy until after she heals up from surgery  -11/14/2023: Message from Dr. Harjinder Maynard's office:  Recommendation to proceed with chemoradiation therapy; if persistent disease after treatment, then, will plan surgical resection  >>>    Followed by evaluation of response to EBRT +concurrent chemotherapy:   Consider FDG PET-CT at 3-6 months to assess treatment response after definitive primary treatment  1. If clinically negative for residual tumor at primary site and nodes, then: Surveillance  2. If clinically negative for residual tumor at primary site and nodes, then: Consider biopsy of tumor bed to confirm pathologic complete response  (no sooner than 3 months after completion of treatment); if biopsy negative, then, surveillance; if biopsy positive, then, resection, followed by surveillance for negative margins, and consideration of additional surgery (consider pelvic exenteration for selected cases with a central recurrence), additional EBRT and/or systemic therapy for positive margins for invasive disease   3. If clinically positive for residual tumor at primary site and/or nodes, then:  -resection, followed by surveillance for negative margins  -resection, followed by additional surgery/additional EBRT, N/or systemic therapy for positive margins for invasive disease  -if unresectable, then, consider additional EBRT and/or systemic therapy or best supportive care      Surveillance (after completion of treatment):  1. Interval history and physical every 3-6 months for 2 years, then every 6-12 months for 3-5 years, then annually based on patient's risk of disease recurrence  2. Cervical/vaginal cytology screening for detection of lower genital tract neoplasia (may include HPV testing)  3. Imaging as indicated based on symptoms or examination findings suspicious for recurrence  4. Laboratory assessment as indicated based on symptoms or examination findings suspicious for recurrence  5. Patient Education regarding symptoms of potential recurrence and vulvar dystrophy, periodic self examinations, lifestyle, obesity, exercise, 6 health (including vaginal dilator use and lubricant/moisturizers), smoking cessation, nutrition counseling, and potential long-term and late effects of treatment     Preferred chemotherapy for chemoradiation therapy:  -cisplatin  -carboplatin if patient cisplatin intolerant  -other recommended regimens: Cisplatin/5 FU; capecitabine/mitomycin; gemcitabine; paclitaxel    -proceed with EBRT +concurrent chemotherapy to primary tumor/inguinofemoral lymph node/pelvic lymph nodes  -Preferred chemotherapy:  Cisplatin   -cisplatin 40  mg per m2 IV day 1, every 7 days x7 weeks with concurrent radiotherapy    -  therefore, Discontinue week 7 of Cisplatin today  -Start Cipro 500mg po bid x 7 days-rx sent to pharmacy  -Follow up with NP in 1 week with lab work (cbc,cmp,mg) prior to week #8 of Cisplatin   -Check CBC, CMP and magnesium weekly  -Three months after completion of chemoradiation therapy, FDG PET-CT to assess response (do not perform FDG PET-CT before 3 months after completion of chemoradiation therapy)   -Genetic testing because daughter experienced ovarian cancer at age 31-Scheduled 2024  --------------------------------    Anorexia  -2023: 20 lb weight loss in last 3 months   Continue Megace 400 mg p.o. q.day    Monitoring:  -monitor for hypersensitivity reactions, neuropathy, ototoxicity, renal toxicity, electrolyte imbalance, etc.  Monitor for hypersensitivity reactions, electrolyte abnormalities (hypomagnesemia, hypokalemia), renal dysfunction, peripheral neuropathy, ototoxicity.    vulvar pain  Continue Norco (prescribed by Radiation Oncology)    Consider HIV testing    Imagin. Initial workup:  Chest x-ray/chest CT without contrast; pelvic MRI to aid in surgical and/or radiation treatment planning; consider neck/chest/abdomen/pelvis/groin FDG PET-CT or CT C/A/P with contrast for T2 or larger tumors or metastases suspected, etc.    Follow-up/surveillance imaging:  CT C/A/P with contrast or neck/chest/abdomen/pelvis/groin FDG PET-CT if recurrence/metastases suspected  Consider FDG PET-CT at 3-6 months to assess treatment response after definitive primary treatment    Imaging for suspected or documented recurrence:   Consider neck/chest/abdomen/pelvis/groin FDG PET-CT if not previously performed during surveillance  Consider pelvic MRI to aid in further treatment planning    History of tobacco abuse  -importance of complete and permanent abstinence discussed and strongly emphasized    History of Graves  disease  -treated with radioactive iodine in 1990 9-2000, resulting in iatrogenic hypothyroidism; subsequently, on thyroid replacement therapy   Follow-up with PCP    Above discussed at length with the patient.  All questions answered.  Plan of management discussed in detail.  Potential side effects of cisplatin discussed.  She understands and agrees with this plan.

## 2024-01-11 ENCOUNTER — INFUSION (OUTPATIENT)
Dept: INFUSION THERAPY | Facility: HOSPITAL | Age: 59
End: 2024-01-11
Attending: INTERNAL MEDICINE
Payer: MEDICAID

## 2024-01-11 ENCOUNTER — OFFICE VISIT (OUTPATIENT)
Dept: HEMATOLOGY/ONCOLOGY | Facility: CLINIC | Age: 59
End: 2024-01-11
Payer: MEDICAID

## 2024-01-11 ENCOUNTER — APPOINTMENT (OUTPATIENT)
Dept: HEMATOLOGY/ONCOLOGY | Facility: CLINIC | Age: 59
End: 2024-01-11
Payer: MEDICAID

## 2024-01-11 ENCOUNTER — TELEPHONE (OUTPATIENT)
Dept: HEMATOLOGY/ONCOLOGY | Facility: CLINIC | Age: 59
End: 2024-01-11
Payer: MEDICAID

## 2024-01-11 VITALS
TEMPERATURE: 98 F | WEIGHT: 125.38 LBS | RESPIRATION RATE: 20 BRPM | HEART RATE: 69 BPM | OXYGEN SATURATION: 100 % | DIASTOLIC BLOOD PRESSURE: 71 MMHG | BODY MASS INDEX: 23.07 KG/M2 | SYSTOLIC BLOOD PRESSURE: 123 MMHG | HEIGHT: 62 IN

## 2024-01-11 VITALS — HEIGHT: 62 IN | BODY MASS INDEX: 23.05 KG/M2 | WEIGHT: 125.25 LBS

## 2024-01-11 DIAGNOSIS — C51.9 SQUAMOUS CELL CARCINOMA OF VULVA: ICD-10-CM

## 2024-01-11 DIAGNOSIS — Z72.0 TOBACCO ABUSE: ICD-10-CM

## 2024-01-11 DIAGNOSIS — D70.1 CHEMOTHERAPY-INDUCED NEUTROPENIA: Primary | ICD-10-CM

## 2024-01-11 DIAGNOSIS — T45.1X5A CHEMOTHERAPY-INDUCED NEUTROPENIA: Primary | ICD-10-CM

## 2024-01-11 DIAGNOSIS — R59.0 INGUINAL LYMPHADENOPATHY: ICD-10-CM

## 2024-01-11 DIAGNOSIS — C51.9 SQUAMOUS CELL CARCINOMA OF VULVA: Primary | ICD-10-CM

## 2024-01-11 DIAGNOSIS — H93.13 TINNITUS OF BOTH EARS: ICD-10-CM

## 2024-01-11 DIAGNOSIS — Z86.39 HISTORY OF GRAVES' DISEASE: ICD-10-CM

## 2024-01-11 DIAGNOSIS — Z90.710 HISTORY OF HYSTERECTOMY: ICD-10-CM

## 2024-01-11 DIAGNOSIS — R10.2 PAIN IN VULVA: ICD-10-CM

## 2024-01-11 LAB
ALBUMIN SERPL-MCNC: 3 G/DL (ref 3.5–5)
ALBUMIN/GLOB SERPL: 1 RATIO (ref 1.1–2)
ALP SERPL-CCNC: 80 UNIT/L (ref 40–150)
ALT SERPL-CCNC: 5 UNIT/L (ref 0–55)
ANISOCYTOSIS BLD QL SMEAR: ABNORMAL
AST SERPL-CCNC: 9 UNIT/L (ref 5–34)
BASOPHILS # BLD AUTO: 0.01 X10(3)/MCL
BASOPHILS NFR BLD AUTO: 1 %
BILIRUB SERPL-MCNC: 0.5 MG/DL
BUN SERPL-MCNC: 8.3 MG/DL (ref 9.8–20.1)
CALCIUM SERPL-MCNC: 8.9 MG/DL (ref 8.4–10.2)
CHLORIDE SERPL-SCNC: 100 MMOL/L (ref 98–107)
CO2 SERPL-SCNC: 26 MMOL/L (ref 22–29)
CREAT SERPL-MCNC: 1.01 MG/DL (ref 0.55–1.02)
EOSINOPHIL # BLD AUTO: 0.03 X10(3)/MCL (ref 0–0.9)
EOSINOPHIL NFR BLD AUTO: 2.9 %
ERYTHROCYTE [DISTWIDTH] IN BLOOD BY AUTOMATED COUNT: 15.3 % (ref 11.5–17)
GFR SERPLBLD CREATININE-BSD FMLA CKD-EPI: >60 MLS/MIN/1.73/M2
GLOBULIN SER-MCNC: 3.1 GM/DL (ref 2.4–3.5)
GLUCOSE SERPL-MCNC: 129 MG/DL (ref 74–100)
HCT VFR BLD AUTO: 23.8 % (ref 37–47)
HGB BLD-MCNC: 7.9 G/DL (ref 12–16)
IMM GRANULOCYTES # BLD AUTO: 0.02 X10(3)/MCL (ref 0–0.04)
IMM GRANULOCYTES NFR BLD AUTO: 1.9 %
LYMPHOCYTES # BLD AUTO: 0.1 X10(3)/MCL (ref 0.6–4.6)
LYMPHOCYTES NFR BLD AUTO: 9.6 %
MAGNESIUM SERPL-MCNC: 1.8 MG/DL (ref 1.6–2.6)
MCH RBC QN AUTO: 29.9 PG (ref 27–31)
MCHC RBC AUTO-ENTMCNC: 33.2 G/DL (ref 33–36)
MCV RBC AUTO: 90.2 FL (ref 80–94)
MONOCYTES # BLD AUTO: 0.19 X10(3)/MCL (ref 0.1–1.3)
MONOCYTES NFR BLD AUTO: 18.3 %
NEUTROPHILS # BLD AUTO: 0.69 X10(3)/MCL (ref 2.1–9.2)
NEUTROPHILS NFR BLD AUTO: 66.3 %
NRBC BLD AUTO-RTO: 0 %
OVALOCYTES (OLG): SLIGHT
PLATELET # BLD AUTO: 114 X10(3)/MCL (ref 130–400)
PLATELET # BLD EST: ABNORMAL 10*3/UL
PMV BLD AUTO: 8.9 FL (ref 7.4–10.4)
POTASSIUM SERPL-SCNC: 4.2 MMOL/L (ref 3.5–5.1)
PROT SERPL-MCNC: 6.1 GM/DL (ref 6.4–8.3)
RBC # BLD AUTO: 2.64 X10(6)/MCL (ref 4.2–5.4)
SODIUM SERPL-SCNC: 132 MMOL/L (ref 136–145)
WBC # SPEC AUTO: 1.04 X10(3)/MCL (ref 4.5–11.5)

## 2024-01-11 PROCEDURE — 1160F RVW MEDS BY RX/DR IN RCRD: CPT | Mod: CPTII,,, | Performed by: NURSE PRACTITIONER

## 2024-01-11 PROCEDURE — 25000003 PHARM REV CODE 250: Performed by: INTERNAL MEDICINE

## 2024-01-11 PROCEDURE — 1159F MED LIST DOCD IN RCRD: CPT | Mod: CPTII,,, | Performed by: NURSE PRACTITIONER

## 2024-01-11 PROCEDURE — 3078F DIAST BP <80 MM HG: CPT | Mod: CPTII,,, | Performed by: NURSE PRACTITIONER

## 2024-01-11 PROCEDURE — 99214 OFFICE O/P EST MOD 30 MIN: CPT | Mod: S$PBB,,, | Performed by: NURSE PRACTITIONER

## 2024-01-11 PROCEDURE — 3074F SYST BP LT 130 MM HG: CPT | Mod: CPTII,,, | Performed by: NURSE PRACTITIONER

## 2024-01-11 PROCEDURE — 36415 COLL VENOUS BLD VENIPUNCTURE: CPT

## 2024-01-11 PROCEDURE — 3008F BODY MASS INDEX DOCD: CPT | Mod: CPTII,,, | Performed by: NURSE PRACTITIONER

## 2024-01-11 PROCEDURE — 99215 OFFICE O/P EST HI 40 MIN: CPT | Mod: PBBFAC | Performed by: NURSE PRACTITIONER

## 2024-01-11 PROCEDURE — A4216 STERILE WATER/SALINE, 10 ML: HCPCS | Performed by: INTERNAL MEDICINE

## 2024-01-11 PROCEDURE — 83735 ASSAY OF MAGNESIUM: CPT

## 2024-01-11 PROCEDURE — 85025 COMPLETE CBC W/AUTO DIFF WBC: CPT

## 2024-01-11 PROCEDURE — 80053 COMPREHEN METABOLIC PANEL: CPT

## 2024-01-11 RX ORDER — HEPARIN 100 UNIT/ML
500 SYRINGE INTRAVENOUS
OUTPATIENT
Start: 2024-01-11

## 2024-01-11 RX ORDER — SODIUM CHLORIDE 0.9 % (FLUSH) 0.9 %
10 SYRINGE (ML) INJECTION
Status: DISCONTINUED | OUTPATIENT
Start: 2024-01-11 | End: 2024-01-11 | Stop reason: HOSPADM

## 2024-01-11 RX ORDER — CIPROFLOXACIN 500 MG/1
500 TABLET ORAL 2 TIMES DAILY
Qty: 14 TABLET | Refills: 0 | Status: SHIPPED | OUTPATIENT
Start: 2024-01-11 | End: 2024-01-18

## 2024-01-11 RX ORDER — HEPARIN 100 UNIT/ML
500 SYRINGE INTRAVENOUS
Status: DISCONTINUED | OUTPATIENT
Start: 2024-01-11 | End: 2024-01-11 | Stop reason: HOSPADM

## 2024-01-11 RX ORDER — SODIUM CHLORIDE 0.9 % (FLUSH) 0.9 %
10 SYRINGE (ML) INJECTION
OUTPATIENT
Start: 2024-01-11

## 2024-01-11 RX ADMIN — Medication 10 ML: at 09:01

## 2024-01-11 NOTE — NURSING
0856  Pt did labs, saw NP, and is here for PICC dressing change.  Pt's , H/H 7.9/23.8, and cr cl 48.  Pt rates LOP 4/10 to vaginal area from radiation.  Does report some constipation. Denies any SOB, N/V, increased heart rate or fatigue.  Plan is to hold trx one more week.  Pt instructed to rest and eat healthy foods and drink plenty of fluids.  Pt is accomp by her father.  Pt was crying as she was upset her trx was being held.  Pt given neutropenic precaution handout with verbal instructions to go to ER for temp greater than 100.4.  Pt verbalized understanding.  Pt has radiation scheduled until 1-25-24.  0975  Sterile PICC dressing change with dried blood noted and cleaned.  Ports flush easily and blood return noted.  0971  Spoke with A Peter NP about decreased cr cl 48 and pt being on cisplatin but no order today for fluids.  Pt to return in one week for labs, provider, and trx.

## 2024-01-18 ENCOUNTER — OFFICE VISIT (OUTPATIENT)
Dept: HEMATOLOGY/ONCOLOGY | Facility: CLINIC | Age: 59
End: 2024-01-18
Payer: MEDICAID

## 2024-01-18 ENCOUNTER — APPOINTMENT (OUTPATIENT)
Dept: HEMATOLOGY/ONCOLOGY | Facility: CLINIC | Age: 59
End: 2024-01-18
Payer: MEDICAID

## 2024-01-18 VITALS
OXYGEN SATURATION: 100 % | WEIGHT: 121.38 LBS | TEMPERATURE: 98 F | SYSTOLIC BLOOD PRESSURE: 124 MMHG | DIASTOLIC BLOOD PRESSURE: 83 MMHG | RESPIRATION RATE: 20 BRPM | HEART RATE: 75 BPM | HEIGHT: 62 IN | BODY MASS INDEX: 22.34 KG/M2

## 2024-01-18 DIAGNOSIS — R59.0 INGUINAL LYMPHADENOPATHY: ICD-10-CM

## 2024-01-18 DIAGNOSIS — D69.6 THROMBOCYTOPENIA: ICD-10-CM

## 2024-01-18 DIAGNOSIS — Z86.39 HISTORY OF GRAVES' DISEASE: ICD-10-CM

## 2024-01-18 DIAGNOSIS — C51.9 SQUAMOUS CELL CARCINOMA OF VULVA: Primary | ICD-10-CM

## 2024-01-18 DIAGNOSIS — C51.9 SQUAMOUS CELL CARCINOMA OF VULVA: ICD-10-CM

## 2024-01-18 DIAGNOSIS — Z90.710 HISTORY OF HYSTERECTOMY: ICD-10-CM

## 2024-01-18 DIAGNOSIS — R63.4 UNINTENTIONAL WEIGHT LOSS: ICD-10-CM

## 2024-01-18 LAB
ADDITIONAL FINDINGS (OHS): NORMAL
ALBUMIN SERPL-MCNC: 3.1 G/DL (ref 3.5–5)
ALBUMIN/GLOB SERPL: 1 RATIO (ref 1.1–2)
ALP SERPL-CCNC: 76 UNIT/L (ref 40–150)
ALT SERPL-CCNC: 5 UNIT/L (ref 0–55)
AST SERPL-CCNC: 9 UNIT/L (ref 5–34)
BASOPHILS # BLD AUTO: 0.02 X10(3)/MCL
BASOPHILS NFR BLD AUTO: 0.9 %
BILIRUB SERPL-MCNC: 0.4 MG/DL
BUN SERPL-MCNC: 7.3 MG/DL (ref 9.8–20.1)
CALCIUM SERPL-MCNC: 9.1 MG/DL (ref 8.4–10.2)
CHLORIDE SERPL-SCNC: 103 MMOL/L (ref 98–107)
CO2 SERPL-SCNC: 24 MMOL/L (ref 22–29)
CREAT SERPL-MCNC: 0.98 MG/DL (ref 0.55–1.02)
EOSINOPHIL # BLD AUTO: 0.05 X10(3)/MCL (ref 0–0.9)
EOSINOPHIL NFR BLD AUTO: 2.4 %
ERYTHROCYTE [DISTWIDTH] IN BLOOD BY AUTOMATED COUNT: 18.1 % (ref 11.5–17)
GFR SERPLBLD CREATININE-BSD FMLA CKD-EPI: >60 MLS/MIN/1.73/M2
GLOBULIN SER-MCNC: 3.2 GM/DL (ref 2.4–3.5)
GLUCOSE SERPL-MCNC: 113 MG/DL (ref 74–100)
HCT VFR BLD AUTO: 22.5 % (ref 37–47)
HGB BLD-MCNC: 7.6 G/DL (ref 12–16)
IMM GRANULOCYTES # BLD AUTO: 0.15 X10(3)/MCL (ref 0–0.04)
IMM GRANULOCYTES NFR BLD AUTO: 7.1 %
LYMPHOCYTES # BLD AUTO: 0.2 X10(3)/MCL (ref 0.6–4.6)
LYMPHOCYTES NFR BLD AUTO: 9.5 %
MAGNESIUM SERPL-MCNC: 1.7 MG/DL (ref 1.6–2.6)
MCH RBC QN AUTO: 31.1 PG (ref 27–31)
MCHC RBC AUTO-ENTMCNC: 33.8 G/DL (ref 33–36)
MCV RBC AUTO: 92.2 FL (ref 80–94)
MONOCYTES # BLD AUTO: 0.48 X10(3)/MCL (ref 0.1–1.3)
MONOCYTES NFR BLD AUTO: 22.7 %
NEUTROPHILS # BLD AUTO: 1.21 X10(3)/MCL (ref 2.1–9.2)
NEUTROPHILS NFR BLD AUTO: 57.4 %
NRBC BLD AUTO-RTO: 0 %
PLATELET # BLD AUTO: 195 X10(3)/MCL (ref 130–400)
PLATELET # BLD EST: ADEQUATE 10*3/UL
PMV BLD AUTO: 9 FL (ref 7.4–10.4)
POTASSIUM SERPL-SCNC: 4.2 MMOL/L (ref 3.5–5.1)
PROT SERPL-MCNC: 6.3 GM/DL (ref 6.4–8.3)
RBC # BLD AUTO: 2.44 X10(6)/MCL (ref 4.2–5.4)
RBC MORPH BLD: NORMAL
SODIUM SERPL-SCNC: 135 MMOL/L (ref 136–145)
WBC # SPEC AUTO: 2.11 X10(3)/MCL (ref 4.5–11.5)

## 2024-01-18 PROCEDURE — 1159F MED LIST DOCD IN RCRD: CPT | Mod: CPTII,,, | Performed by: NURSE PRACTITIONER

## 2024-01-18 PROCEDURE — 80053 COMPREHEN METABOLIC PANEL: CPT

## 2024-01-18 PROCEDURE — 1160F RVW MEDS BY RX/DR IN RCRD: CPT | Mod: CPTII,,, | Performed by: NURSE PRACTITIONER

## 2024-01-18 PROCEDURE — 99215 OFFICE O/P EST HI 40 MIN: CPT | Mod: PBBFAC | Performed by: NURSE PRACTITIONER

## 2024-01-18 PROCEDURE — 99215 OFFICE O/P EST HI 40 MIN: CPT | Mod: S$PBB,,, | Performed by: NURSE PRACTITIONER

## 2024-01-18 PROCEDURE — 3074F SYST BP LT 130 MM HG: CPT | Mod: CPTII,,, | Performed by: NURSE PRACTITIONER

## 2024-01-18 PROCEDURE — 85025 COMPLETE CBC W/AUTO DIFF WBC: CPT

## 2024-01-18 PROCEDURE — 83735 ASSAY OF MAGNESIUM: CPT

## 2024-01-18 PROCEDURE — 3008F BODY MASS INDEX DOCD: CPT | Mod: CPTII,,, | Performed by: NURSE PRACTITIONER

## 2024-01-18 PROCEDURE — 3079F DIAST BP 80-89 MM HG: CPT | Mod: CPTII,,, | Performed by: NURSE PRACTITIONER

## 2024-01-18 PROCEDURE — 36415 COLL VENOUS BLD VENIPUNCTURE: CPT

## 2024-01-18 RX ORDER — HEPARIN 100 UNIT/ML
500 SYRINGE INTRAVENOUS
Status: CANCELLED | OUTPATIENT
Start: 2024-01-18

## 2024-01-18 RX ORDER — SODIUM CHLORIDE 0.9 % (FLUSH) 0.9 %
10 SYRINGE (ML) INJECTION
Status: CANCELLED | OUTPATIENT
Start: 2024-01-18

## 2024-01-18 NOTE — PROGRESS NOTES
Reason for Follow-up:  Chemotherapy teaching Cisplatin for Squamous Cell Carcinoma of Vulva    Current Treatment:  -cisplatin 40 mg per m2 IV day 1, every 7 days x7 weeks with concurrent radiotherapy  start 11/30/23    Treatment Modificatons:  1/4/24: Week 7 held due to   1/11/24: Week 8 held due to       Treatment History:  Past medical history:  Rasheeda-Parkinson-White syndrome.  Graves disease, treated with radioactive iodine in 1999 or 2000, resulting in iatrogenic hypothyroidism; subsequently, on thyroid replacement therapy.  Hypertension.  Anxiety.  Tobacco abuse.  History of abnormal Pap smears in the past; no regular follow-up.  According to her, history of abnormal Pap smears, HPV 16 positive    Surgical/procedure history:  Hysterectomy for AUB/fibroids at age 37.     Social history:  Single.  Has 3 children.  Lives in [place], [place].  Used to work as a ; does not work anymore.  Has been smoking < half a pack of cigarettes daily for 10-15 years.  Used to drink 3 beers 3 X a week; now, down to or 3 beers once a week.  No illicit drugs.    Family history:  Daughter experienced ovarian cancer at age 31.    Health maintenance:  PCP in [place], [place].  -04/25/2023: Bilateral screening mammogram (comparison:  None):  BI-RADS 1-negative  -no screening colonoscopy ever.    Gyn history:  Pregnancy x3.  Vaginal birth x3.  Hysterectomy for AUB/fibroids at age 37.        History of Present Illness:     Oncologic/Hematologic History:  Oncology History   Squamous cell carcinoma of vulva   10/31/2023 Initial Diagnosis    Squamous cell carcinoma of vulva     10/31/2023 Cancer Staged    Staging form: Vulva, AJCC 8th Edition  - Clinical stage from 10/31/2023: FIGO Stage IIIB, calculated as Stage LINH (cT3, cN2b, cM0)     11/30/2023 -  Chemotherapy    Treatment Summary   Plan Name: OP GYN CISPLATIN WEEKLY  Treatment Goal: Curative  Status: Active  Start Date: 11/30/2023  End Date: 1/11/2024  (Planned)  Provider: Wai Verduzco MD  Chemotherapy: CISplatin (Platinol) 40 mg/m2 = 64 mg in sodium chloride 0.9% 629 mL chemo infusion, 40 mg/m2 = 64 mg, Intravenous, Clinic/HOD 1 time, 5 of 6 cycles  Administration: 64 mg (11/30/2023), 60 mg (12/7/2023), 63 mg (12/14/2023), 63 mg (12/21/2023), 63 mg (12/28/2023)     58-year-old lady, referred by Dr. Harjinder Dumont, gyn onc, [facility], [place], [place], with squamous cell carcinoma of vulva.    Interval History 1/18/2024: Patient presents to clinic alone to review labs and determine if she is able to receive last cycle of cisplatin tomorrow.  Lab work reviewed with patient, today ANC 1210, still not above 1500 as recommended her cisplatin parameters.  We discussed plan of care and follow-up with patient.      Review of Systems:   Review of Systems   Neurological:  Positive for weakness.   All other systems reviewed and are negative.      Physical Exam  Constitutional:       Appearance: Normal appearance.   HENT:      Head: Normocephalic.      Mouth/Throat:      Mouth: Mucous membranes are moist.   Eyes:      Conjunctiva/sclera: Conjunctivae normal.      Pupils: Pupils are equal, round, and reactive to light.   Cardiovascular:      Rate and Rhythm: Normal rate and regular rhythm.   Pulmonary:      Breath sounds: Normal breath sounds.   Abdominal:      General: Bowel sounds are normal.      Palpations: Abdomen is soft.   Musculoskeletal:      Cervical back: Normal range of motion.   Skin:     General: Skin is warm and dry.   Neurological:      Mental Status: She is alert and oriented to person, place, and time.   Psychiatric:         Mood and Affect: Mood normal.     Vitals:    01/18/24 0947   BP: 124/83   Pulse: 75   Resp: 20   Temp: 98.1 °F (36.7 °C)     Lab Results   Component Value Date    WBC 2.11 (L) 01/18/2024    RBC 2.44 (L) 01/18/2024    HGB 7.6 (L) 01/18/2024    HCT 22.5 (L) 01/18/2024    MCV 92.2 01/18/2024    MCH 31.1 (H)  01/18/2024    MCHC 33.8 01/18/2024    RDW 18.1 (H) 01/18/2024     01/18/2024    MPV 9.0 01/18/2024    EOS 0.2 02/23/2023    BASO 0.1 02/23/2023    EOSINOPHIL 4 02/23/2023   CMP  Sodium   Date Value Ref Range Status   09/18/2021 138 136 - 145 mmol/L Final     Sodium Level   Date Value Ref Range Status   01/18/2024 135 (L) 136 - 145 mmol/L Final     Potassium   Date Value Ref Range Status   08/24/2023 5.1 3.5 - 5.2 mmol/L Final     Potassium Level   Date Value Ref Range Status   01/18/2024 4.2 3.5 - 5.1 mmol/L Final     Chloride   Date Value Ref Range Status   09/18/2021 103 100 - 109 mmol/L Final     Carbon Dioxide   Date Value Ref Range Status   01/18/2024 24 22 - 29 mmol/L Final   09/18/2021 25 22 - 33 mmol/L Final     Blood Urea Nitrogen   Date Value Ref Range Status   01/18/2024 7.3 (L) 9.8 - 20.1 mg/dL Final   09/18/2021 15 5 - 25 mg/dL Final     Creatinine   Date Value Ref Range Status   01/18/2024 0.98 0.55 - 1.02 mg/dL Final   09/18/2021 0.96 0.57 - 1.25 mg/dL Final     Calcium   Date Value Ref Range Status   09/18/2021 9.2 8.8 - 10.6 mg/dL Final     Calcium Level Total   Date Value Ref Range Status   01/18/2024 9.1 8.4 - 10.2 mg/dL Final     Albumin Level   Date Value Ref Range Status   01/18/2024 3.1 (L) 3.5 - 5.0 g/dL Final     Bilirubin Total   Date Value Ref Range Status   01/18/2024 0.4 <=1.5 mg/dL Final     Alkaline Phosphatase   Date Value Ref Range Status   01/18/2024 76 40 - 150 unit/L Final     Aspartate Aminotransferase   Date Value Ref Range Status   01/18/2024 9 5 - 34 unit/L Final     Alanine Aminotransferase   Date Value Ref Range Status   01/18/2024 5 0 - 55 unit/L Final     Anion Gap   Date Value Ref Range Status   09/18/2021 10 8 - 16 mmol/L Final     eGFR   Date Value Ref Range Status   01/18/2024 >60 mls/min/1.73/m2 Final     Assessment:  Squamous cell carcinoma of vulva:  -presentation:  Left vulvar painful, tender mass for 1 year before seeking care in 09/2023  -vulvar biopsy  09/21/2023: Squamous cell carcinoma, extending to the deep and peripheral margins  -gyn exam 09/15/2023:  Friable, atypical borders, tender, entire left labia replaced by tumor/condylomatous appearance in several areas; left inguinal lymphadenopathy  -Gyn Onc exam 09/29/2023:  Very large vulvar mass involving the mid and posterior left labial structures with areas of ulceration, etc.; no fixation; distal vagina involved; involve the left anterior portion of the anus as well; no extension into deeper tissue; 8 cm x 5 cm;  -MRI pelvis with and without contrast 10/20/2023:  Left vulvar mass involving the left perineum and lower 2/3 of the vagina; 8.0 x 2.5 x 5.4 cm; no urethral invasion; abutment of the anterior rectal wall is indeterminate for invasion; left inguinal lymphadenopathy (2 abnormal lymph nodes, largest 2.0 cm; a 3rd lymph node similar appearance nonenlarged)  -PET-CT 10/31/2023:  No distant metastases; no intrapelvic lymphadenopathy; 2.4 cm hypermetabolic left inguinal lymph node; additionally, hypermetabolic lymph node adjacent to it as well; primary tumor, hypermetabolic, involving a portion of the vaginal canal)  To summarize:  -squamous cell carcinoma of the vulva (left labia)  -disease involving lower 2/3 of the vagina on MRI  -left inguinal lymphadenopathy (regional lymphadenopathy), 2.0 cm on MRI pelvis, 2.4 cm on PET-CT (> 5 mm), therefore, FIGO stage IIIB  -11/01/2023: CBC normal; hemoglobin 13.7; CMP unremarkable; HIV nonreactive  -11/14/2023: Message from Dr. Harjinder Maynard's office:  Recommendation to proceed with chemoradiation therapy; if persistent disease after treatment, then, will plan surgical resection      Father experienced prostate cancer at age 70   Daughter experienced ovarian cancer at age 31    History of hysterectomy at age 37 for AUB/uterine fibroids  History of Graves disease, treated; subsequently, hypothyroid  History of tobacco abuse         Plan:  Squamous cell carcinoma of  vulva  -squamous cell carcinoma of the vulva (left labia)  -disease involving lower 2/3 of the vagina on MRI  -left inguinal lymphadenopathy (regional lymphadenopathy), 2.0 cm on MRI pelvis, 2.4 cm on PET-CT (> 5 mm), therefore, FIGO stage IIIB  -locally advanced disease; unresectable  -radiologically suspicious nodes (left inguinal lymphadenopathy on MRI and every PET-CT)    Primary treatment:   1. Inguinofemoral lymphadenectomy; if positive lymph nodes, then, EBRT +concurrent chemotherapy to primary tumor/inguinofemoral lymph node/pelvic lymph nodes  2. Inguinofemoral lymphadenectomy; if negative lymph node, then, EBRT +concurrent chemotherapy to primary tumor (+/- selective inguinofemoral lymph node coverage)  3. If inguinofemoral lymphadenectomy not performed then:  Consider FNA for enlarged lymph nodes, followed by EBRT +concurrent chemotherapy to primary tumor/inguinofemoral lymph nodes/pelvic lymph nodes    >>>  -11/01/2023: Message from Dr. Harjinder Dumont:  He wishes to perform inguinofemoral lymphadenectomy  -therefore, we will defer chemoradiation therapy until after she heals up from surgery  -11/14/2023: Message from Dr. Harjinder Maynard's office:  Recommendation to proceed with chemoradiation therapy; if persistent disease after treatment, then, will plan surgical resection  >>>    Followed by evaluation of response to EBRT +concurrent chemotherapy:   Consider FDG PET-CT at 3-6 months to assess treatment response after definitive primary treatment  1. If clinically negative for residual tumor at primary site and nodes, then: Surveillance  2. If clinically negative for residual tumor at primary site and nodes, then: Consider biopsy of tumor bed to confirm pathologic complete response (no sooner than 3 months after completion of treatment); if biopsy negative, then, surveillance; if biopsy positive, then, resection, followed by surveillance for negative margins, and consideration of additional surgery  (consider pelvic exenteration for selected cases with a central recurrence), additional EBRT and/or systemic therapy for positive margins for invasive disease   3. If clinically positive for residual tumor at primary site and/or nodes, then:  -resection, followed by surveillance for negative margins  -resection, followed by additional surgery/additional EBRT, N/or systemic therapy for positive margins for invasive disease  -if unresectable, then, consider additional EBRT and/or systemic therapy or best supportive care      Surveillance (after completion of treatment):  1. Interval history and physical every 3-6 months for 2 years, then every 6-12 months for 3-5 years, then annually based on patient's risk of disease recurrence  2. Cervical/vaginal cytology screening for detection of lower genital tract neoplasia (may include HPV testing)  3. Imaging as indicated based on symptoms or examination findings suspicious for recurrence  4. Laboratory assessment as indicated based on symptoms or examination findings suspicious for recurrence  5. Patient Education regarding symptoms of potential recurrence and vulvar dystrophy, periodic self examinations, lifestyle, obesity, exercise, 6 health (including vaginal dilator use and lubricant/moisturizers), smoking cessation, nutrition counseling, and potential long-term and late effects of treatment     Preferred chemotherapy for chemoradiation therapy:  -cisplatin  -carboplatin if patient cisplatin intolerant  -other recommended regimens: Cisplatin/5 FU; capecitabine/mitomycin; gemcitabine; paclitaxel    -proceed with EBRT +concurrent chemotherapy to primary tumor/inguinofemoral lymph node/pelvic lymph nodes  -Preferred chemotherapy:  Cisplatin   -cisplatin 40 mg per m2 IV day 1, every 7 days x7 weeks with concurrent radiotherapy    -Cisplatin x2 week due to Neutropenia therefore;  ANC 1210  Okay to proceed with Cisplatin last cycle tomorrow at 20% dose reduction  -Okay to  discontinue Picc line after treatment complete tomorrow  -Hgb 7.6 and dropping, patient asymptomatic. Return on  for lab redraw to assess Hgb level and transfusion needs  -Follow up with NP in 2 weeks with lab work (cbc,cmp,mg)   -Check CBC, CMP and magnesium weekly  -Three months after completion of chemoradiation therapy, FDG PET-CT to assess response (do not perform FDG PET-CT before 3 months after completion of chemoradiation therapy)-Mid April   -Genetic testing because daughter experienced ovarian cancer at age 31-Scheduled 2024  --------------------------------    Anorexia  -2023: 20 lb weight loss in last 3 months   Continue Megace 400 mg p.o. q.day    Monitoring:  -monitor for hypersensitivity reactions, neuropathy, ototoxicity, renal toxicity, electrolyte imbalance, etc.  Monitor for hypersensitivity reactions, electrolyte abnormalities (hypomagnesemia, hypokalemia), renal dysfunction, peripheral neuropathy, ototoxicity.    vulvar pain  Continue Norco (prescribed by Radiation Oncology)    Consider HIV testing    Imagin. Initial workup:  Chest x-ray/chest CT without contrast; pelvic MRI to aid in surgical and/or radiation treatment planning; consider neck/chest/abdomen/pelvis/groin FDG PET-CT or CT C/A/P with contrast for T2 or larger tumors or metastases suspected, etc.    Follow-up/surveillance imaging:  CT C/A/P with contrast or neck/chest/abdomen/pelvis/groin FDG PET-CT if recurrence/metastases suspected  Consider FDG PET-CT at 3-6 months to assess treatment response after definitive primary treatment    Imaging for suspected or documented recurrence:   Consider neck/chest/abdomen/pelvis/groin FDG PET-CT if not previously performed during surveillance  Consider pelvic MRI to aid in further treatment planning    History of tobacco abuse  -importance of complete and permanent abstinence discussed and strongly emphasized    History of Graves disease  -treated with radioactive  iodine in 1990 9-2000, resulting in iatrogenic hypothyroidism; subsequently, on thyroid replacement therapy   Follow-up with PCP    Above discussed at length with the patient.  All questions answered.  Plan of management discussed in detail.  Potential side effects of cisplatin discussed.  She understands and agrees with this plan.

## 2024-01-18 NOTE — Clinical Note
infusion tomorrow 1/19 cycle 8 last cycle of cisplatin  lab work only on 1/22/24 possible infusion for blood transfusion

## 2024-01-19 ENCOUNTER — INFUSION (OUTPATIENT)
Dept: INFUSION THERAPY | Facility: HOSPITAL | Age: 59
End: 2024-01-19
Attending: INTERNAL MEDICINE
Payer: MEDICAID

## 2024-01-19 VITALS
SYSTOLIC BLOOD PRESSURE: 125 MMHG | TEMPERATURE: 98 F | DIASTOLIC BLOOD PRESSURE: 66 MMHG | RESPIRATION RATE: 20 BRPM | HEART RATE: 68 BPM | OXYGEN SATURATION: 100 %

## 2024-01-19 DIAGNOSIS — E83.42 HYPOMAGNESEMIA: ICD-10-CM

## 2024-01-19 DIAGNOSIS — C51.9 SQUAMOUS CELL CARCINOMA OF VULVA: Primary | ICD-10-CM

## 2024-01-19 DIAGNOSIS — R94.4 DECREASED CREATININE CLEARANCE: ICD-10-CM

## 2024-01-19 PROCEDURE — 96413 CHEMO IV INFUSION 1 HR: CPT

## 2024-01-19 PROCEDURE — 96367 TX/PROPH/DG ADDL SEQ IV INF: CPT

## 2024-01-19 PROCEDURE — 63600175 PHARM REV CODE 636 W HCPCS: Performed by: NURSE PRACTITIONER

## 2024-01-19 PROCEDURE — 25000003 PHARM REV CODE 250: Performed by: NURSE PRACTITIONER

## 2024-01-19 PROCEDURE — 96361 HYDRATE IV INFUSION ADD-ON: CPT

## 2024-01-19 PROCEDURE — 96375 TX/PRO/DX INJ NEW DRUG ADDON: CPT

## 2024-01-19 RX ORDER — SODIUM CHLORIDE 0.9 % (FLUSH) 0.9 %
10 SYRINGE (ML) INJECTION
Status: DISCONTINUED | OUTPATIENT
Start: 2024-01-19 | End: 2024-01-19 | Stop reason: HOSPADM

## 2024-01-19 RX ORDER — HEPARIN 100 UNIT/ML
500 SYRINGE INTRAVENOUS
Status: DISCONTINUED | OUTPATIENT
Start: 2024-01-19 | End: 2024-01-19 | Stop reason: HOSPADM

## 2024-01-19 RX ADMIN — SODIUM CHLORIDE 500 ML: 9 INJECTION, SOLUTION INTRAVENOUS at 10:01

## 2024-01-19 RX ADMIN — POTASSIUM CHLORIDE 500 ML/HR: 2 INJECTION, SOLUTION, CONCENTRATE INTRAVENOUS at 09:01

## 2024-01-19 RX ADMIN — CISPLATIN 50 MG: 1 INJECTION, SOLUTION INTRAVENOUS at 09:01

## 2024-01-19 RX ADMIN — DEXAMETHASONE SODIUM PHOSPHATE 0.25 MG: 4 INJECTION, SOLUTION INTRA-ARTICULAR; INTRALESIONAL; INTRAMUSCULAR; INTRAVENOUS; SOFT TISSUE at 08:01

## 2024-01-19 RX ADMIN — APREPITANT 130 MG: 130 INJECTION, EMULSION INTRAVENOUS at 08:01

## 2024-01-22 ENCOUNTER — LAB VISIT (OUTPATIENT)
Dept: HEMATOLOGY/ONCOLOGY | Facility: CLINIC | Age: 59
End: 2024-01-22
Payer: MEDICAID

## 2024-01-22 ENCOUNTER — INFUSION (OUTPATIENT)
Dept: INFUSION THERAPY | Facility: HOSPITAL | Age: 59
End: 2024-01-22
Attending: INTERNAL MEDICINE
Payer: MEDICAID

## 2024-01-22 DIAGNOSIS — Z90.710 HISTORY OF HYSTERECTOMY: ICD-10-CM

## 2024-01-22 DIAGNOSIS — R59.0 INGUINAL LYMPHADENOPATHY: ICD-10-CM

## 2024-01-22 DIAGNOSIS — C51.9 SQUAMOUS CELL CARCINOMA OF VULVA: ICD-10-CM

## 2024-01-22 LAB
ALBUMIN SERPL-MCNC: 3.2 G/DL (ref 3.5–5)
ALBUMIN/GLOB SERPL: 1 RATIO (ref 1.1–2)
ALP SERPL-CCNC: 73 UNIT/L (ref 40–150)
ALT SERPL-CCNC: 7 UNIT/L (ref 0–55)
AST SERPL-CCNC: 11 UNIT/L (ref 5–34)
BASOPHILS # BLD AUTO: 0.01 X10(3)/MCL
BASOPHILS NFR BLD AUTO: 0.2 %
BILIRUB SERPL-MCNC: 0.4 MG/DL
BUN SERPL-MCNC: 17 MG/DL (ref 9.8–20.1)
CALCIUM SERPL-MCNC: 8.9 MG/DL (ref 8.4–10.2)
CHLORIDE SERPL-SCNC: 100 MMOL/L (ref 98–107)
CO2 SERPL-SCNC: 25 MMOL/L (ref 22–29)
CREAT SERPL-MCNC: 1.01 MG/DL (ref 0.55–1.02)
EOSINOPHIL # BLD AUTO: 0.02 X10(3)/MCL (ref 0–0.9)
EOSINOPHIL NFR BLD AUTO: 0.4 %
ERYTHROCYTE [DISTWIDTH] IN BLOOD BY AUTOMATED COUNT: 19.3 % (ref 11.5–17)
GFR SERPLBLD CREATININE-BSD FMLA CKD-EPI: >60 MLS/MIN/1.73/M2
GLOBULIN SER-MCNC: 3.1 GM/DL (ref 2.4–3.5)
GLUCOSE SERPL-MCNC: 107 MG/DL (ref 74–100)
HCT VFR BLD AUTO: 24 % (ref 37–47)
HGB BLD-MCNC: 7.9 G/DL (ref 12–16)
IMM GRANULOCYTES # BLD AUTO: 0.08 X10(3)/MCL (ref 0–0.04)
IMM GRANULOCYTES NFR BLD AUTO: 1.7 %
LYMPHOCYTES # BLD AUTO: 0.14 X10(3)/MCL (ref 0.6–4.6)
LYMPHOCYTES NFR BLD AUTO: 3 %
MAGNESIUM SERPL-MCNC: 1.9 MG/DL (ref 1.6–2.6)
MCH RBC QN AUTO: 30.6 PG (ref 27–31)
MCHC RBC AUTO-ENTMCNC: 32.9 G/DL (ref 33–36)
MCV RBC AUTO: 93 FL (ref 80–94)
MONOCYTES # BLD AUTO: 0.59 X10(3)/MCL (ref 0.1–1.3)
MONOCYTES NFR BLD AUTO: 12.6 %
NEUTROPHILS # BLD AUTO: 3.83 X10(3)/MCL (ref 2.1–9.2)
NEUTROPHILS NFR BLD AUTO: 82.1 %
NRBC BLD AUTO-RTO: 0 %
PLATELET # BLD AUTO: 313 X10(3)/MCL (ref 130–400)
PMV BLD AUTO: 9 FL (ref 7.4–10.4)
POTASSIUM SERPL-SCNC: 3.9 MMOL/L (ref 3.5–5.1)
PROT SERPL-MCNC: 6.3 GM/DL (ref 6.4–8.3)
RBC # BLD AUTO: 2.58 X10(6)/MCL (ref 4.2–5.4)
SODIUM SERPL-SCNC: 133 MMOL/L (ref 136–145)
WBC # SPEC AUTO: 4.67 X10(3)/MCL (ref 4.5–11.5)

## 2024-01-22 PROCEDURE — 36415 COLL VENOUS BLD VENIPUNCTURE: CPT

## 2024-01-22 PROCEDURE — 80053 COMPREHEN METABOLIC PANEL: CPT

## 2024-01-22 PROCEDURE — 83735 ASSAY OF MAGNESIUM: CPT

## 2024-01-22 PROCEDURE — 85025 COMPLETE CBC W/AUTO DIFF WBC: CPT

## 2024-01-22 NOTE — NURSING
0910  Pt did repeat labs.  Her H/H on Friday was 7.6/22.5 and increased to 7.9/24.  Pt states she feels fine.  Notified A Peter HAYES of lab results and she stated that was fine, she just wanted to make sure labs did not drop.  Pt completes radiation this week then will see a provider post radiation.

## 2024-01-31 PROBLEM — Z87.448 PERSONAL HISTORY OF OTHER DISORDER OF URINARY SYSTEM: Status: ACTIVE | Noted: 2024-01-31

## 2024-02-01 ENCOUNTER — DOCUMENTATION ONLY (OUTPATIENT)
Dept: HEMATOLOGY/ONCOLOGY | Facility: CLINIC | Age: 59
End: 2024-02-01

## 2024-02-01 ENCOUNTER — INFUSION (OUTPATIENT)
Dept: INFUSION THERAPY | Facility: HOSPITAL | Age: 59
End: 2024-02-01
Attending: INTERNAL MEDICINE
Payer: MEDICAID

## 2024-02-01 ENCOUNTER — OFFICE VISIT (OUTPATIENT)
Dept: HEMATOLOGY/ONCOLOGY | Facility: CLINIC | Age: 59
End: 2024-02-01
Payer: MEDICAID

## 2024-02-01 ENCOUNTER — LAB VISIT (OUTPATIENT)
Dept: HEMATOLOGY/ONCOLOGY | Facility: CLINIC | Age: 59
End: 2024-02-01
Payer: MEDICAID

## 2024-02-01 VITALS
RESPIRATION RATE: 17 BRPM | TEMPERATURE: 98 F | SYSTOLIC BLOOD PRESSURE: 120 MMHG | DIASTOLIC BLOOD PRESSURE: 67 MMHG | WEIGHT: 124.63 LBS | OXYGEN SATURATION: 100 % | HEIGHT: 63 IN | HEART RATE: 71 BPM | BODY MASS INDEX: 22.08 KG/M2

## 2024-02-01 VITALS
SYSTOLIC BLOOD PRESSURE: 111 MMHG | TEMPERATURE: 98 F | HEART RATE: 63 BPM | DIASTOLIC BLOOD PRESSURE: 68 MMHG | RESPIRATION RATE: 18 BRPM

## 2024-02-01 DIAGNOSIS — C51.9 SQUAMOUS CELL CARCINOMA OF VULVA: ICD-10-CM

## 2024-02-01 DIAGNOSIS — D63.8 ANEMIA OF CHRONIC DISEASE: Primary | ICD-10-CM

## 2024-02-01 DIAGNOSIS — D63.8 ANEMIA OF CHRONIC DISEASE: ICD-10-CM

## 2024-02-01 DIAGNOSIS — Z90.710 HISTORY OF HYSTERECTOMY: ICD-10-CM

## 2024-02-01 DIAGNOSIS — Z87.448: ICD-10-CM

## 2024-02-01 DIAGNOSIS — C51.9 SQUAMOUS CELL CARCINOMA OF VULVA: Primary | ICD-10-CM

## 2024-02-01 DIAGNOSIS — R10.2 PAIN IN VULVA: ICD-10-CM

## 2024-02-01 DIAGNOSIS — R63.4 UNINTENTIONAL WEIGHT LOSS: ICD-10-CM

## 2024-02-01 DIAGNOSIS — D70.1 CHEMOTHERAPY-INDUCED NEUTROPENIA: ICD-10-CM

## 2024-02-01 DIAGNOSIS — T45.1X5A CHEMOTHERAPY-INDUCED NEUTROPENIA: ICD-10-CM

## 2024-02-01 DIAGNOSIS — R59.0 INGUINAL LYMPHADENOPATHY: ICD-10-CM

## 2024-02-01 DIAGNOSIS — Z86.39 HISTORY OF GRAVES' DISEASE: ICD-10-CM

## 2024-02-01 LAB
ABO + RH BLD: NORMAL
ABORH RETYPE: NORMAL
ALBUMIN SERPL-MCNC: 2.9 G/DL (ref 3.5–5)
ALBUMIN/GLOB SERPL: 0.9 RATIO (ref 1.1–2)
ALP SERPL-CCNC: 73 UNIT/L (ref 40–150)
ALT SERPL-CCNC: 6 UNIT/L (ref 0–55)
AST SERPL-CCNC: 10 UNIT/L (ref 5–34)
BASOPHILS # BLD AUTO: 0.03 X10(3)/MCL
BASOPHILS NFR BLD AUTO: 0.8 %
BILIRUB SERPL-MCNC: 0.2 MG/DL
BLD PROD TYP BPU: NORMAL
BLOOD UNIT EXPIRATION DATE: NORMAL
BLOOD UNIT TYPE CODE: 6200
BUN SERPL-MCNC: 14.6 MG/DL (ref 9.8–20.1)
CALCIUM SERPL-MCNC: 8.7 MG/DL (ref 8.4–10.2)
CHLORIDE SERPL-SCNC: 105 MMOL/L (ref 98–107)
CO2 SERPL-SCNC: 26 MMOL/L (ref 22–29)
CREAT SERPL-MCNC: 1.24 MG/DL (ref 0.55–1.02)
CROSSMATCH INTERPRETATION: NORMAL
DISPENSE STATUS: NORMAL
EOSINOPHIL # BLD AUTO: 0.15 X10(3)/MCL (ref 0–0.9)
EOSINOPHIL NFR BLD AUTO: 4.1 %
ERYTHROCYTE [DISTWIDTH] IN BLOOD BY AUTOMATED COUNT: 21.5 % (ref 11.5–17)
GFR SERPLBLD CREATININE-BSD FMLA CKD-EPI: 51 MLS/MIN/1.73/M2
GLOBULIN SER-MCNC: 3.2 GM/DL (ref 2.4–3.5)
GLUCOSE SERPL-MCNC: 116 MG/DL (ref 74–100)
GROUP & RH: NORMAL
HCT VFR BLD AUTO: 21.1 % (ref 37–47)
HGB BLD-MCNC: 6.8 G/DL (ref 12–16)
IMM GRANULOCYTES # BLD AUTO: 0.08 X10(3)/MCL (ref 0–0.04)
IMM GRANULOCYTES NFR BLD AUTO: 2.2 %
INDIRECT COOMBS: NORMAL
LYMPHOCYTES # BLD AUTO: 0.24 X10(3)/MCL (ref 0.6–4.6)
LYMPHOCYTES NFR BLD AUTO: 6.5 %
MAGNESIUM SERPL-MCNC: 1.8 MG/DL (ref 1.6–2.6)
MCH RBC QN AUTO: 31.6 PG (ref 27–31)
MCHC RBC AUTO-ENTMCNC: 32.2 G/DL (ref 33–36)
MCV RBC AUTO: 98.1 FL (ref 80–94)
MONOCYTES # BLD AUTO: 0.58 X10(3)/MCL (ref 0.1–1.3)
MONOCYTES NFR BLD AUTO: 15.7 %
NEUTROPHILS # BLD AUTO: 2.62 X10(3)/MCL (ref 2.1–9.2)
NEUTROPHILS NFR BLD AUTO: 70.7 %
NRBC BLD AUTO-RTO: 0 %
PLATELET # BLD AUTO: 252 X10(3)/MCL (ref 130–400)
PMV BLD AUTO: 9.1 FL (ref 7.4–10.4)
POTASSIUM SERPL-SCNC: 4.4 MMOL/L (ref 3.5–5.1)
PROT SERPL-MCNC: 6.1 GM/DL (ref 6.4–8.3)
RBC # BLD AUTO: 2.15 X10(6)/MCL (ref 4.2–5.4)
SODIUM SERPL-SCNC: 137 MMOL/L (ref 136–145)
SPECIMEN OUTDATE: NORMAL
UNIT NUMBER: NORMAL
WBC # SPEC AUTO: 3.7 X10(3)/MCL (ref 4.5–11.5)

## 2024-02-01 PROCEDURE — 36415 COLL VENOUS BLD VENIPUNCTURE: CPT

## 2024-02-01 PROCEDURE — 3074F SYST BP LT 130 MM HG: CPT | Mod: CPTII,,, | Performed by: NURSE PRACTITIONER

## 2024-02-01 PROCEDURE — 25000003 PHARM REV CODE 250: Performed by: NURSE PRACTITIONER

## 2024-02-01 PROCEDURE — 83735 ASSAY OF MAGNESIUM: CPT

## 2024-02-01 PROCEDURE — 36430 TRANSFUSION BLD/BLD COMPNT: CPT

## 2024-02-01 PROCEDURE — P9016 RBC LEUKOCYTES REDUCED: HCPCS | Performed by: NURSE PRACTITIONER

## 2024-02-01 PROCEDURE — 99215 OFFICE O/P EST HI 40 MIN: CPT | Mod: S$PBB,,, | Performed by: NURSE PRACTITIONER

## 2024-02-01 PROCEDURE — 86901 BLOOD TYPING SEROLOGIC RH(D): CPT | Performed by: NURSE PRACTITIONER

## 2024-02-01 PROCEDURE — 86900 BLOOD TYPING SEROLOGIC ABO: CPT | Performed by: NURSE PRACTITIONER

## 2024-02-01 PROCEDURE — 63600175 PHARM REV CODE 636 W HCPCS: Performed by: NURSE PRACTITIONER

## 2024-02-01 PROCEDURE — 86923 COMPATIBILITY TEST ELECTRIC: CPT | Performed by: NURSE PRACTITIONER

## 2024-02-01 PROCEDURE — 86850 RBC ANTIBODY SCREEN: CPT | Performed by: NURSE PRACTITIONER

## 2024-02-01 PROCEDURE — 3008F BODY MASS INDEX DOCD: CPT | Mod: CPTII,,, | Performed by: NURSE PRACTITIONER

## 2024-02-01 PROCEDURE — 85025 COMPLETE CBC W/AUTO DIFF WBC: CPT

## 2024-02-01 PROCEDURE — 3078F DIAST BP <80 MM HG: CPT | Mod: CPTII,,, | Performed by: NURSE PRACTITIONER

## 2024-02-01 PROCEDURE — 80053 COMPREHEN METABOLIC PANEL: CPT

## 2024-02-01 PROCEDURE — 99215 OFFICE O/P EST HI 40 MIN: CPT | Mod: PBBFAC | Performed by: NURSE PRACTITIONER

## 2024-02-01 PROCEDURE — 96374 THER/PROPH/DIAG INJ IV PUSH: CPT

## 2024-02-01 PROCEDURE — 86850 RBC ANTIBODY SCREEN: CPT | Mod: 91 | Performed by: NURSE PRACTITIONER

## 2024-02-01 RX ORDER — ACETAMINOPHEN 325 MG/1
650 TABLET ORAL
Status: COMPLETED | OUTPATIENT
Start: 2024-02-01 | End: 2024-02-01

## 2024-02-01 RX ORDER — HYDROCODONE BITARTRATE AND ACETAMINOPHEN 500; 5 MG/1; MG/1
TABLET ORAL ONCE
Status: COMPLETED | OUTPATIENT
Start: 2024-02-01 | End: 2024-02-01

## 2024-02-01 RX ORDER — DIPHENHYDRAMINE HYDROCHLORIDE 50 MG/ML
25 INJECTION INTRAMUSCULAR; INTRAVENOUS
Status: COMPLETED | OUTPATIENT
Start: 2024-02-01 | End: 2024-02-01

## 2024-02-01 RX ORDER — FUROSEMIDE 10 MG/ML
20 INJECTION INTRAMUSCULAR; INTRAVENOUS
Status: COMPLETED | OUTPATIENT
Start: 2024-02-01 | End: 2024-02-01

## 2024-02-01 RX ORDER — HYDROCODONE BITARTRATE AND ACETAMINOPHEN 500; 5 MG/1; MG/1
TABLET ORAL ONCE
Status: CANCELLED | OUTPATIENT
Start: 2024-02-01 | End: 2024-02-01

## 2024-02-01 RX ORDER — ACETAMINOPHEN 325 MG/1
650 TABLET ORAL
Status: CANCELLED | OUTPATIENT
Start: 2024-02-01

## 2024-02-01 RX ORDER — DIPHENHYDRAMINE HYDROCHLORIDE 50 MG/ML
25 INJECTION INTRAMUSCULAR; INTRAVENOUS
Status: CANCELLED | OUTPATIENT
Start: 2024-02-01

## 2024-02-01 RX ORDER — FUROSEMIDE 10 MG/ML
20 INJECTION INTRAMUSCULAR; INTRAVENOUS
Status: CANCELLED | OUTPATIENT
Start: 2024-02-01

## 2024-02-01 RX ADMIN — SODIUM CHLORIDE: 9 INJECTION, SOLUTION INTRAVENOUS at 01:02

## 2024-02-01 RX ADMIN — DIPHENHYDRAMINE HYDROCHLORIDE 25 MG: 50 INJECTION INTRAMUSCULAR; INTRAVENOUS at 11:02

## 2024-02-01 RX ADMIN — FUROSEMIDE 20 MG: 10 INJECTION, SOLUTION INTRAMUSCULAR; INTRAVENOUS at 01:02

## 2024-02-01 RX ADMIN — ACETAMINOPHEN 650 MG: 325 TABLET, FILM COATED ORAL at 11:02

## 2024-02-01 NOTE — NURSING
1000 Patient is here for labs, NP visit. H/H 6.8 & 21.1; new orders for 1 U PRBC transfusion today. Lasix 20mgs IVP given after blood transfusion completed.

## 2024-02-01 NOTE — PROGRESS NOTES
Reason for Follow-up:  Chemotherapy teaching Cisplatin for Squamous Cell Carcinoma of Vulva    Current Treatment:  -cisplatin 40 mg per m2 IV day 1, every 7 days x7 weeks with concurrent radiotherapy  start 11/30/23    Treatment Modificatons:  1/4/24: Week 7 held due to   1/11/24: Week 8 held due to     Treatment History:  Past medical history:  Rasheeda-Parkinson-White syndrome.  Graves disease, treated with radioactive iodine in 1999 or 2000, resulting in iatrogenic hypothyroidism; subsequently, on thyroid replacement therapy.  Hypertension.  Anxiety.  Tobacco abuse.  History of abnormal Pap smears in the past; no regular follow-up.  According to her, history of abnormal Pap smears, HPV 16 positive    Surgical/procedure history:  Hysterectomy for AUB/fibroids at age 37.     Social history:  Single.  Has 3 children.  Lives in [place], [place].  Used to work as a ; does not work anymore.  Has been smoking < half a pack of cigarettes daily for 10-15 years.  Used to drink 3 beers 3 X a week; now, down to or 3 beers once a week.  No illicit drugs.    Family history:  Daughter experienced ovarian cancer at age 31.    Health maintenance:  PCP in [place], [place].  -04/25/2023: Bilateral screening mammogram (comparison:  None):  BI-RADS 1-negative  -no screening colonoscopy ever.    Gyn history:  Pregnancy x3.  Vaginal birth x3.  Hysterectomy for AUB/fibroids at age 37.      History of Present Illness:     Oncologic/Hematologic History:  Oncology History   Squamous cell carcinoma of vulva   10/31/2023 Initial Diagnosis    Squamous cell carcinoma of vulva     10/31/2023 Cancer Staged    Staging form: Vulva, AJCC 8th Edition  - Clinical stage from 10/31/2023: FIGO Stage IIIB, calculated as Stage LINH (cT3, cN2b, cM0)     11/30/2023 -  Chemotherapy    Treatment Summary   Plan Name: OP GYN CISPLATIN WEEKLY  Treatment Goal: Curative  Status: Active  Start Date: 11/30/2023  End Date:  1/19/2024  Provider: Wai Verduzco MD  Chemotherapy: CISplatin (Platinol) 40 mg/m2 = 64 mg in sodium chloride 0.9% 629 mL chemo infusion, 40 mg/m2 = 64 mg, Intravenous, Clinic/HOD 1 time, 6 of 6 cycles  Dose modification: 32 mg/m2 (80 % of original dose 40 mg/m2, Cycle 7, Reason: Treatment Parameters Not Met)  Administration: 64 mg (11/30/2023), 60 mg (12/7/2023), 63 mg (12/14/2023), 63 mg (12/21/2023), 63 mg (12/28/2023)     58-year-old lady, referred by Dr. Harjinder Dumont, gyn onc, [facility], [place], [place], with squamous cell carcinoma of vulva.    Interval History 2/1/2024: Patient presents to clinic along with her  for scheduled follow-up status post completion of chemoradiation.  Patient reports completing radiation last Friday.  She admits since completion of treatment appetite has improved, energy level, and taste buds have returned.  Patient does admit to bilateral lower extremity swelling.  Patient denies any pain, redness the bilateral lower extremities.  She denies any shortness of breath, chest pain or palpitations.  Lab work reviewed with the patient, low H&H 6.8 & 21.1.  Slightly elevated serum creatinine 1.2.  Patient says that she drinks plenty of water throughout the day.  Discussed plan of care and scheduled follow-up.       Review of Systems:   Review of Systems   Cardiovascular:  Positive for leg swelling.   All other systems reviewed and are negative.      Physical Exam  Constitutional:       Appearance: Normal appearance.   HENT:      Head: Normocephalic.      Mouth/Throat:      Mouth: Mucous membranes are moist.   Eyes:      Conjunctiva/sclera: Conjunctivae normal.      Pupils: Pupils are equal, round, and reactive to light.   Cardiovascular:      Rate and Rhythm: Normal rate and regular rhythm.   Pulmonary:      Breath sounds: Normal breath sounds.   Abdominal:      General: Bowel sounds are normal.      Palpations: Abdomen is soft.    Musculoskeletal:      Cervical back: Normal range of motion.      Right lower leg: Edema present.      Left lower leg: Edema present.   Skin:     General: Skin is warm and dry.   Neurological:      Mental Status: She is alert and oriented to person, place, and time.   Psychiatric:         Mood and Affect: Mood normal.     Vitals:    02/01/24 0910   BP: 120/67   Pulse: 71   Resp: 17   Temp: 97.9 °F (36.6 °C)     Lab Results   Component Value Date    WBC 3.70 (L) 02/01/2024    RBC 2.15 (L) 02/01/2024    HGB 6.8 (L) 02/01/2024    HCT 21.1 (L) 02/01/2024    MCV 98.1 (H) 02/01/2024    MCH 31.6 (H) 02/01/2024    MCHC 32.2 (L) 02/01/2024    RDW 21.5 (H) 02/01/2024     02/01/2024    MPV 9.1 02/01/2024    EOS 0.2 02/23/2023    BASO 0.1 02/23/2023    EOSINOPHIL 4 02/23/2023     CMP  Sodium   Date Value Ref Range Status   09/18/2021 138 136 - 145 mmol/L Final     Sodium Level   Date Value Ref Range Status   02/01/2024 137 136 - 145 mmol/L Final     Potassium   Date Value Ref Range Status   08/24/2023 5.1 3.5 - 5.2 mmol/L Final     Potassium Level   Date Value Ref Range Status   02/01/2024 4.4 3.5 - 5.1 mmol/L Final     Chloride   Date Value Ref Range Status   09/18/2021 103 100 - 109 mmol/L Final     Carbon Dioxide   Date Value Ref Range Status   02/01/2024 26 22 - 29 mmol/L Final   09/18/2021 25 22 - 33 mmol/L Final     Blood Urea Nitrogen   Date Value Ref Range Status   02/01/2024 14.6 9.8 - 20.1 mg/dL Final   09/18/2021 15 5 - 25 mg/dL Final     Creatinine   Date Value Ref Range Status   02/01/2024 1.24 (H) 0.55 - 1.02 mg/dL Final   09/18/2021 0.96 0.57 - 1.25 mg/dL Final     Calcium   Date Value Ref Range Status   09/18/2021 9.2 8.8 - 10.6 mg/dL Final     Calcium Level Total   Date Value Ref Range Status   02/01/2024 8.7 8.4 - 10.2 mg/dL Final     Albumin Level   Date Value Ref Range Status   02/01/2024 2.9 (L) 3.5 - 5.0 g/dL Final     Bilirubin Total   Date Value Ref Range Status   02/01/2024 0.2 <=1.5 mg/dL  Final     Alkaline Phosphatase   Date Value Ref Range Status   02/01/2024 73 40 - 150 unit/L Final     Aspartate Aminotransferase   Date Value Ref Range Status   02/01/2024 10 5 - 34 unit/L Final     Alanine Aminotransferase   Date Value Ref Range Status   02/01/2024 6 0 - 55 unit/L Final     Anion Gap   Date Value Ref Range Status   09/18/2021 10 8 - 16 mmol/L Final     eGFR   Date Value Ref Range Status   02/01/2024 51 mls/min/1.73/m2 Final         Assessment:  Squamous cell carcinoma of vulva:  -presentation:  Left vulvar painful, tender mass for 1 year before seeking care in 09/2023  -vulvar biopsy 09/21/2023: Squamous cell carcinoma, extending to the deep and peripheral margins  -gyn exam 09/15/2023:  Friable, atypical borders, tender, entire left labia replaced by tumor/condylomatous appearance in several areas; left inguinal lymphadenopathy  -Gyn Onc exam 09/29/2023:  Very large vulvar mass involving the mid and posterior left labial structures with areas of ulceration, etc.; no fixation; distal vagina involved; involve the left anterior portion of the anus as well; no extension into deeper tissue; 8 cm x 5 cm;  -MRI pelvis with and without contrast 10/20/2023:  Left vulvar mass involving the left perineum and lower 2/3 of the vagina; 8.0 x 2.5 x 5.4 cm; no urethral invasion; abutment of the anterior rectal wall is indeterminate for invasion; left inguinal lymphadenopathy (2 abnormal lymph nodes, largest 2.0 cm; a 3rd lymph node similar appearance nonenlarged)  -PET-CT 10/31/2023:  No distant metastases; no intrapelvic lymphadenopathy; 2.4 cm hypermetabolic left inguinal lymph node; additionally, hypermetabolic lymph node adjacent to it as well; primary tumor, hypermetabolic, involving a portion of the vaginal canal)  To summarize:  -squamous cell carcinoma of the vulva (left labia)  -disease involving lower 2/3 of the vagina on MRI  -left inguinal lymphadenopathy (regional lymphadenopathy), 2.0 cm on MRI  pelvis, 2.4 cm on PET-CT (> 5 mm), therefore, FIGO stage IIIB  -11/01/2023: CBC normal; hemoglobin 13.7; CMP unremarkable; HIV nonreactive  -11/14/2023: Message from Dr. Harjinder Maynard's office:  Recommendation to proceed with chemoradiation therapy; if persistent disease after treatment, then, will plan surgical resection      Father experienced prostate cancer at age 70   Daughter experienced ovarian cancer at age 31    History of hysterectomy at age 37 for AUB/uterine fibroids  History of Graves disease, treated; subsequently, hypothyroid  History of tobacco abuse       Plan:  Squamous cell carcinoma of vulva  -squamous cell carcinoma of the vulva (left labia)  -disease involving lower 2/3 of the vagina on MRI  -left inguinal lymphadenopathy (regional lymphadenopathy), 2.0 cm on MRI pelvis, 2.4 cm on PET-CT (> 5 mm), therefore, FIGO stage IIIB  -locally advanced disease; unresectable  -radiologically suspicious nodes (left inguinal lymphadenopathy on MRI and every PET-CT)    Primary treatment:   1. Inguinofemoral lymphadenectomy; if positive lymph nodes, then, EBRT +concurrent chemotherapy to primary tumor/inguinofemoral lymph node/pelvic lymph nodes  2. Inguinofemoral lymphadenectomy; if negative lymph node, then, EBRT +concurrent chemotherapy to primary tumor (+/- selective inguinofemoral lymph node coverage)  3. If inguinofemoral lymphadenectomy not performed then:  Consider FNA for enlarged lymph nodes, followed by EBRT +concurrent chemotherapy to primary tumor/inguinofemoral lymph nodes/pelvic lymph nodes    >>>  -11/01/2023: Message from Dr. Harjinder Dumont:  He wishes to perform inguinofemoral lymphadenectomy  -therefore, we will defer chemoradiation therapy until after she heals up from surgery  -11/14/2023: Message from Dr. Harjinder Maynard's office:  Recommendation to proceed with chemoradiation therapy; if persistent disease after treatment, then, will plan surgical resection  >>>    Followed by evaluation of  response to EBRT +concurrent chemotherapy:   Consider FDG PET-CT at 3-6 months to assess treatment response after definitive primary treatment  1. If clinically negative for residual tumor at primary site and nodes, then: Surveillance  2. If clinically negative for residual tumor at primary site and nodes, then: Consider biopsy of tumor bed to confirm pathologic complete response (no sooner than 3 months after completion of treatment); if biopsy negative, then, surveillance; if biopsy positive, then, resection, followed by surveillance for negative margins, and consideration of additional surgery (consider pelvic exenteration for selected cases with a central recurrence), additional EBRT and/or systemic therapy for positive margins for invasive disease   3. If clinically positive for residual tumor at primary site and/or nodes, then:  -resection, followed by surveillance for negative margins  -resection, followed by additional surgery/additional EBRT, N/or systemic therapy for positive margins for invasive disease  -if unresectable, then, consider additional EBRT and/or systemic therapy or best supportive care      Surveillance (after completion of treatment):  1. Interval history and physical every 3-6 months for 2 years, then every 6-12 months for 3-5 years, then annually based on patient's risk of disease recurrence  2. Cervical/vaginal cytology screening for detection of lower genital tract neoplasia (may include HPV testing)  3. Imaging as indicated based on symptoms or examination findings suspicious for recurrence  4. Laboratory assessment as indicated based on symptoms or examination findings suspicious for recurrence  5. Patient Education regarding symptoms of potential recurrence and vulvar dystrophy, periodic self examinations, lifestyle, obesity, exercise, 6 health (including vaginal dilator use and lubricant/moisturizers), smoking cessation, nutrition counseling, and potential long-term and late effects  of treatment     Preferred chemotherapy for chemoradiation therapy:  -cisplatin  -carboplatin if patient cisplatin intolerant  -other recommended regimens: Cisplatin/5 FU; capecitabine/mitomycin; gemcitabine; paclitaxel    -proceed with EBRT +concurrent chemotherapy to primary tumor/inguinofemoral lymph node/pelvic lymph nodes  -Preferred chemotherapy:  Cisplatin   -cisplatin 40 mg per m2 IV day 1, every 7 days x7 weeks with concurrent radiotherapy    -Transfuse x1 unit of PRBC's today   -Give 20mg Lasix IVP post transfusion  -Weekly lab work (cbc,cmp,mg)  -Follow up with NP in 2 weeks with lab work (cbc,cmp)  -Three months after completion of chemoradiation therapy, FDG PET-CT to assess response (do not perform FDG PET-CT before 3 months after completion of chemoradiation therapy)-Mid April-Ordered today  -Genetic testing because daughter experienced ovarian cancer at age 31-Scheduled 2024  --------------------------------    Anorexia  -2023: 20 lb weight loss in last 3 months   Continue Megace 400 mg p.o. q.day    Monitoring:  -monitor for hypersensitivity reactions, neuropathy, ototoxicity, renal toxicity, electrolyte imbalance, etc.  Monitor for hypersensitivity reactions, electrolyte abnormalities (hypomagnesemia, hypokalemia), renal dysfunction, peripheral neuropathy, ototoxicity.    vulvar pain  Continue Norco (prescribed by Radiation Oncology)    Consider HIV testing    Imagin. Initial workup:  Chest x-ray/chest CT without contrast; pelvic MRI to aid in surgical and/or radiation treatment planning; consider neck/chest/abdomen/pelvis/groin FDG PET-CT or CT C/A/P with contrast for T2 or larger tumors or metastases suspected, etc.    Follow-up/surveillance imaging:  CT C/A/P with contrast or neck/chest/abdomen/pelvis/groin FDG PET-CT if recurrence/metastases suspected  Consider FDG PET-CT at 3-6 months to assess treatment response after definitive primary treatment    Imaging for suspected or  documented recurrence:   Consider neck/chest/abdomen/pelvis/groin FDG PET-CT if not previously performed during surveillance  Consider pelvic MRI to aid in further treatment planning    History of tobacco abuse  -importance of complete and permanent abstinence discussed and strongly emphasized    History of Graves disease  -treated with radioactive iodine in 1990 9-2000, resulting in iatrogenic hypothyroidism; subsequently, on thyroid replacement therapy   Follow-up with PCP    Above discussed at length with the patient.  All questions answered.  Plan of management discussed in detail.  Potential side effects of cisplatin discussed.  She understands and agrees with this plan.

## 2024-02-15 ENCOUNTER — APPOINTMENT (OUTPATIENT)
Dept: HEMATOLOGY/ONCOLOGY | Facility: CLINIC | Age: 59
End: 2024-02-15
Payer: MEDICAID

## 2024-02-15 ENCOUNTER — OFFICE VISIT (OUTPATIENT)
Dept: HEMATOLOGY/ONCOLOGY | Facility: CLINIC | Age: 59
End: 2024-02-15
Payer: MEDICAID

## 2024-02-15 VITALS
HEART RATE: 67 BPM | WEIGHT: 117 LBS | BODY MASS INDEX: 21.53 KG/M2 | RESPIRATION RATE: 20 BRPM | DIASTOLIC BLOOD PRESSURE: 81 MMHG | TEMPERATURE: 98 F | HEIGHT: 62 IN | OXYGEN SATURATION: 100 % | SYSTOLIC BLOOD PRESSURE: 146 MMHG

## 2024-02-15 DIAGNOSIS — R10.2 PAIN IN VULVA: ICD-10-CM

## 2024-02-15 DIAGNOSIS — C51.9 SQUAMOUS CELL CARCINOMA OF VULVA: ICD-10-CM

## 2024-02-15 DIAGNOSIS — R59.0 INGUINAL LYMPHADENOPATHY: ICD-10-CM

## 2024-02-15 DIAGNOSIS — Z72.0 TOBACCO ABUSE: ICD-10-CM

## 2024-02-15 DIAGNOSIS — C51.9 SQUAMOUS CELL CARCINOMA OF VULVA: Primary | ICD-10-CM

## 2024-02-15 DIAGNOSIS — Z86.39 HISTORY OF GRAVES' DISEASE: ICD-10-CM

## 2024-02-15 DIAGNOSIS — D63.8 ANEMIA OF CHRONIC DISEASE: ICD-10-CM

## 2024-02-15 DIAGNOSIS — Z90.710 HISTORY OF HYSTERECTOMY: ICD-10-CM

## 2024-02-15 LAB
ALBUMIN SERPL-MCNC: 3.4 G/DL (ref 3.5–5)
ALBUMIN/GLOB SERPL: 0.9 RATIO (ref 1.1–2)
ALP SERPL-CCNC: 75 UNIT/L (ref 40–150)
ALT SERPL-CCNC: 7 UNIT/L (ref 0–55)
AST SERPL-CCNC: 10 UNIT/L (ref 5–34)
BASOPHILS # BLD AUTO: 0.02 X10(3)/MCL
BASOPHILS NFR BLD AUTO: 0.9 %
BILIRUB SERPL-MCNC: 0.4 MG/DL
BUN SERPL-MCNC: 14.4 MG/DL (ref 9.8–20.1)
CALCIUM SERPL-MCNC: 9.2 MG/DL (ref 8.4–10.2)
CHLORIDE SERPL-SCNC: 105 MMOL/L (ref 98–107)
CO2 SERPL-SCNC: 26 MMOL/L (ref 22–29)
CREAT SERPL-MCNC: 1.15 MG/DL (ref 0.55–1.02)
EOSINOPHIL # BLD AUTO: 0.15 X10(3)/MCL (ref 0–0.9)
EOSINOPHIL NFR BLD AUTO: 6.9 %
ERYTHROCYTE [DISTWIDTH] IN BLOOD BY AUTOMATED COUNT: 18 % (ref 11.5–17)
GFR SERPLBLD CREATININE-BSD FMLA CKD-EPI: 55 MLS/MIN/1.73/M2
GLOBULIN SER-MCNC: 3.6 GM/DL (ref 2.4–3.5)
GLUCOSE SERPL-MCNC: 106 MG/DL (ref 74–100)
HCT VFR BLD AUTO: 30.8 % (ref 37–47)
HGB BLD-MCNC: 10.1 G/DL (ref 12–16)
IMM GRANULOCYTES # BLD AUTO: 0.03 X10(3)/MCL (ref 0–0.04)
IMM GRANULOCYTES NFR BLD AUTO: 1.4 %
LYMPHOCYTES # BLD AUTO: 0.37 X10(3)/MCL (ref 0.6–4.6)
LYMPHOCYTES NFR BLD AUTO: 17.1 %
MCH RBC QN AUTO: 32.5 PG (ref 27–31)
MCHC RBC AUTO-ENTMCNC: 32.8 G/DL (ref 33–36)
MCV RBC AUTO: 99 FL (ref 80–94)
MONOCYTES # BLD AUTO: 0.35 X10(3)/MCL (ref 0.1–1.3)
MONOCYTES NFR BLD AUTO: 16.1 %
NEUTROPHILS # BLD AUTO: 1.25 X10(3)/MCL (ref 2.1–9.2)
NEUTROPHILS NFR BLD AUTO: 57.6 %
NRBC BLD AUTO-RTO: 0 %
PLATELET # BLD AUTO: 198 X10(3)/MCL (ref 130–400)
PMV BLD AUTO: 9 FL (ref 7.4–10.4)
POTASSIUM SERPL-SCNC: 4.4 MMOL/L (ref 3.5–5.1)
PROT SERPL-MCNC: 7 GM/DL (ref 6.4–8.3)
RBC # BLD AUTO: 3.11 X10(6)/MCL (ref 4.2–5.4)
SODIUM SERPL-SCNC: 139 MMOL/L (ref 136–145)
WBC # SPEC AUTO: 2.17 X10(3)/MCL (ref 4.5–11.5)

## 2024-02-15 PROCEDURE — 1160F RVW MEDS BY RX/DR IN RCRD: CPT | Mod: CPTII,,, | Performed by: NURSE PRACTITIONER

## 2024-02-15 PROCEDURE — 85025 COMPLETE CBC W/AUTO DIFF WBC: CPT

## 2024-02-15 PROCEDURE — 99214 OFFICE O/P EST MOD 30 MIN: CPT | Mod: S$PBB,,, | Performed by: NURSE PRACTITIONER

## 2024-02-15 PROCEDURE — 3008F BODY MASS INDEX DOCD: CPT | Mod: CPTII,,, | Performed by: NURSE PRACTITIONER

## 2024-02-15 PROCEDURE — 3077F SYST BP >= 140 MM HG: CPT | Mod: CPTII,,, | Performed by: NURSE PRACTITIONER

## 2024-02-15 PROCEDURE — 80053 COMPREHEN METABOLIC PANEL: CPT

## 2024-02-15 PROCEDURE — 3079F DIAST BP 80-89 MM HG: CPT | Mod: CPTII,,, | Performed by: NURSE PRACTITIONER

## 2024-02-15 PROCEDURE — 36415 COLL VENOUS BLD VENIPUNCTURE: CPT

## 2024-02-15 PROCEDURE — 99215 OFFICE O/P EST HI 40 MIN: CPT | Mod: PBBFAC | Performed by: NURSE PRACTITIONER

## 2024-02-15 PROCEDURE — 1159F MED LIST DOCD IN RCRD: CPT | Mod: CPTII,,, | Performed by: NURSE PRACTITIONER

## 2024-02-15 NOTE — PROGRESS NOTES
Reason for Follow-up:  Chemotherapy teaching Cisplatin for Squamous Cell Carcinoma of Vulva    Current Treatment:  -cisplatin 40 mg per m2 IV day 1, every 7 days x7 weeks with concurrent radiotherapy  start 11/30/23    Treatment Modificatons:  1/4/24: Week 7 held due to   1/11/24: Week 8 held due to     Treatment History:  Past medical history:  Rasheeda-Parkinson-White syndrome.  Graves disease, treated with radioactive iodine in 1999 or 2000, resulting in iatrogenic hypothyroidism; subsequently, on thyroid replacement therapy.  Hypertension.  Anxiety.  Tobacco abuse.  History of abnormal Pap smears in the past; no regular follow-up.  According to her, history of abnormal Pap smears, HPV 16 positive    Surgical/procedure history:  Hysterectomy for AUB/fibroids at age 37.     Social history:  Single.  Has 3 children.  Lives in [place], [place].  Used to work as a ; does not work anymore.  Has been smoking < half a pack of cigarettes daily for 10-15 years.  Used to drink 3 beers 3 X a week; now, down to or 3 beers once a week.  No illicit drugs.    Family history:  Daughter experienced ovarian cancer at age 31.    Health maintenance:  PCP in [place], [place].  -04/25/2023: Bilateral screening mammogram (comparison:  None):  BI-RADS 1-negative  -no screening colonoscopy ever.    Gyn history:  Pregnancy x3.  Vaginal birth x3.  Hysterectomy for AUB/fibroids at age 37.      History of Present Illness:     Oncologic/Hematologic History:  Oncology History   Squamous cell carcinoma of vulva   10/31/2023 Initial Diagnosis    Squamous cell carcinoma of vulva     10/31/2023 Cancer Staged    Staging form: Vulva, AJCC 8th Edition  - Clinical stage from 10/31/2023: FIGO Stage IIIB, calculated as Stage LINH (cT3, cN2b, cM0)     11/30/2023 -  Chemotherapy    Treatment Summary   Plan Name: OP GYN CISPLATIN WEEKLY  Treatment Goal: Curative  Status: Active  Start Date: 11/30/2023  End Date:  1/19/2024  Provider: Wai Verduzco MD  Chemotherapy: CISplatin (Platinol) 40 mg/m2 = 64 mg in sodium chloride 0.9% 629 mL chemo infusion, 40 mg/m2 = 64 mg, Intravenous, Clinic/HOD 1 time, 6 of 6 cycles  Dose modification: 32 mg/m2 (80 % of original dose 40 mg/m2, Cycle 7, Reason: Treatment Parameters Not Met)  Administration: 64 mg (11/30/2023), 60 mg (12/7/2023), 63 mg (12/14/2023), 63 mg (12/21/2023), 63 mg (12/28/2023)     58-year-old lady, referred by Dr. Harjinder Dumont, gyn onc, [facility], [place], [place], with squamous cell carcinoma of vulva.    Interval History 2/15/2024: Patient presents to clinic alone  for scheduled follow-up status post completion of chemoradiation.  Patient completed radiation on Monday February 12th. She admits since completion of treatment appetite has improved, energy level, and taste buds have returned.  Patient reports lower extremity swelling has improved since the completion of chemoradiation.  She denies any abdominal pain, vaginal bleeding, anginal blisters or swelling, constipation, or diarrhea.  Lab work reviewed with the patient, stable.  Patient says she does have some pain still in the vaginal area since completion of radiation but no where near the pain that she was originally experiencing when urinating.  Discussed plan of care and scheduled follow-up.       Review of Systems:   Review of Systems   Cardiovascular:  Positive for leg swelling.   All other systems reviewed and are negative.      Physical Exam  Constitutional:       Appearance: Normal appearance.   HENT:      Head: Normocephalic.      Mouth/Throat:      Mouth: Mucous membranes are moist.   Eyes:      Conjunctiva/sclera: Conjunctivae normal.      Pupils: Pupils are equal, round, and reactive to light.   Cardiovascular:      Rate and Rhythm: Normal rate and regular rhythm.   Pulmonary:      Breath sounds: Normal breath sounds.   Abdominal:      General: Bowel sounds are  normal.      Palpations: Abdomen is soft.   Musculoskeletal:      Cervical back: Normal range of motion.   Skin:     General: Skin is warm and dry.   Neurological:      Mental Status: She is alert and oriented to person, place, and time.   Psychiatric:         Mood and Affect: Mood normal.       Vitals:    02/15/24 0901   BP: (!) 146/81   Pulse: 67   Resp: 20   Temp: 98.3 °F (36.8 °C)     Lab Results   Component Value Date    WBC 2.17 (L) 02/15/2024    RBC 3.11 (L) 02/15/2024    HGB 10.1 (L) 02/15/2024    HCT 30.8 (L) 02/15/2024    MCV 99.0 (H) 02/15/2024    MCH 32.5 (H) 02/15/2024    MCHC 32.8 (L) 02/15/2024    RDW 18.0 (H) 02/15/2024     02/15/2024    MPV 9.0 02/15/2024    EOS 0.2 02/23/2023    BASO 0.1 02/23/2023    EOSINOPHIL 4 02/23/2023     CMP  Sodium   Date Value Ref Range Status   09/18/2021 138 136 - 145 mmol/L Final     Sodium Level   Date Value Ref Range Status   02/15/2024 139 136 - 145 mmol/L Final     Potassium   Date Value Ref Range Status   08/24/2023 5.1 3.5 - 5.2 mmol/L Final     Potassium Level   Date Value Ref Range Status   02/15/2024 4.4 3.5 - 5.1 mmol/L Final     Chloride   Date Value Ref Range Status   09/18/2021 103 100 - 109 mmol/L Final     Carbon Dioxide   Date Value Ref Range Status   02/15/2024 26 22 - 29 mmol/L Final   09/18/2021 25 22 - 33 mmol/L Final     Blood Urea Nitrogen   Date Value Ref Range Status   02/15/2024 14.4 9.8 - 20.1 mg/dL Final   09/18/2021 15 5 - 25 mg/dL Final     Creatinine   Date Value Ref Range Status   02/15/2024 1.15 (H) 0.55 - 1.02 mg/dL Final   09/18/2021 0.96 0.57 - 1.25 mg/dL Final     Calcium   Date Value Ref Range Status   09/18/2021 9.2 8.8 - 10.6 mg/dL Final     Calcium Level Total   Date Value Ref Range Status   02/15/2024 9.2 8.4 - 10.2 mg/dL Final     Albumin Level   Date Value Ref Range Status   02/15/2024 3.4 (L) 3.5 - 5.0 g/dL Final     Bilirubin Total   Date Value Ref Range Status   02/15/2024 0.4 <=1.5 mg/dL Final     Alkaline  Phosphatase   Date Value Ref Range Status   02/15/2024 75 40 - 150 unit/L Final     Aspartate Aminotransferase   Date Value Ref Range Status   02/15/2024 10 5 - 34 unit/L Final     Alanine Aminotransferase   Date Value Ref Range Status   02/15/2024 7 0 - 55 unit/L Final     Anion Gap   Date Value Ref Range Status   09/18/2021 10 8 - 16 mmol/L Final     eGFR   Date Value Ref Range Status   02/15/2024 55 mls/min/1.73/m2 Final     Assessment:  Squamous cell carcinoma of vulva:  -presentation:  Left vulvar painful, tender mass for 1 year before seeking care in 09/2023  -vulvar biopsy 09/21/2023: Squamous cell carcinoma, extending to the deep and peripheral margins  -gyn exam 09/15/2023:  Friable, atypical borders, tender, entire left labia replaced by tumor/condylomatous appearance in several areas; left inguinal lymphadenopathy  -Gyn Onc exam 09/29/2023:  Very large vulvar mass involving the mid and posterior left labial structures with areas of ulceration, etc.; no fixation; distal vagina involved; involve the left anterior portion of the anus as well; no extension into deeper tissue; 8 cm x 5 cm;  -MRI pelvis with and without contrast 10/20/2023:  Left vulvar mass involving the left perineum and lower 2/3 of the vagina; 8.0 x 2.5 x 5.4 cm; no urethral invasion; abutment of the anterior rectal wall is indeterminate for invasion; left inguinal lymphadenopathy (2 abnormal lymph nodes, largest 2.0 cm; a 3rd lymph node similar appearance nonenlarged)  -PET-CT 10/31/2023:  No distant metastases; no intrapelvic lymphadenopathy; 2.4 cm hypermetabolic left inguinal lymph node; additionally, hypermetabolic lymph node adjacent to it as well; primary tumor, hypermetabolic, involving a portion of the vaginal canal)  To summarize:  -squamous cell carcinoma of the vulva (left labia)  -disease involving lower 2/3 of the vagina on MRI  -left inguinal lymphadenopathy (regional lymphadenopathy), 2.0 cm on MRI pelvis, 2.4 cm on PET-CT  (> 5 mm), therefore, FIGO stage IIIB  -11/01/2023: CBC normal; hemoglobin 13.7; CMP unremarkable; HIV nonreactive  -11/14/2023: Message from Dr. Harjinder Maynard's office:  Recommendation to proceed with chemoradiation therapy; if persistent disease after treatment, then, will plan surgical resection      Father experienced prostate cancer at age 70   Daughter experienced ovarian cancer at age 31    History of hysterectomy at age 37 for AUB/uterine fibroids  History of Graves disease, treated; subsequently, hypothyroid  History of tobacco abuse       Plan:  Squamous cell carcinoma of vulva  -squamous cell carcinoma of the vulva (left labia)  -disease involving lower 2/3 of the vagina on MRI  -left inguinal lymphadenopathy (regional lymphadenopathy), 2.0 cm on MRI pelvis, 2.4 cm on PET-CT (> 5 mm), therefore, FIGO stage IIIB  -locally advanced disease; unresectable  -radiologically suspicious nodes (left inguinal lymphadenopathy on MRI and every PET-CT)    Primary treatment:   1. Inguinofemoral lymphadenectomy; if positive lymph nodes, then, EBRT +concurrent chemotherapy to primary tumor/inguinofemoral lymph node/pelvic lymph nodes  2. Inguinofemoral lymphadenectomy; if negative lymph node, then, EBRT +concurrent chemotherapy to primary tumor (+/- selective inguinofemoral lymph node coverage)  3. If inguinofemoral lymphadenectomy not performed then:  Consider FNA for enlarged lymph nodes, followed by EBRT +concurrent chemotherapy to primary tumor/inguinofemoral lymph nodes/pelvic lymph nodes    >>>  -11/01/2023: Message from Dr. Harjinder Dumont:  He wishes to perform inguinofemoral lymphadenectomy  -therefore, we will defer chemoradiation therapy until after she heals up from surgery  -11/14/2023: Message from Dr. Harjinder Maynard's office:  Recommendation to proceed with chemoradiation therapy; if persistent disease after treatment, then, will plan surgical resection  >>>    Followed by evaluation of response to EBRT  +concurrent chemotherapy:   Consider FDG PET-CT at 3-6 months to assess treatment response after definitive primary treatment  1. If clinically negative for residual tumor at primary site and nodes, then: Surveillance  2. If clinically negative for residual tumor at primary site and nodes, then: Consider biopsy of tumor bed to confirm pathologic complete response (no sooner than 3 months after completion of treatment); if biopsy negative, then, surveillance; if biopsy positive, then, resection, followed by surveillance for negative margins, and consideration of additional surgery (consider pelvic exenteration for selected cases with a central recurrence), additional EBRT and/or systemic therapy for positive margins for invasive disease   3. If clinically positive for residual tumor at primary site and/or nodes, then:  -resection, followed by surveillance for negative margins  -resection, followed by additional surgery/additional EBRT, N/or systemic therapy for positive margins for invasive disease  -if unresectable, then, consider additional EBRT and/or systemic therapy or best supportive care      Surveillance (after completion of treatment):  1. Interval history and physical every 3-6 months for 2 years, then every 6-12 months for 3-5 years, then annually based on patient's risk of disease recurrence  2. Cervical/vaginal cytology screening for detection of lower genital tract neoplasia (may include HPV testing)  3. Imaging as indicated based on symptoms or examination findings suspicious for recurrence  4. Laboratory assessment as indicated based on symptoms or examination findings suspicious for recurrence  5. Patient Education regarding symptoms of potential recurrence and vulvar dystrophy, periodic self examinations, lifestyle, obesity, exercise, 6 health (including vaginal dilator use and lubricant/moisturizers), smoking cessation, nutrition counseling, and potential long-term and late effects of treatment      Preferred chemotherapy for chemoradiation therapy:  -cisplatin  -carboplatin if patient cisplatin intolerant  -other recommended regimens: Cisplatin/5 FU; capecitabine/mitomycin; gemcitabine; paclitaxel    -proceed with EBRT +concurrent chemotherapy to primary tumor/inguinofemoral lymph node/pelvic lymph nodes  -Preferred chemotherapy:  Cisplatin   -cisplatin 40 mg per m2 IV day 1, every 7 days x7 weeks with concurrent radiotherapy    -follow-up with NP in 3 weeks with lab work (CBC, CMP)  -Weekly lab work (cbc,cmp,mg)  -Three months after completion of chemoradiation therapy, FDG PET-CT to assess response (do not perform FDG PET-CT before 3 months after completion of chemoradiation therapy)-Mid April-Scheduled 2024  -Genetic testing because daughter experienced ovarian cancer at age 31-Scheduled 2024  --------------------------------      Monitoring:  -monitor for hypersensitivity reactions, neuropathy, ototoxicity, renal toxicity, electrolyte imbalance, etc.  Monitor for hypersensitivity reactions, electrolyte abnormalities (hypomagnesemia, hypokalemia), renal dysfunction, peripheral neuropathy, ototoxicity.    vulvar pain  Continue Norco (prescribed by Radiation Oncology)  as of today 02/15/2024 patient says that she has not had to use any of the Norco    Consider HIV testing    Imagin. Initial workup:  Chest x-ray/chest CT without contrast; pelvic MRI to aid in surgical and/or radiation treatment planning; consider neck/chest/abdomen/pelvis/groin FDG PET-CT or CT C/A/P with contrast for T2 or larger tumors or metastases suspected, etc.    Follow-up/surveillance imaging:  CT C/A/P with contrast or neck/chest/abdomen/pelvis/groin FDG PET-CT if recurrence/metastases suspected  Consider FDG PET-CT at 3-6 months to assess treatment response after definitive primary treatment    Imaging for suspected or documented recurrence:   Consider neck/chest/abdomen/pelvis/groin FDG PET-CT if not previously  performed during surveillance  Consider pelvic MRI to aid in further treatment planning    History of tobacco abuse  -importance of complete and permanent abstinence discussed and strongly emphasized  Patient quit smoking 1/9/2024    History of Graves disease  -treated with radioactive iodine in 1990 9-2000, resulting in iatrogenic hypothyroidism; subsequently, on thyroid replacement therapy   Follow-up with PCP    Above discussed at length with the patient.  All questions answered.  Plan of management discussed in detail.  Potential side effects of cisplatin discussed.  She understands and agrees with this plan.

## 2024-03-07 ENCOUNTER — OFFICE VISIT (OUTPATIENT)
Dept: HEMATOLOGY/ONCOLOGY | Facility: CLINIC | Age: 59
End: 2024-03-07
Payer: MEDICAID

## 2024-03-07 VITALS
HEART RATE: 65 BPM | SYSTOLIC BLOOD PRESSURE: 137 MMHG | WEIGHT: 116 LBS | TEMPERATURE: 98 F | BODY MASS INDEX: 21.35 KG/M2 | RESPIRATION RATE: 20 BRPM | OXYGEN SATURATION: 100 % | DIASTOLIC BLOOD PRESSURE: 82 MMHG | HEIGHT: 62 IN

## 2024-03-07 DIAGNOSIS — D63.8 ANEMIA OF CHRONIC DISEASE: ICD-10-CM

## 2024-03-07 DIAGNOSIS — Z86.39 HISTORY OF GRAVES' DISEASE: ICD-10-CM

## 2024-03-07 DIAGNOSIS — Z72.0 TOBACCO ABUSE: ICD-10-CM

## 2024-03-07 DIAGNOSIS — C51.9 SQUAMOUS CELL CARCINOMA OF VULVA: Primary | ICD-10-CM

## 2024-03-07 PROCEDURE — 3075F SYST BP GE 130 - 139MM HG: CPT | Mod: CPTII,,, | Performed by: NURSE PRACTITIONER

## 2024-03-07 PROCEDURE — 99214 OFFICE O/P EST MOD 30 MIN: CPT | Mod: S$PBB,,, | Performed by: NURSE PRACTITIONER

## 2024-03-07 PROCEDURE — 99214 OFFICE O/P EST MOD 30 MIN: CPT | Mod: PBBFAC | Performed by: NURSE PRACTITIONER

## 2024-03-07 PROCEDURE — 1159F MED LIST DOCD IN RCRD: CPT | Mod: CPTII,,, | Performed by: NURSE PRACTITIONER

## 2024-03-07 PROCEDURE — 1160F RVW MEDS BY RX/DR IN RCRD: CPT | Mod: CPTII,,, | Performed by: NURSE PRACTITIONER

## 2024-03-07 PROCEDURE — 3079F DIAST BP 80-89 MM HG: CPT | Mod: CPTII,,, | Performed by: NURSE PRACTITIONER

## 2024-03-07 PROCEDURE — 4010F ACE/ARB THERAPY RXD/TAKEN: CPT | Mod: CPTII,,, | Performed by: NURSE PRACTITIONER

## 2024-03-07 PROCEDURE — 3008F BODY MASS INDEX DOCD: CPT | Mod: CPTII,,, | Performed by: NURSE PRACTITIONER

## 2024-03-07 NOTE — PROGRESS NOTES
Reason for Follow-up:  Chemotherapy teaching Cisplatin for Squamous Cell Carcinoma of Vulva    Current Treatment:  -cisplatin 40 mg per m2 IV day 1, every 7 days x7 weeks with concurrent radiotherapy  start 11/30/23    Treatment Modificatons:  1/4/24: Week 7 held due to   1/11/24: Week 8 held due to     Treatment History:  Past medical history:  Rasheeda-Parkinson-White syndrome.  Graves disease, treated with radioactive iodine in 1999 or 2000, resulting in iatrogenic hypothyroidism; subsequently, on thyroid replacement therapy.  Hypertension.  Anxiety.  Tobacco abuse.  History of abnormal Pap smears in the past; no regular follow-up.  According to her, history of abnormal Pap smears, HPV 16 positive    Surgical/procedure history:  Hysterectomy for AUB/fibroids at age 37.     Social history:  Single.  Has 3 children.  Lives in [place], [place].  Used to work as a ; does not work anymore.  Has been smoking < half a pack of cigarettes daily for 10-15 years.  Used to drink 3 beers 3 X a week; now, down to or 3 beers once a week.  No illicit drugs.    Family history:  Daughter experienced ovarian cancer at age 31.    Health maintenance:  PCP in [place], [place].  -04/25/2023: Bilateral screening mammogram (comparison:  None):  BI-RADS 1-negative  -no screening colonoscopy ever.    Gyn history:  Pregnancy x3.  Vaginal birth x3.  Hysterectomy for AUB/fibroids at age 37.      History of Present Illness:     Oncologic/Hematologic History:  Oncology History   Squamous cell carcinoma of vulva   10/31/2023 Initial Diagnosis    Squamous cell carcinoma of vulva     10/31/2023 Cancer Staged    Staging form: Vulva, AJCC 8th Edition  - Clinical stage from 10/31/2023: FIGO Stage IIIB, calculated as Stage LINH (cT3, cN2b, cM0)     11/30/2023 -  Chemotherapy    Treatment Summary   Plan Name: OP GYN CISPLATIN WEEKLY  Treatment Goal: Curative  Status: Active  Start Date: 11/30/2023  End Date:  1/19/2024  Provider: Wai Verduzco MD  Chemotherapy: CISplatin (Platinol) 40 mg/m2 = 64 mg in sodium chloride 0.9% 629 mL chemo infusion, 40 mg/m2 = 64 mg, Intravenous, Clinic/HOD 1 time, 6 of 6 cycles  Dose modification: 32 mg/m2 (80 % of original dose 40 mg/m2, Cycle 7, Reason: Treatment Parameters Not Met)  Administration: 64 mg (11/30/2023), 60 mg (12/7/2023), 63 mg (12/14/2023), 63 mg (12/21/2023), 63 mg (12/28/2023)     58-year-old lady, referred by Dr. Harjinder Dumont, gyn onc, [facility], [place], [place], with squamous cell carcinoma of vulva.    Interval History 3/7/2024: Patient presents to clinic alone for scheduled follow-up visit status post completion of chemoradiation to treat squamous cell carcinoma of the vulva.  Patient completed radiation on Monday February 12th.  Patient reports doing well without any new or worsening symptoms.  Patient says area radiated has improved tremendously.  She no longer has any areas of exposed skin.  Patient also reports that she is now able to wear normal underwear  Patient reports appetite has improved however has not gained very much of her weight back.  She does admit to not drinking very much water however will attempt to improve on the water intake. She denies any abdominal pain, vaginal bleeding, anginal blisters or swelling, constipation, or diarrhea.  Lab work reviewed with the patient, stable.  She is aware of upcoming scans.  Patient says she will follow up with gyn at the end of April.  We discussed plan of care and follow-up appointments with the patient.      Review of Systems:   Review of Systems   Respiratory:  Negative for cough and shortness of breath.    Gastrointestinal:  Negative for abdominal pain and diarrhea.   Genitourinary:  Negative for frequency.   Musculoskeletal:  Negative for back pain.   Neurological:  Negative for headaches.   Psychiatric/Behavioral:  The patient is nervous/anxious.    All other systems  reviewed and are negative.      Physical Exam  Constitutional:       Appearance: Normal appearance.   HENT:      Head: Normocephalic.      Mouth/Throat:      Mouth: Mucous membranes are moist.   Eyes:      Conjunctiva/sclera: Conjunctivae normal.      Pupils: Pupils are equal, round, and reactive to light.   Cardiovascular:      Rate and Rhythm: Normal rate and regular rhythm.   Pulmonary:      Breath sounds: Normal breath sounds.   Abdominal:      General: Bowel sounds are normal.      Palpations: Abdomen is soft.   Musculoskeletal:      Cervical back: Normal range of motion.   Skin:     General: Skin is warm and dry.   Neurological:      Mental Status: She is alert and oriented to person, place, and time.   Psychiatric:         Mood and Affect: Mood normal.       Vitals:    03/07/24 0925   BP: 137/82   Pulse: 65   Resp: 20   Temp: 97.5 °F (36.4 °C)     Lab Results   Component Value Date    WBC 2.73 (L) 03/07/2024    RBC 3.15 (L) 03/07/2024    HGB 10.7 (L) 03/07/2024    HCT 31.8 (L) 03/07/2024    .0 (H) 03/07/2024    MCH 34.0 (H) 03/07/2024    MCHC 33.6 03/07/2024    RDW 15.0 03/07/2024     03/07/2024    MPV 8.4 03/07/2024    EOS 0.2 02/23/2023    BASO 0.1 02/23/2023    EOSINOPHIL 4 02/23/2023     CMP  Sodium   Date Value Ref Range Status   09/18/2021 138 136 - 145 mmol/L Final     Sodium Level   Date Value Ref Range Status   03/07/2024 136 136 - 145 mmol/L Final     Potassium   Date Value Ref Range Status   08/24/2023 5.1 3.5 - 5.2 mmol/L Final     Potassium Level   Date Value Ref Range Status   03/07/2024 5.3 (H) 3.5 - 5.1 mmol/L Final     Chloride   Date Value Ref Range Status   09/18/2021 103 100 - 109 mmol/L Final     Carbon Dioxide   Date Value Ref Range Status   03/07/2024 20 (L) 22 - 29 mmol/L Final   09/18/2021 25 22 - 33 mmol/L Final     Blood Urea Nitrogen   Date Value Ref Range Status   03/07/2024 18.3 9.8 - 20.1 mg/dL Final   09/18/2021 15 5 - 25 mg/dL Final     Creatinine   Date Value  Ref Range Status   03/07/2024 1.39 (H) 0.55 - 1.02 mg/dL Final   09/18/2021 0.96 0.57 - 1.25 mg/dL Final     Calcium   Date Value Ref Range Status   09/18/2021 9.2 8.8 - 10.6 mg/dL Final     Calcium Level Total   Date Value Ref Range Status   03/07/2024 9.5 8.4 - 10.2 mg/dL Final     Albumin Level   Date Value Ref Range Status   03/07/2024 3.9 3.5 - 5.0 g/dL Final     Bilirubin Total   Date Value Ref Range Status   03/07/2024 0.3 <=1.5 mg/dL Final     Alkaline Phosphatase   Date Value Ref Range Status   03/07/2024 73 40 - 150 unit/L Final     Aspartate Aminotransferase   Date Value Ref Range Status   03/07/2024 13 5 - 34 unit/L Final     Alanine Aminotransferase   Date Value Ref Range Status   03/07/2024 8 0 - 55 unit/L Final     Anion Gap   Date Value Ref Range Status   09/18/2021 10 8 - 16 mmol/L Final     eGFR   Date Value Ref Range Status   03/07/2024 44 mls/min/1.73/m2 Final     Assessment:  Squamous cell carcinoma of vulva:  -presentation:  Left vulvar painful, tender mass for 1 year before seeking care in 09/2023  -vulvar biopsy 09/21/2023: Squamous cell carcinoma, extending to the deep and peripheral margins  -gyn exam 09/15/2023:  Friable, atypical borders, tender, entire left labia replaced by tumor/condylomatous appearance in several areas; left inguinal lymphadenopathy  -Gyn Onc exam 09/29/2023:  Very large vulvar mass involving the mid and posterior left labial structures with areas of ulceration, etc.; no fixation; distal vagina involved; involve the left anterior portion of the anus as well; no extension into deeper tissue; 8 cm x 5 cm;  -MRI pelvis with and without contrast 10/20/2023:  Left vulvar mass involving the left perineum and lower 2/3 of the vagina; 8.0 x 2.5 x 5.4 cm; no urethral invasion; abutment of the anterior rectal wall is indeterminate for invasion; left inguinal lymphadenopathy (2 abnormal lymph nodes, largest 2.0 cm; a 3rd lymph node similar appearance nonenlarged)  -PET-CT  10/31/2023:  No distant metastases; no intrapelvic lymphadenopathy; 2.4 cm hypermetabolic left inguinal lymph node; additionally, hypermetabolic lymph node adjacent to it as well; primary tumor, hypermetabolic, involving a portion of the vaginal canal)  To summarize:  -squamous cell carcinoma of the vulva (left labia)  -disease involving lower 2/3 of the vagina on MRI  -left inguinal lymphadenopathy (regional lymphadenopathy), 2.0 cm on MRI pelvis, 2.4 cm on PET-CT (> 5 mm), therefore, FIGO stage IIIB  -11/01/2023: CBC normal; hemoglobin 13.7; CMP unremarkable; HIV nonreactive  -11/14/2023: Message from Dr. Harjinder Maynard's office:  Recommendation to proceed with chemoradiation therapy; if persistent disease after treatment, then, will plan surgical resection      Father experienced prostate cancer at age 70   Daughter experienced ovarian cancer at age 31    History of hysterectomy at age 37 for AUB/uterine fibroids  History of Graves disease, treated; subsequently, hypothyroid  History of tobacco abuse       Plan:  Squamous cell carcinoma of vulva  -squamous cell carcinoma of the vulva (left labia)  -disease involving lower 2/3 of the vagina on MRI  -left inguinal lymphadenopathy (regional lymphadenopathy), 2.0 cm on MRI pelvis, 2.4 cm on PET-CT (> 5 mm), therefore, FIGO stage IIIB  -locally advanced disease; unresectable  -radiologically suspicious nodes (left inguinal lymphadenopathy on MRI and every PET-CT)    Primary treatment:   1. Inguinofemoral lymphadenectomy; if positive lymph nodes, then, EBRT +concurrent chemotherapy to primary tumor/inguinofemoral lymph node/pelvic lymph nodes  2. Inguinofemoral lymphadenectomy; if negative lymph node, then, EBRT +concurrent chemotherapy to primary tumor (+/- selective inguinofemoral lymph node coverage)  3. If inguinofemoral lymphadenectomy not performed then:  Consider FNA for enlarged lymph nodes, followed by EBRT +concurrent chemotherapy to primary  tumor/inguinofemoral lymph nodes/pelvic lymph nodes    >>>  -11/01/2023: Message from Dr. Harjinder Dumont:  He wishes to perform inguinofemoral lymphadenectomy  -therefore, we will defer chemoradiation therapy until after she heals up from surgery  -11/14/2023: Message from Dr. Harjinder Maynard's office:  Recommendation to proceed with chemoradiation therapy; if persistent disease after treatment, then, will plan surgical resection  >>>    Followed by evaluation of response to EBRT +concurrent chemotherapy:   Consider FDG PET-CT at 3-6 months to assess treatment response after definitive primary treatment  1. If clinically negative for residual tumor at primary site and nodes, then: Surveillance  2. If clinically negative for residual tumor at primary site and nodes, then: Consider biopsy of tumor bed to confirm pathologic complete response (no sooner than 3 months after completion of treatment); if biopsy negative, then, surveillance; if biopsy positive, then, resection, followed by surveillance for negative margins, and consideration of additional surgery (consider pelvic exenteration for selected cases with a central recurrence), additional EBRT and/or systemic therapy for positive margins for invasive disease   3. If clinically positive for residual tumor at primary site and/or nodes, then:  -resection, followed by surveillance for negative margins  -resection, followed by additional surgery/additional EBRT, N/or systemic therapy for positive margins for invasive disease  -if unresectable, then, consider additional EBRT and/or systemic therapy or best supportive care      Surveillance (after completion of treatment):  1. Interval history and physical every 3-6 months for 2 years, then every 6-12 months for 3-5 years, then annually based on patient's risk of disease recurrence  2. Cervical/vaginal cytology screening for detection of lower genital tract neoplasia (may include HPV testing)  3. Imaging as indicated based  on symptoms or examination findings suspicious for recurrence  4. Laboratory assessment as indicated based on symptoms or examination findings suspicious for recurrence  5. Patient Education regarding symptoms of potential recurrence and vulvar dystrophy, periodic self examinations, lifestyle, obesity, exercise, 6 health (including vaginal dilator use and lubricant/moisturizers), smoking cessation, nutrition counseling, and potential long-term and late effects of treatment     Preferred chemotherapy for chemoradiation therapy:  -cisplatin  -carboplatin if patient cisplatin intolerant  -other recommended regimens: Cisplatin/5 FU; capecitabine/mitomycin; gemcitabine; paclitaxel    -proceed with EBRT +concurrent chemotherapy to primary tumor/inguinofemoral lymph node/pelvic lymph nodes  -Preferred chemotherapy:  Cisplatin   -cisplatin 40 mg per m2 IV day 1, every 7 days x7 weeks with concurrent radiotherapy    -follow-up with Dr. Verduzco in 4 weeks with PET-CT scan results and lab work (CBC, CMP)  -Weekly lab work (cbc,cmp,mg)  -Three months after completion of chemoradiation therapy, FDG PET-CT to assess response (do not perform FDG PET-CT before 3 months after completion of chemoradiation therapy)-Mid April-Scheduled 2024  -Genetic testing because daughter experienced ovarian cancer at age 31-Scheduled 2024  --------------------------------      Monitoring:  -monitor for hypersensitivity reactions, neuropathy, ototoxicity, renal toxicity, electrolyte imbalance, etc.  Monitor for hypersensitivity reactions, electrolyte abnormalities (hypomagnesemia, hypokalemia), renal dysfunction, peripheral neuropathy, ototoxicity.    vulvar pain  Continue Norco (prescribed by Radiation Oncology)  as of today 02/15/2024 patient says that she has not had to use any of the Norco    Consider HIV testing    Imagin. Initial workup:  Chest x-ray/chest CT without contrast; pelvic MRI to aid in surgical and/or radiation  treatment planning; consider neck/chest/abdomen/pelvis/groin FDG PET-CT or CT C/A/P with contrast for T2 or larger tumors or metastases suspected, etc.    Follow-up/surveillance imaging:  CT C/A/P with contrast or neck/chest/abdomen/pelvis/groin FDG PET-CT if recurrence/metastases suspected  Consider FDG PET-CT at 3-6 months to assess treatment response after definitive primary treatment    Imaging for suspected or documented recurrence:   Consider neck/chest/abdomen/pelvis/groin FDG PET-CT if not previously performed during surveillance  Consider pelvic MRI to aid in further treatment planning    History of tobacco abuse  -importance of complete and permanent abstinence discussed and strongly emphasized  Patient quit smoking 1/9/2024    History of Graves disease  -treated with radioactive iodine in 1990 9-2000, resulting in iatrogenic hypothyroidism; subsequently, on thyroid replacement therapy   Follow-up with PCP    Above discussed at length with the patient.  All questions answered.  Plan of management discussed in detail.  Potential side effects of cisplatin discussed.  She understands and agrees with this plan.

## 2024-04-26 DIAGNOSIS — C51.9 SQUAMOUS CELL CARCINOMA OF VULVA: Primary | ICD-10-CM

## 2024-05-15 ENCOUNTER — TELEPHONE (OUTPATIENT)
Dept: HEMATOLOGY/ONCOLOGY | Facility: CLINIC | Age: 59
End: 2024-05-15
Payer: MEDICAID

## 2024-05-20 ENCOUNTER — HOSPITAL ENCOUNTER (OUTPATIENT)
Dept: RADIOLOGY | Facility: HOSPITAL | Age: 59
Discharge: HOME | End: 2024-05-20
Attending: INTERNAL MEDICINE
Payer: MEDICAID

## 2024-05-20 DIAGNOSIS — C51.9 SQUAMOUS CELL CARCINOMA OF VULVA: ICD-10-CM

## 2024-05-20 PROCEDURE — 78815 PET IMAGE W/CT SKULL-THIGH: CPT | Mod: TC

## 2024-05-20 PROCEDURE — A9552 F18 FDG: HCPCS | Performed by: INTERNAL MEDICINE

## 2024-05-20 RX ORDER — FLUDEOXYGLUCOSE F18 500 MCI/ML
10 INJECTION INTRAVENOUS
Status: COMPLETED | OUTPATIENT
Start: 2024-05-20 | End: 2024-05-20

## 2024-05-20 RX ADMIN — FLUDEOXYGLUCOSE F-18 11 MILLICURIE: 500 INJECTION INTRAVENOUS at 09:05

## 2024-05-29 ENCOUNTER — TELEPHONE (OUTPATIENT)
Dept: HEMATOLOGY/ONCOLOGY | Facility: CLINIC | Age: 59
End: 2024-05-29
Payer: MEDICAID

## 2024-05-30 ENCOUNTER — OFFICE VISIT (OUTPATIENT)
Dept: HEMATOLOGY/ONCOLOGY | Facility: CLINIC | Age: 59
End: 2024-05-30
Attending: INTERNAL MEDICINE
Payer: MEDICAID

## 2024-05-30 ENCOUNTER — APPOINTMENT (OUTPATIENT)
Dept: HEMATOLOGY/ONCOLOGY | Facility: CLINIC | Age: 59
End: 2024-05-30
Payer: MEDICAID

## 2024-05-30 VITALS
TEMPERATURE: 98 F | OXYGEN SATURATION: 100 % | WEIGHT: 122.81 LBS | HEIGHT: 62 IN | SYSTOLIC BLOOD PRESSURE: 148 MMHG | BODY MASS INDEX: 22.6 KG/M2 | DIASTOLIC BLOOD PRESSURE: 83 MMHG | RESPIRATION RATE: 18 BRPM | HEART RATE: 66 BPM

## 2024-05-30 DIAGNOSIS — R59.0 INGUINAL LYMPHADENOPATHY: ICD-10-CM

## 2024-05-30 DIAGNOSIS — Z92.21 STATUS POST CHEMORADIATION: ICD-10-CM

## 2024-05-30 DIAGNOSIS — C51.9 SQUAMOUS CELL CARCINOMA OF VULVA: ICD-10-CM

## 2024-05-30 DIAGNOSIS — Z90.710 HISTORY OF HYSTERECTOMY: ICD-10-CM

## 2024-05-30 DIAGNOSIS — D72.819 LEUKOPENIA, UNSPECIFIED TYPE: ICD-10-CM

## 2024-05-30 DIAGNOSIS — Z92.3 STATUS POST CHEMORADIATION: ICD-10-CM

## 2024-05-30 DIAGNOSIS — Z72.0 TOBACCO ABUSE: ICD-10-CM

## 2024-05-30 DIAGNOSIS — E03.9 HYPOTHYROIDISM, UNSPECIFIED TYPE: ICD-10-CM

## 2024-05-30 DIAGNOSIS — Z90.710 HISTORY OF HYSTERECTOMY: Primary | ICD-10-CM

## 2024-05-30 DIAGNOSIS — Z86.39 HISTORY OF GRAVES' DISEASE: ICD-10-CM

## 2024-05-30 DIAGNOSIS — Z80.41 FAMILY HISTORY OF OVARIAN CANCER: ICD-10-CM

## 2024-05-30 LAB
ABS NEUT CALC (OHS): 0.84 X10(3)/MCL (ref 2.1–9.2)
ALBUMIN SERPL-MCNC: 3.5 G/DL (ref 3.5–5)
ALBUMIN/GLOB SERPL: 1.1 RATIO (ref 1.1–2)
ALP SERPL-CCNC: 73 UNIT/L (ref 40–150)
ALT SERPL-CCNC: 11 UNIT/L (ref 0–55)
ANION GAP SERPL CALC-SCNC: 5 MEQ/L
AST SERPL-CCNC: 15 UNIT/L (ref 5–34)
BASOPHILS NFR BLD MANUAL: 0.04 X10(3)/MCL (ref 0–0.2)
BASOPHILS NFR BLD MANUAL: 2 % (ref 0–2)
BILIRUB SERPL-MCNC: 0.3 MG/DL
BUN SERPL-MCNC: 14.8 MG/DL (ref 9.8–20.1)
CALCIUM SERPL-MCNC: 9.2 MG/DL (ref 8.4–10.2)
CHLORIDE SERPL-SCNC: 104 MMOL/L (ref 98–107)
CO2 SERPL-SCNC: 26 MMOL/L (ref 22–29)
CREAT SERPL-MCNC: 1.12 MG/DL (ref 0.55–1.02)
CREAT/UREA NIT SERPL: 13
EOSINOPHIL NFR BLD MANUAL: 0.05 X10(3)/MCL (ref 0–0.9)
EOSINOPHIL NFR BLD MANUAL: 3 % (ref 0–8)
ERYTHROCYTE [DISTWIDTH] IN BLOOD BY AUTOMATED COUNT: 13 % (ref 11.5–17)
GFR SERPLBLD CREATININE-BSD FMLA CKD-EPI: 57 ML/MIN/1.73/M2
GLOBULIN SER-MCNC: 3.3 GM/DL (ref 2.4–3.5)
GLUCOSE SERPL-MCNC: 80 MG/DL (ref 74–100)
HCT VFR BLD AUTO: 37.8 % (ref 37–47)
HGB BLD-MCNC: 12.7 G/DL (ref 12–16)
LYMPH ABN # BLD MANUAL: 2 %
LYMPHOCYTES NFR BLD MANUAL: 0.47 X10(3)/MCL
LYMPHOCYTES NFR BLD MANUAL: 27 % (ref 13–40)
MAGNESIUM SERPL-MCNC: 2 MG/DL (ref 1.6–2.6)
MCH RBC QN AUTO: 33 PG (ref 27–31)
MCHC RBC AUTO-ENTMCNC: 33.6 G/DL (ref 33–36)
MCV RBC AUTO: 98.2 FL (ref 80–94)
MONOCYTES NFR BLD MANUAL: 0.32 X10(3)/MCL (ref 0.1–1.3)
MONOCYTES NFR BLD MANUAL: 18 % (ref 2–11)
NEUTROPHILS NFR BLD MANUAL: 47 % (ref 47–80)
NEUTS BAND NFR BLD MANUAL: 1 % (ref 0–11)
NRBC BLD AUTO-RTO: 0 %
PLATELET # BLD AUTO: 210 X10(3)/MCL (ref 130–400)
PLATELET # BLD EST: ADEQUATE 10*3/UL
PMV BLD AUTO: 8.7 FL (ref 7.4–10.4)
POTASSIUM SERPL-SCNC: 4.2 MMOL/L (ref 3.5–5.1)
PROT SERPL-MCNC: 6.8 GM/DL (ref 6.4–8.3)
RBC # BLD AUTO: 3.85 X10(6)/MCL (ref 4.2–5.4)
RBC MORPH BLD: NORMAL
SODIUM SERPL-SCNC: 135 MMOL/L (ref 136–145)
WBC # SPEC AUTO: 1.75 X10(3)/MCL (ref 4.5–11.5)

## 2024-05-30 PROCEDURE — 36415 COLL VENOUS BLD VENIPUNCTURE: CPT

## 2024-05-30 PROCEDURE — 99214 OFFICE O/P EST MOD 30 MIN: CPT | Mod: PBBFAC | Performed by: INTERNAL MEDICINE

## 2024-05-30 PROCEDURE — 1160F RVW MEDS BY RX/DR IN RCRD: CPT | Mod: CPTII,,, | Performed by: INTERNAL MEDICINE

## 2024-05-30 PROCEDURE — 3077F SYST BP >= 140 MM HG: CPT | Mod: CPTII,,, | Performed by: INTERNAL MEDICINE

## 2024-05-30 PROCEDURE — 1159F MED LIST DOCD IN RCRD: CPT | Mod: CPTII,,, | Performed by: INTERNAL MEDICINE

## 2024-05-30 PROCEDURE — 99214 OFFICE O/P EST MOD 30 MIN: CPT | Mod: S$PBB,,, | Performed by: INTERNAL MEDICINE

## 2024-05-30 PROCEDURE — 3008F BODY MASS INDEX DOCD: CPT | Mod: CPTII,,, | Performed by: INTERNAL MEDICINE

## 2024-05-30 PROCEDURE — 83735 ASSAY OF MAGNESIUM: CPT

## 2024-05-30 PROCEDURE — 4010F ACE/ARB THERAPY RXD/TAKEN: CPT | Mod: CPTII,,, | Performed by: INTERNAL MEDICINE

## 2024-05-30 PROCEDURE — 80053 COMPREHEN METABOLIC PANEL: CPT

## 2024-05-30 PROCEDURE — 3079F DIAST BP 80-89 MM HG: CPT | Mod: CPTII,,, | Performed by: INTERNAL MEDICINE

## 2024-05-30 PROCEDURE — 85027 COMPLETE CBC AUTOMATED: CPT

## 2024-05-30 NOTE — PROGRESS NOTES
History:  History reviewed. No pertinent past medical history.    Past Surgical History:   Procedure Laterality Date    HYSTERECTOMY        Social History     Socioeconomic History    Marital status:    Tobacco Use    Smoking status: Former     Current packs/day: 0.00     Types: Cigarettes     Quit date: 2023     Years since quittin.3    Smokeless tobacco: Never   Substance and Sexual Activity    Alcohol use: Not Currently    Drug use: Never    Sexual activity: Not Currently     Social Determinants of Health     Financial Resource Strain: Patient Declined (2023)    [facility]    Overall Financial Resource Strain (CARDIA)     Difficulty of Paying Living Expenses: Patient declined   Food Insecurity: No Food Insecurity (2023)    [facility] from [facility] and [facility], [facility] and Aff[facility]ates    Hunger Vital Sign     Worried About Running Out of Food in the Last Year: Never true     Ran Out of Food in the Last Year: Never true   Transportation Needs: No Transportation Needs (2023)    Received from [facility] and [facility], [facility] and Affiliates    PRAPARE - Transportation     Lack of Transportation (Medical): No     Lack of Transportation (Non-Medical): No   Physical Activity: Insufficiently Active (2023)    [facility]    Exercise Vital Sign     Days of Exercise per Week: 1 day     Minutes of  Exercise per Session: 30 min   Stress: Patient Declined (2/17/2023)    [facility]    [employer] [place] [employer] - Occupational Stress Questionnaire     Feeling of Stress : Patient declined   Housing Stability: Low Risk  (2/17/2023)    [facility]    Housing Stability Vital Sign     Unable to Pay for Housing in the Last Year: No     Number of Places Lived in the Last Year: 1     In the last 12 months, was there a time when you did not have a steady place to sleep or slept in a shelter (including now)?: No   Past medical history:  Rasheeda-Parkinson-White syndrome.  Graves disease, treated with radioactive iodine in 1999 or 2000, resulting in iatrogenic hypothyroidism; subsequently, on thyroid replacement therapy.  Hypertension.  Anxiety.  Tobacco abuse.  History of abnormal Pap smears in the past; no regular follow-up.  According to her, history of abnormal Pap smears, HPV 16 positive    Surgical/procedure history:  Hysterectomy for AUB/fibroids at age 37.     Social history:  Single.  Has 3 children.  Lives in [place], [place].  Used to work as a ; does not work anymore.  Has been smoking < half a pack of cigarettes daily for 10-15 years.  Used to drink 3 beers 3 X a week; now, down to or 3 beers once a week.  No illicit drugs.    Family history:  Daughter experienced ovarian cancer at age 31. Father experienced prostate cancer at age 70     Health maintenance:  PCP in [place], [place].  -04/25/2023: Bilateral screening mammogram (comparison:  None):  BI-RADS 1-negative  -no screening colonoscopy ever.    Gyn history:  Pregnancy x3.  Vaginal  birth x3.  Hysterectomy for AUB/fibroids at age 37.      No family history on file.     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), biopsy 09/21/2023, MRI pelvis 10/20/2023, FDG PET-CT 10/31/2023, disease involving lower 2/3 of vagina on MRI, left inguinal lymphadenopathy on MRI and PET-CT (2.0 cm and 2.4 cm, respectively), FIGO stage IIIB  -father experienced prostate cancer at age 70   -daughter experienced ovarian cancer at age 31  -history of tobacco abuse  -Graves disease, hypothyroidism  -history of hysterectomy for AUB/uterine fibroids at age 37    History of Present Illness:   Squamous cell carcinoma of vulva        Oncologic/Hematologic History:  Oncology History   Squamous cell carcinoma of vulva   10/31/2023 Initial Diagnosis    Squamous cell carcinoma of vulva     10/31/2023 Cancer Staged    Staging form: Vulva, AJCC 8th Edition  - Clinical stage from 10/31/2023: FIGO Stage IIIB, calculated as Stage LINH (cT3, cN2b, cM0)     11/30/2023 -  Chemotherapy    Treatment Summary   Plan Name: OP GYN CISPLATIN WEEKLY  Treatment Goal: Curative  Status: Active  Start Date: 11/30/2023  End Date: 1/19/2024  Provider: Wai Verduzco MD  Chemotherapy: CISplatin (Platinol) 40 mg/m2 = 64 mg in sodium chloride 0.9% 629 mL chemo infusion, 40 mg/m2 = 64 mg, Intravenous, Clinic/HOD 1 time, 6 of 6 cycles  Dose modification: 32 mg/m2 (80 % of original dose 40 mg/m2, Cycle 7, Reason: Treatment Parameters Not Met)  Administration: 64 mg (11/30/2023), 60 mg (12/7/2023), 63 mg (12/14/2023), 63 mg (12/21/2023), 63 mg (12/28/2023)     58-year-old lady, referred by Dr. Harjinder Dumont, gyn onc, [facility], [place], [place], with squamous cell carcinoma of vulva.      09/15/2023:  Gyn office note (Jacinda Berger MD, gyn):  Left vulvar mass for 1 year; growth over last 6 months; pain; rubs on clothes  History of hysterectomy in the past  Noticed mass 1 year ago; was caring for her  mother who was bedridden; now, ; noticed the mass growing; pain in the area; no problems with bowels or urination  History of hysterectomy for AUB/fibroids.  History of abnormal Pap smears for years.  Smoker.  Not sexually active in sometime.  Last gyn/ exam was shortly after hysterectomy at age 37.   Exam:  Friable, atypical borders, tender, entire left labia replaced by tumor/condylomatous appearance in several areas; right labia no lesion; left labia tenderness, lesions, and skin changes; inguinal lymphadenopathy present on the left side; no inguinal lymphadenopathy on the right side; unable to placed speculum in vagina due to tenderness at enteritis      2023:  Dr. Harjinder Dumont, gyn onc office note (referred by Dr. Jacinda Berger, gyn):  Vaginal pain, 4/10  Left vulvar tender mass.  She has been providing care for her mother for the last year, and therefore, allowed the mass to grow without evaluation because of her focus on caregiving.  Gyn assessment 09/15/2023 showed a large mass suspicious for cancer.  Biopsy was done on 2023, revealing squamous cell carcinoma.  Other than the direct vulvar discomfort related to the mass, she denied significant bleeding, discharge, altered bladder or bowel function, leg swelling, weight loss, or anorexia.  The mass was quite tender and painful with sitting and activities, and very tender to touch.  No history of vulvar lesions or intraepithelial neoplasia  Physical exam (Dr. Harjinder Dumont):  Very large vulvar mass consistent with squamous cell carcinoma involving most of the mid and posterior left labial structures with areas of ulceration; is zone of induration around the mass that extends into the subcutaneous tissues; no fixation to the underlying fascia or bone; the mass extends around the perineal body to involve the right side of the perineal region; the distal vagina also appears involved by the tumor; the mass is difficult to assess because with  the patient's tenderness; the mass also extends posteriorly to involve the left anterior portion of the anus; no extension into the deeper tissues; overall size 8 cm x 5 cm, and thickness difficult to clarify; vaginal exam was not feasible because of the size of tumor and the performed tenderness at the patient was experiencing  Assessment and plan (Dr. Harjinder Maynard):  Large squamous cell carcinoma of the vulva; T2 with involvement of the distal vagina and exterior anus; quite large; not usually considered best treated with surgery as the initial approach; standard therapy would be chemoradiation therapy; if there is any residual tumor remaining after initial therapy, then, surgical resection would be considered.  Pelvic and vulvar MRI and PET-CT scan recommended.      Investigations reviewed:    2023:  Vulva, biopsy ([facility]):  -squamous cell carcinoma, extending to the deep and peripheral margins    10/20/2023: MRI pelvis with and without contrast (staging)   1. Biopsy-proven malignant left vulvar mass involves the the left perineum and lower two thirds of the vagina. Abutment of the anterior rectal wall is indeterminate for invasion.   2. Left inguinal adenopathy, compatible with metastatic disease  (enhancing soft tissue mass involving the left perineum, left vulva, and the lower 2/3 of vagina, measuring 8.0 x 2.5 by 5.4 cm; loss of normal fat plane between mass in the anterior rectum, indeterminate for invasion; no evidence of urethral invasion; 2 adjacent morphologically abnormal rounded and enlarged left inguinal lymph nodes present, largest 2.0 cm; a 3rd lymph node similar appearing the nonenlarged left inguinal lymph node)    10/31/2023: FDG PET-CT for stagin. Hypermetabolic activity at the bulb on volume portion of the vaginal canal although some of this activity may reflect urinary contamination. This is the site of the primary lesion.   2. Hypermetabolic adenopathy  within the left inguinal node chain.   (No intrapelvic lymphadenopathy is seen. There is a large hypermetabolic lymph node within the left inguinal chain measuring 2.4 cm and SUV max of 3.4. A smaller hypermetabolic lymph node is seen superficially adjacent to it measuring SUV max 2.3. Hypermetabolic activity at the vulva involving a portion of the vaginal canal is identified, SUV max measuring 11.8. Some of this activity may reflect urinary contamination. The SUV max of the urinary bladder is 10.2.)    11/01/2023:   Pleasant lady who presents for initial medical oncology consultation, accompanied by her father.  In no acute discomfort.  Intermittent pain in the left vulvar mass.  On Norco 7.5 mg p.o. PRN; she takes half a pill 3 X daily.  It was prescribed by Dr. Familia Red, radiation oncologist.  No ulceration over the mass.  Occasional spotting.  No vaginal bleeding.    Also has experienced itching over the vulvar mass.  Anorexia and 20 lb weight loss over last 3 months.  No unusual headaches, focal neurological symptoms, chest pain, cough, dyspnea, any other lumps or lymphadenopathy, abdominal pain, nausea, vomiting, any urinary or bowel symptoms, etc..  ECOG 0.  Presents for a follow-up visit, accompanied by a male family member, probably .  No bleeding or discharge from vulvar lesion.  Variable degree of pain related to overall cancer, wearing in severity from 06/10 to 8/10.  Takes Norco, prescribed by Radiation Oncology.  Appetite is okay.  No chest pain, cough, dyspnea, abdominal pain, nausea, vomiting, anorexia, any bowel complaints, etc..  ECOG 0-1.  Anxiously awaiting start of chemoradiation therapy.    Interval History:  PICC DOUBLE LUMEN LINE FLUSH   OP GYN CISPLATIN WEEKLY     05/30/2024:   -S/P weekly cisplatin 40 mg per m2 IV x7 (11/30/2023-01/19/2024), concurrent with radiotherapy  -S/P radiotherapy, concurrent with chemotherapy, 11/30/2023-01/26/2024  -05/13/2024:  Restaging MRI pelvis  with and without contrast (comparison:  10/20/2023): Previously seen left vulvar mass no longer identified; interval improvement in left inguinal lymphadenopathy  -05/20/2024:  Restaging FDG PET-CT: Positive response to therapy with resolution of hypermetabolic activity along the vaginal orifice; low-level uptake within a normal sized left inguinal lymph node chain maybe reactive  -through chemoradiation therapy, hemoglobin dropped to 6.8 on 02/01/2024 (transfused PRBC x1 unit)  -after chemoradiation therapy, creatinine went of (1.39 on 03/07/2024; 1.15 on 02/15/2024; 1.24 on 02/01/2024, etc.)  -05/30/2024:  Today's labs including CBC, CMP, magnesium level are pending    Medications:  Current Outpatient Medications on File Prior to Visit   Medication Sig Dispense Refill    ALPRAZolam (XANAX) 0.25 MG tablet Take 1 tablet by mouth daily as needed.      diltiaZEM (CARDIZEM CD) 180 MG 24 hr capsule Take 180 mg by mouth.      levothyroxine (SYNTHROID) 125 MCG tablet Take 1 tablet by mouth every morning.      lisinopriL 10 MG tablet Take 10 mg by mouth.      LIDOcaine (XYLOCAINE) 5 % Oint ointment Apply pea sized amount to affected area q4hrs prn vulvar pain (Patient not taking: Reported on 5/30/2024)      ondansetron (ZOFRAN-ODT) 8 MG TbDL Take 1 tablet (8 mg total) by mouth every 6 (six) hours as needed (nausea). (Patient not taking: Reported on 5/30/2024) 30 tablet 3    oxyCODONE-acetaminophen (PERCOCET)  mg per tablet Take 1 tablet by mouth every 4 (four) hours as needed for Pain. (Patient not taking: Reported on 5/30/2024) 90 tablet 0     No current facility-administered medications on file prior to visit.       Review of Systems:   All systems reviewed and found to be negative except for the symptoms detailed above    Physical Examination:   VITAL SIGNS:   Vitals:    05/30/24 1438   BP: (!) 148/83   Pulse: 66   Resp: 18   Temp: 98.1 °F (36.7 °C)       GENERAL:  In no apparent distress.    HEAD:  No signs of  head trauma.  EYES:  Pupils are equal.  Extraocular motions intact.    EARS:  Hearing grossly intact.  MOUTH:  Oropharynx is normal.   NECK:  No adenopathy, no JVD.     CHEST:  Chest with clear breath sounds bilaterally.  No wheezes, rales, rhonchi.    CARDIAC:  Regular rate and rhythm.  S1 and S2, without murmurs, gallops, rubs.  VASCULAR:  No Edema.  Peripheral pulses normal and equal in all extremities.  ABDOMEN:  Soft, without detectable tenderness.  No sign of distention.  No   rebound or guarding, and no masses palpated.   Bowel Sounds normal.  MUSCULOSKELETAL:  Good range of motion of all major joints. Extremities without clubbing, cyanosis or edema.    NEUROLOGIC EXAM:  Alert and oriented x 3.  No focal sensory or strength deficits.   Speech normal.  Follows commands.  PSYCHIATRIC:  Mood normal.  -11/01/2023: I did not perform pelvic examination.    No results found for this or any previous visit.  No results found for this or any previous visit.    Assessment:  Problem List Items Addressed This Visit    None  Squamous cell carcinoma of vulva:  -presentation:  Left vulvar painful, tender mass for 1 year before seeking care in 09/2023  -vulvar biopsy 09/21/2023: Squamous cell carcinoma, extending to the deep and peripheral margins  -gyn exam 09/15/2023:  Friable, atypical borders, tender, entire left labia replaced by tumor/condylomatous appearance in several areas; left inguinal lymphadenopathy  -Gyn Onc exam 09/29/2023:  Very large vulvar mass involving the mid and posterior left labial structures with areas of ulceration, etc.; no fixation; distal vagina involved; involve the left anterior portion of the anus as well; no extension into deeper tissue; 8 cm x 5 cm;  -MRI pelvis with and without contrast 10/20/2023:  Left vulvar mass involving the left perineum and lower 2/3 of the vagina; 8.0 x 2.5 x 5.4 cm; no urethral invasion; abutment of the anterior rectal wall is indeterminate for invasion; left  inguinal lymphadenopathy (2 abnormal lymph nodes, largest 2.0 cm; a 3rd lymph node similar appearance nonenlarged)  -PET-CT 10/31/2023:  No distant metastases; no intrapelvic lymphadenopathy; 2.4 cm hypermetabolic left inguinal lymph node; additionally, hypermetabolic lymph node adjacent to it as well; primary tumor, hypermetabolic, involving a portion of the vaginal canal)  To summarize:  -squamous cell carcinoma of the vulva (left labia)  -disease involving lower 2/3 of the vagina on MRI  -left inguinal lymphadenopathy (regional lymphadenopathy), 2.0 cm on MRI pelvis, 2.4 cm on PET-CT (> 5 mm), therefore, FIGO stage IIIB  -11/01/2023: CBC normal; hemoglobin 13.7; CMP unremarkable; HIV nonreactive  -11/14/2023: Message from Dr. Harjinder Maynard's office:  Recommendation to proceed with chemoradiation therapy; if persistent disease after treatment, then, will plan surgical resection  -S/P weekly cisplatin 40 mg per m2 IV x7 (11/30/2023-01/19/2024), concurrent with radiotherapy  -S/P radiotherapy, concurrent with chemotherapy, 11/30/2023-01/26/2024  -restaging MRI pelvis 05/13/2024:  Left vulvar mass resolved; interval improvement in left inguinal lymphadenopathy   -restaging FDG PET-CT 05/20/2024:  Positive response to therapy with resolution of hypermetabolic activity along the vaginal orifice; low-level uptake within normal size left inguinal lymph node chain maybe reactive  -through chemoradiation therapy, hemoglobin dropped to 6.8 on 02/01/2024 (transfused PRBC x1 unit)  -after chemoradiation therapy, creatinine went of (1.39 on 03/07/2024; 1.15 on 02/15/2024; 1.24 on 02/01/2024, etc.)      Father experienced prostate cancer at age 70   Daughter experienced ovarian cancer at age 31    History of hysterectomy at age 37 for AUB/uterine fibroids  History of Graves disease, treated; subsequently, hypothyroid  History of tobacco abuse         Plan:  Follow-up with Dr. Harjinder Dumont, Gyn Onc regularly for  surveillance  Follow-up with us in 6 months with CBC and CMP; to visit with NP  Genetic testing (daughter experienced ovarian cancer at 71; father experienced prostate cancer at age 70)  Patient should follow-up with NP in 2 weeks with repeat CBC and CMP, to follow-up on leukopenia.  --------------------------------------------------      -05/30/2024:   Today, WBC is low, 1.75  Hemoglobin and platelets normal   Differential count is pending  >>>  Patient should follow-up with NP in 2 weeks with repeat CBC and CMP.    -squamous cell carcinoma of the vulva (left labia)  -disease involving lower 2/3 of the vagina on MRI  -left inguinal lymphadenopathy (regional lymphadenopathy), 2.0 cm on MRI pelvis, 2.4 cm on PET-CT (> 5 mm), therefore, FIGO stage IIIB  -locally advanced disease; unresectable  -radiologically suspicious nodes (left inguinal lymphadenopathy on MRI and every PET-CT)    Primary treatment:   1. Inguinofemoral lymphadenectomy; if positive lymph nodes, then, EBRT +concurrent chemotherapy to primary tumor/inguinofemoral lymph node/pelvic lymph nodes  2. Inguinofemoral lymphadenectomy; if negative lymph node, then, EBRT +concurrent chemotherapy to primary tumor (+/- selective inguinofemoral lymph node coverage)  3. If inguinofemoral lymphadenectomy not performed then:  Consider FNA for enlarged lymph nodes, followed by EBRT +concurrent chemotherapy to primary tumor/inguinofemoral lymph nodes/pelvic lymph nodes    >>>  -11/01/2023: Message from Dr. Harjinder Dumont:  He wishes to perform inguinofemoral lymphadenectomy  -therefore, we will defer chemoradiation therapy until after she heals up from surgery  -11/14/2023: Message from Dr. Harjinder Maynard's office:  Recommendation to proceed with chemoradiation therapy; if persistent disease after treatment, then, will plan surgical resection  -S/P weekly cisplatin 40 mg per m2 IV x7 (11/30/2023-01/19/2024), concurrent with radiotherapy  -S/P radiotherapy, concurrent  with chemotherapy, 11/30/2023-01/26/2024  -restaging MRI pelvis 05/13/2024:  Left vulvar mass resolved; interval improvement in left inguinal lymphadenopathy   -restaging FDG PET-CT 05/20/2024:  Positive response to therapy with resolution of hypermetabolic activity along the vaginal orifice; low-level uptake within normal size left inguinal lymph node chain maybe reactive  -through chemoradiation therapy, hemoglobin dropped to 6.8 on 02/01/2024 (transfused PRBC x1 unit)  -after chemoradiation therapy, creatinine went of (1.39 on 03/07/2024; 1.15 on 02/15/2024; 1.24 on 02/01/2024, etc.)  >>>  -excellent response to concurrent chemoradiation therapy   -now, proceed with surveillance  -for surveillance, follow-up regularly with Gyn Onc (Dr. Harjinder roberto MD)  Interval history and physical every 3-6 months for 2 years, then every 6-12 months for 3-5 years, then annually  -cervical/vaginal cytology screening for detection of lower genital tract neoplasia which may include HPV testing  -imaging as indicated based on symptoms or examination findings suspicious for recurrence  -laboratories as indicated based on symptoms or examination findings suspicious of malignancy  -patient Education regarding symptoms of potential recurrence and vulvar dystrophy, periodic self-examination, lifestyle, obesity, exercise, sexual health including vaginal dilator use and lubricant/moisturizers, smoking cessation, nutritional counseling, and potential long-term and late effects of treatment    Surveillance (after completion of treatment):  1. Interval history and physical every 3-6 months for 2 years, then every 6-12 months for 3-5 years, then annually based on patient's risk of disease recurrence  2. Cervical/vaginal cytology screening for detection of lower genital tract neoplasia (may include HPV testing)  3. Imaging as indicated based on symptoms or examination findings suspicious for recurrence  4. Laboratory assessment as indicated  based on symptoms or examination findings suspicious for recurrence  5. Patient Education regarding symptoms of potential recurrence and vulvar dystrophy, periodic self examinations, lifestyle, obesity, exercise, sexual health (including vaginal dilator use and lubricant/moisturizers), smoking cessation, nutrition counseling, and potential long-term and late effects of treatment     Daughter experienced ovarian cancer age 31  Father experienced prostate cancers anterior 70  >>>  -will consider genetic testing    History of tobacco abuse  -she discontinued smoking January 2024, just before completing chemoradiation therapy    History of Graves disease, treated with radioactive iodine in 1990 9-2000, resulting in iatrogenic hypothyroidism; subsequently, on thyroid replacement therapy   Follow-up with PCP    Patient should follow-up with NP in 2 weeks with repeat CBC and CMP, to follow-up on leukopenia.  Follow-up visit with us in 6 months with CBC and CMP  -in the meantime, continue follow-up with Dr. Harjinder Dumont, gyn onc for surveillance    Above discussed at length with the patient.  All questions answered.  Discussed labs and scans and gave her copies of relevant records.  Plan of surveillance discussed.  She understands and agrees with this plan.  ================================    Discussion:    Chemotherapy, concurrent with radiotherapy, used:  -Preferred chemotherapy:  Cisplatin   -cisplatin 40 mg per m2 IV day 1, every 7 days x7 weeks with concurrent radiotherapy    Followed by evaluation of response to EBRT +concurrent chemotherapy:   Consider FDG PET-CT at 3-6 months to assess treatment response after definitive primary treatment  1. If clinically negative for residual tumor at primary site and nodes, then: Surveillance  2. If clinically negative for residual tumor at primary site and nodes, then: Consider biopsy of tumor bed to confirm pathologic complete response (no sooner than 3 months after  completion of treatment); if biopsy negative, then, surveillance; if biopsy positive, then, resection, followed by surveillance for negative margins, and consideration of additional surgery (consider pelvic exenteration for selected cases with a central recurrence), additional EBRT and/or systemic therapy for positive margins for invasive disease   3. If clinically positive for residual tumor at primary site and/or nodes, then:  -resection, followed by surveillance for negative margins  -resection, followed by additional surgery/additional EBRT, N/or systemic therapy for positive margins for invasive disease  -if unresectable, then, consider additional EBRT and/or systemic therapy or best supportive care      Preferred chemotherapy for chemoradiation therapy:  -cisplatin  -carboplatin if patient cisplatin intolerant  -other recommended regimens: Cisplatin/5 FU; capecitabine/mitomycin; gemcitabine; paclitaxel    Imagin. Initial workup:  Chest x-ray/chest CT without contrast; pelvic MRI to aid in surgical and/or radiation treatment planning; consider neck/chest/abdomen/pelvis/groin FDG PET-CT or CT C/A/P with contrast for T2 or larger tumors or metastases suspected, etc.    Follow-up/surveillance imaging:  CT C/A/P with contrast or neck/chest/abdomen/pelvis/groin FDG PET-CT if recurrence/metastases suspected  Consider FDG PET-CT at 3-6 months to assess treatment response after definitive primary treatment    Imaging for suspected or documented recurrence:   Consider neck/chest/abdomen/pelvis/groin FDG PET-CT if not previously performed during surveillance  Consider pelvic MRI to aid in further treatment planning    Follow-up:  No follow-ups on file.   Doxycycline Counseling:  Patient counseled regarding possible photosensitivity and increased risk for sunburn.  Patient instructed to avoid sunlight, if possible.  When exposed to sunlight, patients should wear protective clothing, sunglasses, and sunscreen.  The patient was instructed to call the office immediately if the following severe adverse effects occur:  hearing changes, easy bruising/bleeding, severe headache, or vision changes.  The patient verbalized understanding of the proper use and possible adverse effects of doxycycline.  All of the patient's questions and concerns were addressed.

## 2024-05-30 NOTE — Clinical Note
Follow-up with Dr. DEL REAL M1, Gyn Onc regularly for surveillance Follow-up with us in 6 months with CBC and CMP; to visit with NP Genetic testing (daughter experienced ovarian cancer at 71; father experienced prostate cancer at age 70)

## 2024-05-30 NOTE — Clinical Note
Today, WBC is low, 1.75 Hemoglobin and platelets normal  Differential count is pending  Patient should follow-up with NP in 2 weeks with repeat CBC and CMP.

## 2024-06-12 ENCOUNTER — TELEPHONE (OUTPATIENT)
Dept: HEMATOLOGY/ONCOLOGY | Facility: CLINIC | Age: 59
End: 2024-06-12
Payer: MEDICAID

## 2024-06-12 DIAGNOSIS — C51.9 SQUAMOUS CELL CARCINOMA OF VULVA: Primary | ICD-10-CM

## 2024-06-13 ENCOUNTER — APPOINTMENT (OUTPATIENT)
Dept: HEMATOLOGY/ONCOLOGY | Facility: CLINIC | Age: 59
End: 2024-06-13
Payer: MEDICAID

## 2024-06-13 ENCOUNTER — OFFICE VISIT (OUTPATIENT)
Dept: HEMATOLOGY/ONCOLOGY | Facility: CLINIC | Age: 59
End: 2024-06-13
Payer: MEDICAID

## 2024-06-13 VITALS
BODY MASS INDEX: 23.77 KG/M2 | DIASTOLIC BLOOD PRESSURE: 79 MMHG | HEART RATE: 60 BPM | SYSTOLIC BLOOD PRESSURE: 135 MMHG | OXYGEN SATURATION: 99 % | WEIGHT: 129.19 LBS | RESPIRATION RATE: 20 BRPM | HEIGHT: 62 IN | TEMPERATURE: 99 F

## 2024-06-13 DIAGNOSIS — C51.9 SQUAMOUS CELL CARCINOMA OF VULVA: Primary | ICD-10-CM

## 2024-06-13 DIAGNOSIS — Z90.710 HISTORY OF HYSTERECTOMY: ICD-10-CM

## 2024-06-13 DIAGNOSIS — C51.9 SQUAMOUS CELL CARCINOMA OF VULVA: ICD-10-CM

## 2024-06-13 DIAGNOSIS — Z72.0 TOBACCO ABUSE: ICD-10-CM

## 2024-06-13 DIAGNOSIS — R59.0 INGUINAL LYMPHADENOPATHY: ICD-10-CM

## 2024-06-13 DIAGNOSIS — Z86.39 HISTORY OF GRAVES' DISEASE: ICD-10-CM

## 2024-06-13 LAB
ALBUMIN SERPL-MCNC: 3.4 G/DL (ref 3.5–5)
ALBUMIN/GLOB SERPL: 1 RATIO (ref 1.1–2)
ALP SERPL-CCNC: 75 UNIT/L (ref 40–150)
ALT SERPL-CCNC: 7 UNIT/L (ref 0–55)
ANION GAP SERPL CALC-SCNC: 9 MEQ/L
AST SERPL-CCNC: 12 UNIT/L (ref 5–34)
BASOPHILS # BLD AUTO: 0.04 X10(3)/MCL
BASOPHILS NFR BLD AUTO: 1 %
BILIRUB SERPL-MCNC: 0.4 MG/DL
BUN SERPL-MCNC: 17.6 MG/DL (ref 9.8–20.1)
CALCIUM SERPL-MCNC: 9.6 MG/DL (ref 8.4–10.2)
CHLORIDE SERPL-SCNC: 107 MMOL/L (ref 98–107)
CO2 SERPL-SCNC: 24 MMOL/L (ref 22–29)
CREAT SERPL-MCNC: 1.2 MG/DL (ref 0.55–1.02)
CREAT/UREA NIT SERPL: 15
EOSINOPHIL # BLD AUTO: 0.16 X10(3)/MCL (ref 0–0.9)
EOSINOPHIL NFR BLD AUTO: 3.9 %
ERYTHROCYTE [DISTWIDTH] IN BLOOD BY AUTOMATED COUNT: 13.4 % (ref 11.5–17)
GFR SERPLBLD CREATININE-BSD FMLA CKD-EPI: 53 ML/MIN/1.73/M2
GLOBULIN SER-MCNC: 3.3 GM/DL (ref 2.4–3.5)
GLUCOSE SERPL-MCNC: 116 MG/DL (ref 74–100)
HCT VFR BLD AUTO: 34.3 % (ref 37–47)
HGB BLD-MCNC: 11.7 G/DL (ref 12–16)
IMM GRANULOCYTES # BLD AUTO: 0.02 X10(3)/MCL (ref 0–0.04)
IMM GRANULOCYTES NFR BLD AUTO: 0.5 %
LYMPHOCYTES # BLD AUTO: 0.72 X10(3)/MCL (ref 0.6–4.6)
LYMPHOCYTES NFR BLD AUTO: 17.3 %
MCH RBC QN AUTO: 33.6 PG (ref 27–31)
MCHC RBC AUTO-ENTMCNC: 34.1 G/DL (ref 33–36)
MCV RBC AUTO: 98.6 FL (ref 80–94)
MONOCYTES # BLD AUTO: 0.45 X10(3)/MCL (ref 0.1–1.3)
MONOCYTES NFR BLD AUTO: 10.8 %
NEUTROPHILS # BLD AUTO: 2.76 X10(3)/MCL (ref 2.1–9.2)
NEUTROPHILS NFR BLD AUTO: 66.5 %
NRBC BLD AUTO-RTO: 0 %
PLATELET # BLD AUTO: 233 X10(3)/MCL (ref 130–400)
PMV BLD AUTO: 8.9 FL (ref 7.4–10.4)
POTASSIUM SERPL-SCNC: 4.2 MMOL/L (ref 3.5–5.1)
PROT SERPL-MCNC: 6.7 GM/DL (ref 6.4–8.3)
RBC # BLD AUTO: 3.48 X10(6)/MCL (ref 4.2–5.4)
SODIUM SERPL-SCNC: 140 MMOL/L (ref 136–145)
WBC # SPEC AUTO: 4.15 X10(3)/MCL (ref 4.5–11.5)

## 2024-06-13 PROCEDURE — 80053 COMPREHEN METABOLIC PANEL: CPT

## 2024-06-13 PROCEDURE — 3078F DIAST BP <80 MM HG: CPT | Mod: CPTII,,, | Performed by: NURSE PRACTITIONER

## 2024-06-13 PROCEDURE — 1160F RVW MEDS BY RX/DR IN RCRD: CPT | Mod: CPTII,,, | Performed by: NURSE PRACTITIONER

## 2024-06-13 PROCEDURE — 3008F BODY MASS INDEX DOCD: CPT | Mod: CPTII,,, | Performed by: NURSE PRACTITIONER

## 2024-06-13 PROCEDURE — 36415 COLL VENOUS BLD VENIPUNCTURE: CPT

## 2024-06-13 PROCEDURE — 3075F SYST BP GE 130 - 139MM HG: CPT | Mod: CPTII,,, | Performed by: NURSE PRACTITIONER

## 2024-06-13 PROCEDURE — 85025 COMPLETE CBC W/AUTO DIFF WBC: CPT

## 2024-06-13 PROCEDURE — 4010F ACE/ARB THERAPY RXD/TAKEN: CPT | Mod: CPTII,,, | Performed by: NURSE PRACTITIONER

## 2024-06-13 PROCEDURE — 99214 OFFICE O/P EST MOD 30 MIN: CPT | Mod: S$PBB,,, | Performed by: NURSE PRACTITIONER

## 2024-06-13 PROCEDURE — 99214 OFFICE O/P EST MOD 30 MIN: CPT | Mod: PBBFAC | Performed by: NURSE PRACTITIONER

## 2024-06-13 PROCEDURE — 1159F MED LIST DOCD IN RCRD: CPT | Mod: CPTII,,, | Performed by: NURSE PRACTITIONER

## 2024-06-13 NOTE — PROGRESS NOTES
Reason for Follow-up:  Chemotherapy teaching Cisplatin for Squamous Cell Carcinoma of Vulva    Current Treatment:  -cisplatin 40 mg per m2 IV day 1, every 7 days x7 weeks with concurrent radiotherapy  start 11/30/23    Treatment Modificatons:  1/4/24: Week 7 held due to   1/11/24: Week 8 held due to     Treatment History:  Past medical history:  Rasheeda-Parkinson-White syndrome.  Graves disease, treated with radioactive iodine in 1999 or 2000, resulting in iatrogenic hypothyroidism; subsequently, on thyroid replacement therapy.  Hypertension.  Anxiety.  Tobacco abuse.  History of abnormal Pap smears in the past; no regular follow-up.  According to her, history of abnormal Pap smears, HPV 16 positive    Surgical/procedure history:  Hysterectomy for AUB/fibroids at age 37.     Social history:  Single.  Has 3 children.  Lives in [place], [place].  Used to work as a ; does not work anymore.  Has been smoking < half a pack of cigarettes daily for 10-15 years.  Used to drink 3 beers 3 X a week; now, down to or 3 beers once a week.  No illicit drugs.    Family history:  Daughter experienced ovarian cancer at age 31.    Health maintenance:  PCP in [place], [place].  -04/25/2023: Bilateral screening mammogram (comparison:  None):  BI-RADS 1-negative  -no screening colonoscopy ever.    Gyn history:  Pregnancy x3.  Vaginal birth x3.  Hysterectomy for AUB/fibroids at age 37.      History of Present Illness:     Oncologic/Hematologic History:  Oncology History   Squamous cell carcinoma of vulva   10/31/2023 Initial Diagnosis    Squamous cell carcinoma of vulva     10/31/2023 Cancer Staged    Staging form: Vulva, AJCC 8th Edition  - Clinical stage from 10/31/2023: FIGO Stage IIIB, calculated as Stage LINH (cT3, cN2b, cM0)     11/30/2023 - 1/19/2024 Chemotherapy    Treatment Summary   Plan Name: OP GYN CISPLATIN WEEKLY  Treatment Goal: Curative  Status: Inactive  Start Date: 11/30/2023  End Date:  1/19/2024  Provider: Wai Verduzco MD  Chemotherapy: CISplatin (Platinol) 40 mg/m2 = 64 mg in sodium chloride 0.9% 629 mL chemo infusion, 40 mg/m2 = 64 mg, Intravenous, Clinic/HOD 1 time, 6 of 6 cycles  Dose modification: 32 mg/m2 (80 % of original dose 40 mg/m2, Cycle 7, Reason: Treatment Parameters Not Met)  Administration: 64 mg (11/30/2023), 50 mg (1/19/2024), 60 mg (12/7/2023), 63 mg (12/14/2023), 63 mg (12/21/2023), 63 mg (12/28/2023)     58-year-old lady, referred by Dr. Harjinder Dumont, gyn onc, [facility], [place], [place], with squamous cell carcinoma of vulva.    Interval History 6/13/2024: Patient presents to clinic alone for scheduled follow-up surveillance visit for history of squamous cell carcinoma of the vulva and 2 follow up on leukopenia per lab work on 302024.  Patient reports doing well without any significant reportable symptoms.  She denies fever or signs of infection. Lab work reviewed with patient slightly elevated serum creatinine level.  Leukopenia has resolved.  Patient admits that she has not drink very much water but will improve.  Discussed plan of care and follow-up.    Review of Systems:   Review of Systems   Respiratory:  Negative for cough and shortness of breath.    Gastrointestinal:  Negative for abdominal pain and diarrhea.   Genitourinary:  Negative for frequency.   Musculoskeletal:  Negative for back pain.   Neurological:  Negative for headaches.   All other systems reviewed and are negative.      Physical Exam  Constitutional:       Appearance: Normal appearance.   HENT:      Head: Normocephalic.      Mouth/Throat:      Mouth: Mucous membranes are moist.   Eyes:      Conjunctiva/sclera: Conjunctivae normal.      Pupils: Pupils are equal, round, and reactive to light.   Cardiovascular:      Rate and Rhythm: Normal rate and regular rhythm.   Pulmonary:      Breath sounds: Normal breath sounds.   Abdominal:      General: Bowel sounds are normal.       Palpations: Abdomen is soft.   Musculoskeletal:      Cervical back: Normal range of motion.   Skin:     General: Skin is warm and dry.   Neurological:      Mental Status: She is alert and oriented to person, place, and time.   Psychiatric:         Mood and Affect: Mood normal.       Vitals:    06/13/24 1353   BP: 135/79   Pulse: 60   Resp: 20   Temp: 98.7 °F (37.1 °C)     Lab Results   Component Value Date    WBC 4.15 (L) 06/13/2024    RBC 3.48 (L) 06/13/2024    HGB 11.7 (L) 06/13/2024    HCT 34.3 (L) 06/13/2024    MCV 98.6 (H) 06/13/2024    MCH 33.6 (H) 06/13/2024    MCHC 34.1 06/13/2024    RDW 13.4 06/13/2024     06/13/2024    MPV 8.9 06/13/2024    EOS 0.2 02/23/2023    BASO 0.1 02/23/2023    EOSINOPHIL 4 02/23/2023   CMP  Sodium   Date Value Ref Range Status   06/13/2024 140 136 - 145 mmol/L Final   09/18/2021 138 136 - 145 mmol/L Final     Potassium   Date Value Ref Range Status   06/13/2024 4.2 3.5 - 5.1 mmol/L Final   08/24/2023 5.1 3.5 - 5.2 mmol/L Final     Chloride   Date Value Ref Range Status   06/13/2024 107 98 - 107 mmol/L Final   09/18/2021 103 100 - 109 mmol/L Final     CO2   Date Value Ref Range Status   06/13/2024 24 22 - 29 mmol/L Final     Carbon Dioxide   Date Value Ref Range Status   09/18/2021 25 22 - 33 mmol/L Final     Blood Urea Nitrogen   Date Value Ref Range Status   06/13/2024 17.6 9.8 - 20.1 mg/dL Final   09/18/2021 15 5 - 25 mg/dL Final     Creatinine   Date Value Ref Range Status   06/13/2024 1.20 (H) 0.55 - 1.02 mg/dL Final   09/18/2021 0.96 0.57 - 1.25 mg/dL Final     Calcium   Date Value Ref Range Status   06/13/2024 9.6 8.4 - 10.2 mg/dL Final   09/18/2021 9.2 8.8 - 10.6 mg/dL Final     Albumin   Date Value Ref Range Status   06/13/2024 3.4 (L) 3.5 - 5.0 g/dL Final     Bilirubin Total   Date Value Ref Range Status   06/13/2024 0.4 <=1.5 mg/dL Final     ALP   Date Value Ref Range Status   06/13/2024 75 40 - 150 unit/L Final     AST   Date Value Ref Range Status   06/13/2024  12 5 - 34 unit/L Final     ALT   Date Value Ref Range Status   06/13/2024 7 0 - 55 unit/L Final     Anion Gap   Date Value Ref Range Status   09/18/2021 10 8 - 16 mmol/L Final     eGFR   Date Value Ref Range Status   06/13/2024 53 mL/min/1.73/m2 Final     Assessment:  Squamous cell carcinoma of vulva:  -presentation:  Left vulvar painful, tender mass for 1 year before seeking care in 09/2023  -vulvar biopsy 09/21/2023: Squamous cell carcinoma, extending to the deep and peripheral margins  -gyn exam 09/15/2023:  Friable, atypical borders, tender, entire left labia replaced by tumor/condylomatous appearance in several areas; left inguinal lymphadenopathy  -Gyn Onc exam 09/29/2023:  Very large vulvar mass involving the mid and posterior left labial structures with areas of ulceration, etc.; no fixation; distal vagina involved; involve the left anterior portion of the anus as well; no extension into deeper tissue; 8 cm x 5 cm;  -MRI pelvis with and without contrast 10/20/2023:  Left vulvar mass involving the left perineum and lower 2/3 of the vagina; 8.0 x 2.5 x 5.4 cm; no urethral invasion; abutment of the anterior rectal wall is indeterminate for invasion; left inguinal lymphadenopathy (2 abnormal lymph nodes, largest 2.0 cm; a 3rd lymph node similar appearance nonenlarged)  -PET-CT 10/31/2023:  No distant metastases; no intrapelvic lymphadenopathy; 2.4 cm hypermetabolic left inguinal lymph node; additionally, hypermetabolic lymph node adjacent to it as well; primary tumor, hypermetabolic, involving a portion of the vaginal canal)  To summarize:  -squamous cell carcinoma of the vulva (left labia)  -disease involving lower 2/3 of the vagina on MRI  -left inguinal lymphadenopathy (regional lymphadenopathy), 2.0 cm on MRI pelvis, 2.4 cm on PET-CT (> 5 mm), therefore, FIGO stage IIIB  -11/01/2023: CBC normal; hemoglobin 13.7; CMP unremarkable; HIV nonreactive  -11/14/2023: Message from Dr. Harjinder Maynard's office:   Recommendation to proceed with chemoradiation therapy; if persistent disease after treatment, then, will plan surgical resection  -S/P weekly cisplatin 40 mg per m2 IV x7 (11/30/2023-01/19/2024), concurrent with radiotherapy  -S/P radiotherapy, concurrent with chemotherapy, 11/30/2023-01/26/2024  -restaging MRI pelvis 05/13/2024:  Left vulvar mass resolved; interval improvement in left inguinal lymphadenopathy   -restaging FDG PET-CT 05/20/2024:  Positive response to therapy with resolution of hypermetabolic activity along the vaginal orifice; low-level uptake within normal size left inguinal lymph node chain maybe reactive  -through chemoradiation therapy, hemoglobin dropped to 6.8 on 02/01/2024 (transfused PRBC x1 unit)  -after chemoradiation therapy, creatinine went of (1.39 on 03/07/2024; 1.15 on 02/15/2024; 1.24 on 02/01/2024, etc.)    Father experienced prostate cancer at age 70   Daughter experienced ovarian cancer at age 31    History of hysterectomy at age 37 for AUB/uterine fibroids  History of Graves disease, treated; subsequently, hypothyroid  History of tobacco abuse       Plan:  Squamous cell carcinoma of vulva  -squamous cell carcinoma of the vulva (left labia)  -disease involving lower 2/3 of the vagina on MRI  -left inguinal lymphadenopathy (regional lymphadenopathy), 2.0 cm on MRI pelvis, 2.4 cm on PET-CT (> 5 mm), therefore, FIGO stage IIIB  -locally advanced disease; unresectable  -radiologically suspicious nodes (left inguinal lymphadenopathy on MRI and every PET-CT)    Primary treatment:   1. Inguinofemoral lymphadenectomy; if positive lymph nodes, then, EBRT +concurrent chemotherapy to primary tumor/inguinofemoral lymph node/pelvic lymph nodes  2. Inguinofemoral lymphadenectomy; if negative lymph node, then, EBRT +concurrent chemotherapy to primary tumor (+/- selective inguinofemoral lymph node coverage)  3. If inguinofemoral lymphadenectomy not performed then:  Consider FNA for enlarged lymph  nodes, followed by EBRT +concurrent chemotherapy to primary tumor/inguinofemoral lymph nodes/pelvic lymph nodes    >>>  -11/01/2023: Message from Dr. Harjinder Dumont:  He wishes to perform inguinofemoral lymphadenectomy  -therefore, we will defer chemoradiation therapy until after she heals up from surgery  -11/14/2023: Message from Dr. Harjinder Maynard's office:  Recommendation to proceed with chemoradiation therapy; if persistent disease after treatment, then, will plan surgical resection  >>>    Followed by evaluation of response to EBRT +concurrent chemotherapy:   Consider FDG PET-CT at 3-6 months to assess treatment response after definitive primary treatment  1. If clinically negative for residual tumor at primary site and nodes, then: Surveillance  2. If clinically negative for residual tumor at primary site and nodes, then: Consider biopsy of tumor bed to confirm pathologic complete response (no sooner than 3 months after completion of treatment); if biopsy negative, then, surveillance; if biopsy positive, then, resection, followed by surveillance for negative margins, and consideration of additional surgery (consider pelvic exenteration for selected cases with a central recurrence), additional EBRT and/or systemic therapy for positive margins for invasive disease   3. If clinically positive for residual tumor at primary site and/or nodes, then:  -resection, followed by surveillance for negative margins  -resection, followed by additional surgery/additional EBRT, N/or systemic therapy for positive margins for invasive disease  -if unresectable, then, consider additional EBRT and/or systemic therapy or best supportive care      Surveillance (after completion of treatment):  1. Interval history and physical every 3-6 months for 2 years, then every 6-12 months for 3-5 years, then annually based on patient's risk of disease recurrence  2. Cervical/vaginal cytology screening for detection of lower genital tract  neoplasia (may include HPV testing)  3. Imaging as indicated based on symptoms or examination findings suspicious for recurrence  4. Laboratory assessment as indicated based on symptoms or examination findings suspicious for recurrence  5. Patient Education regarding symptoms of potential recurrence and vulvar dystrophy, periodic self examinations, lifestyle, obesity, exercise, 6 health (including vaginal dilator use and lubricant/moisturizers), smoking cessation, nutrition counseling, and potential long-term and late effects of treatment     Preferred chemotherapy for chemoradiation therapy:  -cisplatin  -carboplatin if patient cisplatin intolerant  -other recommended regimens: Cisplatin/5 FU; capecitabine/mitomycin; gemcitabine; paclitaxel    -proceed with EBRT +concurrent chemotherapy to primary tumor/inguinofemoral lymph node/pelvic lymph nodes  -Preferred chemotherapy:  Cisplatin   -cisplatin 40 mg per m2 IV day 1, every 7 days x7 weeks with concurrent radiotherapy    -follow-up with NP in 3 months with PET-CT scan results and lab work (CBC, CMP, Mg)  -continue regular follow up with Dr. Harjinder Dumont gyn Oncology for surveillance  -Genetic testing because daughter experienced ovarian cancer at age 31-Scheduled 9/9/2024  --------------------------------      Follow-up/surveillance imaging:  CT C/A/P with contrast or neck/chest/abdomen/pelvis/groin FDG PET-CT if recurrence/metastases suspected  Consider FDG PET-CT at 3-6 months to assess treatment response after definitive primary treatment    Imaging for suspected or documented recurrence:   Consider neck/chest/abdomen/pelvis/groin FDG PET-CT if not previously performed during surveillance  Consider pelvic MRI to aid in further treatment planning    History of tobacco abuse  -importance of complete and permanent abstinence discussed and strongly emphasized  Patient quit smoking 1/9/2024    History of Graves disease  -treated with radioactive iodine in 1990  9-2000, resulting in iatrogenic hypothyroidism; subsequently, on thyroid replacement therapy   Follow-up with PCP    Above discussed at length with the patient.  All questions answered.  Plan of management discussed in detail.  Potential side effects of cisplatin discussed.  She understands and agrees with this plan.

## 2024-09-27 ENCOUNTER — TELEPHONE (OUTPATIENT)
Dept: HEMATOLOGY/ONCOLOGY | Facility: CLINIC | Age: 59
End: 2024-09-27
Payer: MEDICAID

## 2024-09-30 ENCOUNTER — APPOINTMENT (OUTPATIENT)
Dept: HEMATOLOGY/ONCOLOGY | Facility: CLINIC | Age: 59
End: 2024-09-30
Payer: MEDICAID

## 2024-09-30 ENCOUNTER — OFFICE VISIT (OUTPATIENT)
Dept: HEMATOLOGY/ONCOLOGY | Facility: CLINIC | Age: 59
End: 2024-09-30
Payer: MEDICAID

## 2024-09-30 VITALS
BODY MASS INDEX: 24.22 KG/M2 | HEIGHT: 62 IN | DIASTOLIC BLOOD PRESSURE: 80 MMHG | TEMPERATURE: 98 F | SYSTOLIC BLOOD PRESSURE: 130 MMHG | HEART RATE: 60 BPM | OXYGEN SATURATION: 100 % | WEIGHT: 131.63 LBS

## 2024-09-30 DIAGNOSIS — C51.9 SQUAMOUS CELL CARCINOMA OF VULVA: ICD-10-CM

## 2024-09-30 DIAGNOSIS — Z90.710 HISTORY OF HYSTERECTOMY: ICD-10-CM

## 2024-09-30 DIAGNOSIS — R10.2 PAIN IN VULVA: ICD-10-CM

## 2024-09-30 DIAGNOSIS — Z72.0 TOBACCO ABUSE: ICD-10-CM

## 2024-09-30 DIAGNOSIS — Z80.41 FAMILY HISTORY OF OVARIAN CANCER: ICD-10-CM

## 2024-09-30 DIAGNOSIS — R59.0 INGUINAL LYMPHADENOPATHY: ICD-10-CM

## 2024-09-30 DIAGNOSIS — C51.9 SQUAMOUS CELL CARCINOMA OF VULVA: Primary | ICD-10-CM

## 2024-09-30 DIAGNOSIS — Z86.39 HISTORY OF GRAVES' DISEASE: ICD-10-CM

## 2024-09-30 LAB
ALBUMIN SERPL-MCNC: 3.7 G/DL (ref 3.5–5)
ALBUMIN/GLOB SERPL: 1.1 RATIO (ref 1.1–2)
ALP SERPL-CCNC: 78 UNIT/L (ref 40–150)
ALT SERPL-CCNC: 8 UNIT/L (ref 0–55)
ANION GAP SERPL CALC-SCNC: 7 MEQ/L
AST SERPL-CCNC: 12 UNIT/L (ref 5–34)
BASOPHILS # BLD AUTO: 0.04 X10(3)/MCL
BASOPHILS NFR BLD AUTO: 0.8 %
BILIRUB SERPL-MCNC: 0.4 MG/DL
BUN SERPL-MCNC: 15.2 MG/DL (ref 9.8–20.1)
CALCIUM SERPL-MCNC: 9.9 MG/DL (ref 8.4–10.2)
CHLORIDE SERPL-SCNC: 108 MMOL/L (ref 98–107)
CO2 SERPL-SCNC: 25 MMOL/L (ref 22–29)
CREAT SERPL-MCNC: 1.17 MG/DL (ref 0.55–1.02)
CREAT/UREA NIT SERPL: 13
EOSINOPHIL # BLD AUTO: 0.12 X10(3)/MCL (ref 0–0.9)
EOSINOPHIL NFR BLD AUTO: 2.5 %
ERYTHROCYTE [DISTWIDTH] IN BLOOD BY AUTOMATED COUNT: 13 % (ref 11.5–17)
GFR SERPLBLD CREATININE-BSD FMLA CKD-EPI: 54 ML/MIN/1.73/M2
GLOBULIN SER-MCNC: 3.3 GM/DL (ref 2.4–3.5)
GLUCOSE SERPL-MCNC: 107 MG/DL (ref 74–100)
HCT VFR BLD AUTO: 40 % (ref 37–47)
HGB BLD-MCNC: 13.7 G/DL (ref 12–16)
IMM GRANULOCYTES # BLD AUTO: 0.03 X10(3)/MCL (ref 0–0.04)
IMM GRANULOCYTES NFR BLD AUTO: 0.6 %
LYMPHOCYTES # BLD AUTO: 0.88 X10(3)/MCL (ref 0.6–4.6)
LYMPHOCYTES NFR BLD AUTO: 18.4 %
MAGNESIUM SERPL-MCNC: 2 MG/DL (ref 1.6–2.6)
MCH RBC QN AUTO: 33.4 PG (ref 27–31)
MCHC RBC AUTO-ENTMCNC: 34.3 G/DL (ref 33–36)
MCV RBC AUTO: 97.6 FL (ref 80–94)
MONOCYTES # BLD AUTO: 0.53 X10(3)/MCL (ref 0.1–1.3)
MONOCYTES NFR BLD AUTO: 11.1 %
NEUTROPHILS # BLD AUTO: 3.19 X10(3)/MCL (ref 2.1–9.2)
NEUTROPHILS NFR BLD AUTO: 66.6 %
NRBC BLD AUTO-RTO: 0 %
PLATELET # BLD AUTO: 267 X10(3)/MCL (ref 130–400)
PMV BLD AUTO: 8.6 FL (ref 7.4–10.4)
POTASSIUM SERPL-SCNC: 4.2 MMOL/L (ref 3.5–5.1)
PROT SERPL-MCNC: 7 GM/DL (ref 6.4–8.3)
RBC # BLD AUTO: 4.1 X10(6)/MCL (ref 4.2–5.4)
SODIUM SERPL-SCNC: 140 MMOL/L (ref 136–145)
WBC # BLD AUTO: 4.79 X10(3)/MCL (ref 4.5–11.5)

## 2024-09-30 PROCEDURE — 80053 COMPREHEN METABOLIC PANEL: CPT

## 2024-09-30 PROCEDURE — 99213 OFFICE O/P EST LOW 20 MIN: CPT | Mod: PBBFAC | Performed by: NURSE PRACTITIONER

## 2024-09-30 PROCEDURE — 3075F SYST BP GE 130 - 139MM HG: CPT | Mod: CPTII,,, | Performed by: NURSE PRACTITIONER

## 2024-09-30 PROCEDURE — 4010F ACE/ARB THERAPY RXD/TAKEN: CPT | Mod: CPTII,,, | Performed by: NURSE PRACTITIONER

## 2024-09-30 PROCEDURE — 85025 COMPLETE CBC W/AUTO DIFF WBC: CPT

## 2024-09-30 PROCEDURE — 3079F DIAST BP 80-89 MM HG: CPT | Mod: CPTII,,, | Performed by: NURSE PRACTITIONER

## 2024-09-30 PROCEDURE — 1160F RVW MEDS BY RX/DR IN RCRD: CPT | Mod: CPTII,,, | Performed by: NURSE PRACTITIONER

## 2024-09-30 PROCEDURE — 83735 ASSAY OF MAGNESIUM: CPT

## 2024-09-30 PROCEDURE — 1159F MED LIST DOCD IN RCRD: CPT | Mod: CPTII,,, | Performed by: NURSE PRACTITIONER

## 2024-09-30 PROCEDURE — 99214 OFFICE O/P EST MOD 30 MIN: CPT | Mod: S$PBB,,, | Performed by: NURSE PRACTITIONER

## 2024-09-30 PROCEDURE — 3008F BODY MASS INDEX DOCD: CPT | Mod: CPTII,,, | Performed by: NURSE PRACTITIONER

## 2024-09-30 PROCEDURE — 36415 COLL VENOUS BLD VENIPUNCTURE: CPT

## 2024-09-30 NOTE — PROGRESS NOTES
Reason for Follow-up:  Chemotherapy teaching Cisplatin for Squamous Cell Carcinoma of Vulva    Current Treatment:  -cisplatin 40 mg per m2 IV day 1, every 7 days x7 weeks with concurrent radiotherapy  start 11/30/23    Treatment Modificatons:  1/4/24: Week 7 held due to   1/11/24: Week 8 held due to     Treatment History:  Past medical history:  Rasheeda-Parkinson-White syndrome.  Graves disease, treated with radioactive iodine in 1999 or 2000, resulting in iatrogenic hypothyroidism; subsequently, on thyroid replacement therapy.  Hypertension.  Anxiety.  Tobacco abuse.  History of abnormal Pap smears in the past; no regular follow-up.  According to her, history of abnormal Pap smears, HPV 16 positive    Surgical/procedure history:  Hysterectomy for AUB/fibroids at age 37.     Social history:  Single.  Has 3 children.  Lives in [place], [place].  Used to work as a ; does not work anymore.  Has been smoking < half a pack of cigarettes daily for 10-15 years.  Used to drink 3 beers 3 X a week; now, down to or 3 beers once a week.  No illicit drugs.    Family history:  Daughter experienced ovarian cancer at age 31.    Health maintenance:  PCP in [place], [place].  -04/25/2023: Bilateral screening mammogram (comparison:  None):  BI-RADS 1-negative  -no screening colonoscopy ever.    Gyn history:  Pregnancy x3.  Vaginal birth x3.  Hysterectomy for AUB/fibroids at age 37.      History of Present Illness:     Oncologic/Hematologic History:  Oncology History   Squamous cell carcinoma of vulva   10/31/2023 Initial Diagnosis    Squamous cell carcinoma of vulva     10/31/2023 Cancer Staged    Staging form: Vulva, AJCC 8th Edition  - Clinical stage from 10/31/2023: FIGO Stage IIIB, calculated as Stage LINH (cT3, cN2b, cM0)     11/30/2023 - 1/19/2024 Chemotherapy    Treatment Summary   Plan Name: OP GYN CISPLATIN WEEKLY  Treatment Goal: Curative  Status: Inactive  Start Date: 11/30/2023  End Date:  1/19/2024  Provider: Wai Verduzco MD  Chemotherapy: CISplatin (Platinol) 40 mg/m2 = 64 mg in sodium chloride 0.9% 629 mL chemo infusion, 40 mg/m2 = 64 mg, Intravenous, Clinic/HOD 1 time, 6 of 6 cycles  Dose modification: 32 mg/m2 (80 % of original dose 40 mg/m2, Cycle 7, Reason: Treatment Parameters Not Met)  Administration: 64 mg (11/30/2023), 50 mg (1/19/2024), 60 mg (12/7/2023), 63 mg (12/14/2023), 63 mg (12/21/2023), 63 mg (12/28/2023)     58-year-old lady, referred by Dr. Harjinder Dumont, gyn onc, [facility], [place], [place], with squamous cell carcinoma of vulva.    Interval History 9/30/2024: Patient presents to clinic alone for scheduled follow-up surveillance visit for history of squamous cell carcinoma of the vulva . Patient reports doing well without any significant reportable symptoms. Patient does admit to chronic stable symptoms of neuropathy and fatigue. Patient denies any abnormal vaginal bleeding, vaginal lesions, LE swelling, abdominal/pelvic pain, unintentional weight loss, dyspnea, lower back pain, changes in bowel/bladder patterns, fever or any signs of infection. Lab work reviewed with patient, stable. Patient says she does still continue to have some pain duing intercourse due to dryness. Patient says she seems to believe pain is mostly related to anxiety in anticiaption of pain due to prior vaginal pain throughout radiation. Patient admits to regular follow up visits with GYN Oncology. She is aware of upcoming genetic testing. We discussed plan of care and follow up.     Review of Systems:   Review of Systems   Constitutional:  Positive for malaise/fatigue.   Respiratory:  Negative for shortness of breath.    Musculoskeletal:  Negative for back pain.   Neurological:  Positive for tingling.   All other systems reviewed and are negative.      Physical Exam  Constitutional:       Appearance: Normal appearance.   HENT:      Head: Normocephalic.      Mouth/Throat:       Mouth: Mucous membranes are moist.   Eyes:      Conjunctiva/sclera: Conjunctivae normal.      Pupils: Pupils are equal, round, and reactive to light.   Cardiovascular:      Rate and Rhythm: Normal rate and regular rhythm.   Pulmonary:      Breath sounds: Normal breath sounds.   Abdominal:      General: Bowel sounds are normal.      Palpations: Abdomen is soft.   Musculoskeletal:      Cervical back: Normal range of motion.   Skin:     General: Skin is warm and dry.   Neurological:      Mental Status: She is alert and oriented to person, place, and time.   Psychiatric:         Mood and Affect: Mood normal.       Vitals:    09/30/24 1315   BP: 130/80   Pulse: 60   Temp: 97.9 °F (36.6 °C)     Lab Results   Component Value Date    WBC 4.79 09/30/2024    RBC 4.10 (L) 09/30/2024    HGB 13.7 09/30/2024    HCT 40.0 09/30/2024    MCV 97.6 (H) 09/30/2024    MCH 33.4 (H) 09/30/2024    MCHC 34.3 09/30/2024    RDW 13.0 09/30/2024     09/30/2024    MPV 8.6 09/30/2024    EOS 0.2 02/23/2023    BASO 0.1 02/23/2023    EOSINOPHIL 4 02/23/2023   CMP  Sodium   Date Value Ref Range Status   09/30/2024 140 136 - 145 mmol/L Final   09/18/2021 138 136 - 145 mmol/L Final     Potassium   Date Value Ref Range Status   09/30/2024 4.2 3.5 - 5.1 mmol/L Final   08/24/2023 5.1 3.5 - 5.2 mmol/L Final     Chloride   Date Value Ref Range Status   09/30/2024 108 (H) 98 - 107 mmol/L Final   09/18/2021 103 100 - 109 mmol/L Final     CO2   Date Value Ref Range Status   09/30/2024 25 22 - 29 mmol/L Final     Carbon Dioxide   Date Value Ref Range Status   09/18/2021 25 22 - 33 mmol/L Final     Blood Urea Nitrogen   Date Value Ref Range Status   09/30/2024 15.2 9.8 - 20.1 mg/dL Final   09/18/2021 15 5 - 25 mg/dL Final     Creatinine   Date Value Ref Range Status   09/30/2024 1.17 (H) 0.55 - 1.02 mg/dL Final   09/18/2021 0.96 0.57 - 1.25 mg/dL Final     Calcium   Date Value Ref Range Status   09/30/2024 9.9 8.4 - 10.2 mg/dL Final   09/18/2021 9.2  8.8 - 10.6 mg/dL Final     Albumin   Date Value Ref Range Status   09/30/2024 3.7 3.5 - 5.0 g/dL Final     Bilirubin Total   Date Value Ref Range Status   09/30/2024 0.4 <=1.5 mg/dL Final     ALP   Date Value Ref Range Status   09/30/2024 78 40 - 150 unit/L Final     AST   Date Value Ref Range Status   09/30/2024 12 5 - 34 unit/L Final     ALT   Date Value Ref Range Status   09/30/2024 8 0 - 55 unit/L Final     Anion Gap   Date Value Ref Range Status   09/18/2021 10 8 - 16 mmol/L Final     eGFR   Date Value Ref Range Status   09/30/2024 54 mL/min/1.73/m2 Final     Assessment:  Squamous cell carcinoma of vulva:  -presentation:  Left vulvar painful, tender mass for 1 year before seeking care in 09/2023  -vulvar biopsy 09/21/2023: Squamous cell carcinoma, extending to the deep and peripheral margins  -gyn exam 09/15/2023:  Friable, atypical borders, tender, entire left labia replaced by tumor/condylomatous appearance in several areas; left inguinal lymphadenopathy  -Gyn Onc exam 09/29/2023:  Very large vulvar mass involving the mid and posterior left labial structures with areas of ulceration, etc.; no fixation; distal vagina involved; involve the left anterior portion of the anus as well; no extension into deeper tissue; 8 cm x 5 cm;  -MRI pelvis with and without contrast 10/20/2023:  Left vulvar mass involving the left perineum and lower 2/3 of the vagina; 8.0 x 2.5 x 5.4 cm; no urethral invasion; abutment of the anterior rectal wall is indeterminate for invasion; left inguinal lymphadenopathy (2 abnormal lymph nodes, largest 2.0 cm; a 3rd lymph node similar appearance nonenlarged)  -PET-CT 10/31/2023:  No distant metastases; no intrapelvic lymphadenopathy; 2.4 cm hypermetabolic left inguinal lymph node; additionally, hypermetabolic lymph node adjacent to it as well; primary tumor, hypermetabolic, involving a portion of the vaginal canal)  To summarize:  -squamous cell carcinoma of the vulva (left labia)  -disease  involving lower 2/3 of the vagina on MRI  -left inguinal lymphadenopathy (regional lymphadenopathy), 2.0 cm on MRI pelvis, 2.4 cm on PET-CT (> 5 mm), therefore, FIGO stage IIIB  -11/01/2023: CBC normal; hemoglobin 13.7; CMP unremarkable; HIV nonreactive  -11/14/2023: Message from Dr. Harjinder Maynard's office:  Recommendation to proceed with chemoradiation therapy; if persistent disease after treatment, then, will plan surgical resection  -S/P weekly cisplatin 40 mg per m2 IV x7 (11/30/2023-01/19/2024), concurrent with radiotherapy  -S/P radiotherapy, concurrent with chemotherapy, 11/30/2023-01/26/2024  -restaging MRI pelvis 05/13/2024:  Left vulvar mass resolved; interval improvement in left inguinal lymphadenopathy   -restaging FDG PET-CT 05/20/2024:  Positive response to therapy with resolution of hypermetabolic activity along the vaginal orifice; low-level uptake within normal size left inguinal lymph node chain maybe reactive  -through chemoradiation therapy, hemoglobin dropped to 6.8 on 02/01/2024 (transfused PRBC x1 unit)  -after chemoradiation therapy, creatinine went of (1.39 on 03/07/2024; 1.15 on 02/15/2024; 1.24 on 02/01/2024, etc.)    Father experienced prostate cancer at age 70   Daughter experienced ovarian cancer at age 31    History of hysterectomy at age 37 for AUB/uterine fibroids  History of Graves disease, treated; subsequently, hypothyroid  History of tobacco abuse       Plan:  Squamous cell carcinoma of vulva  Surveillance (after completion of treatment):  1. Interval history and physical every 3-6 months for 2 years, then every 6-12 months for 3-5 years, then annually based on patient's risk of disease recurrence  2. Cervical/vaginal cytology screening for detection of lower genital tract neoplasia (may include HPV testing)  3. Imaging as indicated based on symptoms or examination findings suspicious for recurrence  4. Laboratory assessment as indicated based on symptoms or examination findings  suspicious for recurrence  5. Patient Education regarding symptoms of potential recurrence and vulvar dystrophy, periodic self examinations, lifestyle, obesity, exercise, 6 health (including vaginal dilator use and lubricant/moisturizers), smoking cessation, nutrition counseling, and potential long-term and late effects of treatment     -Follow-up with Dr. Harjinder Dumont, Gyn Onc regularly for surveillance   -Follow-up with NP in 3 months no lab work   -Continue regular follow up with Dr. Harjinder Dumont gyn Oncology for surveillance  --------------------------------    Family history of Cancer  -daughter experienced ovarian cancer at age 31  -Genetic testing-Scheduled 11/5/24    History of tobacco abuse  -importance of complete and permanent abstinence discussed and strongly emphasized  Patient quit smoking 1/9/2024    History of Graves disease  -treated with radioactive iodine in 1990 9-2000, resulting in iatrogenic hypothyroidism; subsequently, on thyroid replacement therapy   Follow-up with PCP    Above discussed at length with the patient.  All questions answered.  Plan of management discussed in detail.  She understands and agrees with this plan.

## 2024-11-05 ENCOUNTER — OFFICE VISIT (OUTPATIENT)
Dept: HEMATOLOGY/ONCOLOGY | Facility: CLINIC | Age: 59
End: 2024-11-05
Payer: MEDICAID

## 2024-11-05 DIAGNOSIS — Z80.42 FAMILY HISTORY OF PROSTATE CANCER: ICD-10-CM

## 2024-11-05 DIAGNOSIS — Z80.41 FH: OVARIAN CANCER: Primary | ICD-10-CM

## 2024-11-05 PROCEDURE — 99215 OFFICE O/P EST HI 40 MIN: CPT | Mod: 95,,, | Performed by: NURSE PRACTITIONER

## 2024-11-05 PROCEDURE — 4010F ACE/ARB THERAPY RXD/TAKEN: CPT | Mod: CPTII,95,, | Performed by: NURSE PRACTITIONER

## 2024-11-05 NOTE — PROGRESS NOTES
REFERRING PROVIDER: Dr. Wai Verduzco      Patient ID: Meghan Mclaughlin is a 59 y.o. female.    Chief Complaint: Personal diagnosis of Stage IV vulvar cancer and a family history of cancer. Patient presented via Telemedicine Visit (audio and video) today for risk assessment, genetic counseling, and consideration for genetic testing.    HPI  No past medical history on file.     Past Surgical History:   Procedure Laterality Date    HYSTERECTOMY          Review of patient's allergies indicates:  Patient has no known allergies.     Review of Systems        Problem List Items Addressed This Visit    None       Oncology History    No history exists.      Family History   Problem Relation Name Age of Onset    Thyroid cancer Mother Momma 45    Stroke Mother Momma     Prostate cancer Father Daddy 69    Arthritis Father Daddy     Diabetes Father Daddy     Heart disease Father Daddy     Hypertension Father Daddy     Arthritis Sister Margarita     Diabetes Sister Margarita     Bone cancer Maternal Grandfather      Lung cancer Paternal Grandfather      Ovarian cancer Daughter  30            Assessment:   Risk Assessment:  This patient is at increased risk of having an inherited genetic mutation that increases the risk for cancer. Patient meets criteria for genetic testing based on the National Comprehensive Cancer Network (NCCN) criteria due to a personal history of advanced stage cancer whereas genetic test result could influence treatment, with additional evidence due to family history that includes ovarian cancer (daughter) and prostate cancer (father) (see family history and pedigree). Based on the likelihood of having a mutation, BRCA1/2 Analysis with [facility] BRCA1/2 Analyses with CancerNext-Expanded+RNAinsight panel testing was described in detail.    Education and Counseling:  [facility] CancerNext-Expanded evaluates a broad number of hereditary cancer syndromes to help define patients' cancer risk. This cancer  panel tests (71 total): AIP, ALK, APC, JIM, BAP1, BARD1, BMPR1A, BRCA1, BRCA2, BRIP1, CDC73, CDH1, CDK4, CDKN1B, CDKN2A, CHEK2, DICER1, FH, FLCN, KIF1B, LZTR1, MAX, MEN1, MET, MLH1, MSH2, MSH6, MUTYH, NF1, NF2, NTHL1, PALB2, PHOX2B, PMS2, POT1, CPVKA0D, PTCH1, PTEN, RAD51C, RAD51D, RB1, RET, SDHA, SDHAF2, SDHB, SDHC, SDHD, SMAD4, SMARCA4, SMARCB1, SMARCE1, STK11, SUFU, TZBD968, TP53, TSC1, TSC2 and VHL (sequencing and deletion/duplication); AXIN2, CTNNA1, EGFR, EGLN1, HOXB13, KIT, MITF, MSH3, PDGFRA, POLD1 and POLE (sequencing only); EPCAM and GREM1 (deletion/duplication only). RNA data is routinely analyzed for use in variant interpretation for all genes.     Risks of cancer associated with inherited cancer predisposition mutations were discussed in detail.  If a mutation were found, this patient would have a significantly increased risk for cancer.  Inherited cancer syndromes included in this test, may have different, but still significant risk for cancer.  Risk of cancer with any particular gene mutation will be discussed at the time of results disclosure and based on the results.    The availability of clinical management options for inherited cancer predisposition mutation carriers was discussed, including increased surveillance, chemoprevention, and prophylactic surgery. Details of the testing process, including benefits and limitations of genetic analysis as well as the implications of possible test results, were discussed.  Because this patient is the first member of the family to be tested comprehensive panel testing was presented.  Related insurance issues were discussed.      Summary:  This patient was evaluated for hereditary risk of cancer and was found to be at an increased risk of having an inherited cancer predisposition gene mutation.  The option of genetic testing was explained in detail, including the possible impact of this information on family members.  Since this patient wishes to proceed with  testing an informed consent was obtained and blood drawn and sent to [facility].  Results will be expected 4 weeks from this time.  A follow-up appointment will be scheduled for results disclosure.       The patient location is: home.     Visit type: audiovisual    Face to Face time with patient: 29 minutes  >40 minutes of total time spent on the encounter, which includes face to face time and non-face to face time preparing to see the patient (eg, review of tests), Obtaining and/or reviewing separately obtained history, Documenting clinical information in the electronic or other health record, Independently interpreting results (not separately reported) and communicating results to the patient/family/caregiver, or Care coordination (not separately reported).       Each patient to whom he or she provides medical services by telemedicine is:  (1) informed of the relationship between the physician and patient and the respective role of any other health care provider with respect to management of the patient; and (2) notified that he or she may decline to receive medical services by telemedicine and may withdraw from such care at any time.    ERIN SANCHEZ, PhD    Answers submitted by the patient for this visit:  Review of Systems Questionnaire (Submitted on 11/4/2024)  appetite change : No  unexpected weight change: No  mouth sores: No  visual disturbance: No  cough: No  shortness of breath: No  chest pain: No  abdominal pain: No  diarrhea: No  frequency: No  back pain: No  rash: No  headaches: No  adenopathy: No  nervous/ anxious: No

## 2025-01-07 ENCOUNTER — TELEPHONE (OUTPATIENT)
Dept: HEMATOLOGY/ONCOLOGY | Facility: CLINIC | Age: 60
End: 2025-01-07
Payer: MEDICAID

## 2025-01-08 ENCOUNTER — TELEPHONE (OUTPATIENT)
Dept: HEMATOLOGY/ONCOLOGY | Facility: CLINIC | Age: 60
End: 2025-01-08
Payer: MEDICAID

## 2025-01-15 DIAGNOSIS — C51.9 SQUAMOUS CELL CARCINOMA OF VULVA: Primary | ICD-10-CM

## 2025-01-23 ENCOUNTER — TELEPHONE (OUTPATIENT)
Dept: HEMATOLOGY/ONCOLOGY | Facility: CLINIC | Age: 60
End: 2025-01-23
Payer: MEDICAID

## 2025-01-25 DIAGNOSIS — C51.9 SQUAMOUS CELL CARCINOMA OF VULVA: Primary | ICD-10-CM

## 2025-01-27 ENCOUNTER — TELEPHONE (OUTPATIENT)
Dept: HEMATOLOGY/ONCOLOGY | Facility: CLINIC | Age: 60
End: 2025-01-27
Payer: MEDICAID

## 2025-01-28 ENCOUNTER — OFFICE VISIT (OUTPATIENT)
Dept: HEMATOLOGY/ONCOLOGY | Facility: CLINIC | Age: 60
End: 2025-01-28
Payer: MEDICAID

## 2025-01-28 ENCOUNTER — APPOINTMENT (OUTPATIENT)
Dept: HEMATOLOGY/ONCOLOGY | Facility: CLINIC | Age: 60
End: 2025-01-28
Payer: MEDICAID

## 2025-01-28 DIAGNOSIS — Z86.39 HISTORY OF GRAVES' DISEASE: ICD-10-CM

## 2025-01-28 DIAGNOSIS — E87.5 HYPERKALEMIA: ICD-10-CM

## 2025-01-28 DIAGNOSIS — C51.9 SQUAMOUS CELL CARCINOMA OF VULVA: ICD-10-CM

## 2025-01-28 DIAGNOSIS — Z80.41 FH: OVARIAN CANCER: ICD-10-CM

## 2025-01-28 DIAGNOSIS — C51.9 SQUAMOUS CELL CARCINOMA OF VULVA: Primary | ICD-10-CM

## 2025-01-28 LAB
ALBUMIN SERPL-MCNC: 3.3 G/DL (ref 3.5–5)
ALBUMIN/GLOB SERPL: 0.9 RATIO (ref 1.1–2)
ALP SERPL-CCNC: 74 UNIT/L (ref 40–150)
ALT SERPL-CCNC: 6 UNIT/L (ref 0–55)
ANION GAP SERPL CALC-SCNC: 6 MEQ/L
AST SERPL-CCNC: 11 UNIT/L (ref 5–34)
BASOPHILS # BLD AUTO: 0.03 X10(3)/MCL
BASOPHILS NFR BLD AUTO: 0.7 %
BILIRUB SERPL-MCNC: 0.4 MG/DL
BUN SERPL-MCNC: 15.9 MG/DL (ref 9.8–20.1)
CALCIUM SERPL-MCNC: 9.5 MG/DL (ref 8.4–10.2)
CHLORIDE SERPL-SCNC: 106 MMOL/L (ref 98–107)
CO2 SERPL-SCNC: 25 MMOL/L (ref 22–29)
CREAT SERPL-MCNC: 1.01 MG/DL (ref 0.55–1.02)
CREAT/UREA NIT SERPL: 16
EOSINOPHIL # BLD AUTO: 0.11 X10(3)/MCL (ref 0–0.9)
EOSINOPHIL NFR BLD AUTO: 2.4 %
ERYTHROCYTE [DISTWIDTH] IN BLOOD BY AUTOMATED COUNT: 13 % (ref 11.5–17)
GFR SERPLBLD CREATININE-BSD FMLA CKD-EPI: >60 ML/MIN/1.73/M2
GLOBULIN SER-MCNC: 3.7 GM/DL (ref 2.4–3.5)
GLUCOSE SERPL-MCNC: 93 MG/DL (ref 74–100)
HCT VFR BLD AUTO: 38.9 % (ref 37–47)
HGB BLD-MCNC: 12.7 G/DL (ref 12–16)
IMM GRANULOCYTES # BLD AUTO: 0.06 X10(3)/MCL (ref 0–0.04)
IMM GRANULOCYTES NFR BLD AUTO: 1.3 %
LYMPHOCYTES # BLD AUTO: 0.76 X10(3)/MCL (ref 0.6–4.6)
LYMPHOCYTES NFR BLD AUTO: 16.7 %
MCH RBC QN AUTO: 32.1 PG (ref 27–31)
MCHC RBC AUTO-ENTMCNC: 32.6 G/DL (ref 33–36)
MCV RBC AUTO: 98.2 FL (ref 80–94)
MONOCYTES # BLD AUTO: 0.69 X10(3)/MCL (ref 0.1–1.3)
MONOCYTES NFR BLD AUTO: 15.2 %
NEUTROPHILS # BLD AUTO: 2.9 X10(3)/MCL (ref 2.1–9.2)
NEUTROPHILS NFR BLD AUTO: 63.7 %
NRBC BLD AUTO-RTO: 0 %
PLATELET # BLD AUTO: 311 X10(3)/MCL (ref 130–400)
PMV BLD AUTO: 8.5 FL (ref 7.4–10.4)
POTASSIUM SERPL-SCNC: 5.3 MMOL/L (ref 3.5–5.1)
PROT SERPL-MCNC: 7 GM/DL (ref 6.4–8.3)
RBC # BLD AUTO: 3.96 X10(6)/MCL (ref 4.2–5.4)
SODIUM SERPL-SCNC: 137 MMOL/L (ref 136–145)
WBC # BLD AUTO: 4.55 X10(3)/MCL (ref 4.5–11.5)

## 2025-01-28 PROCEDURE — 36415 COLL VENOUS BLD VENIPUNCTURE: CPT

## 2025-01-28 PROCEDURE — 85025 COMPLETE CBC W/AUTO DIFF WBC: CPT

## 2025-01-28 PROCEDURE — 80053 COMPREHEN METABOLIC PANEL: CPT

## 2025-01-28 RX ORDER — ALBUTEROL SULFATE 90 UG/1
2 INHALANT RESPIRATORY (INHALATION) EVERY 4 HOURS PRN
COMMUNITY
Start: 2025-01-19 | End: 2025-02-02

## 2025-01-28 NOTE — PROGRESS NOTES
Reason for Follow-up:  Chemotherapy teaching Cisplatin for Squamous Cell Carcinoma of Vulva    Current Treatment:  -cisplatin 40 mg per m2 IV day 1, every 7 days x7 weeks with concurrent radiotherapy  start 11/30/23    Treatment Modificatons:  1/4/24: Week 7 held due to   1/11/24: Week 8 held due to     Treatment History:  Past medical history:  Rasheeda-Parkinson-White syndrome.  Graves disease, treated with radioactive iodine in 1999 or 2000, resulting in iatrogenic hypothyroidism; subsequently, on thyroid replacement therapy.  Hypertension.  Anxiety.  Tobacco abuse.  History of abnormal Pap smears in the past; no regular follow-up.  According to her, history of abnormal Pap smears, HPV 16 positive    Surgical/procedure history:  Hysterectomy for AUB/fibroids at age 37.     Social history:  Single.  Has 3 children.  Lives in [place], [place].  Used to work as a ; does not work anymore.  Has been smoking < half a pack of cigarettes daily for 10-15 years.  Used to drink 3 beers 3 X a week; now, down to or 3 beers once a week.  No illicit drugs.    Family history:  Daughter experienced ovarian cancer at age 31.    Health maintenance:  PCP in [place], [place].  -04/25/2023: Bilateral screening mammogram (comparison:  None):  BI-RADS 1-negative  -no screening colonoscopy ever.    Gyn history:  Pregnancy x3.  Vaginal birth x3.  Hysterectomy for AUB/fibroids at age 37.      History of Present Illness:     Oncologic/Hematologic History:  Oncology History   Squamous cell carcinoma of vulva   10/31/2023 Initial Diagnosis    Squamous cell carcinoma of vulva     10/31/2023 Cancer Staged    Staging form: Vulva, AJCC 8th Edition  - Clinical stage from 10/31/2023: FIGO Stage IIIB, calculated as Stage LINH (cT3, cN2b, cM0)     11/30/2023 - 1/19/2024 Chemotherapy    Treatment Summary   Plan Name: OP GYN CISPLATIN WEEKLY  Treatment Goal: Curative  Status: Inactive  Start Date: 11/30/2023  End Date:  1/19/2024  Provider: Wai Verduzco MD  Chemotherapy: CISplatin (Platinol) 40 mg/m2 = 64 mg in sodium chloride 0.9% 629 mL chemo infusion, 40 mg/m2 = 64 mg, Intravenous, Clinic/[facility] 1 time, 6 of 6 cycles  Dose modification: 32 mg/m2 (80 % of original dose 40 mg/m2, Cycle 7, Reason: Treatment Parameters Not Met)  Administration: 64 mg (11/30/2023), 50 mg (1/19/2024), 60 mg (12/7/2023), 63 mg (12/14/2023), 63 mg (12/21/2023), 63 mg (12/28/2023)     58-year-old lady, referred by Dr. Harjinder Dumont, gyn onc, [facility], [place], [place], with squamous cell carcinoma of vulva.    Interval History 9/30/2024: Patient presents to clinic alone for scheduled follow-up surveillance visit for history of squamous cell carcinoma of the vulva . Patient reports doing well without any significant reportable symptoms. Patient does admit to chronic stable symptoms of neuropathy and fatigue. Patient denies any abnormal vaginal bleeding, vaginal lesions, LE swelling, abdominal/pelvic pain, unintentional weight loss, dyspnea, lower back pain, changes in bowel/bladder patterns, fever or any signs of infection. Lab work reviewed with patient, stable. Patient says she does still continue to have some pain duing intercourse due to dryness. Patient says she seems to believe pain is mostly related to anxiety in anticiaption of pain due to prior vaginal pain throughout radiation. Patient admits to regular follow up visits with GYN Oncology. She is aware of upcoming genetic testing. We discussed plan of care and follow up.     Review of Systems:   Review of Systems   Constitutional:  Positive for malaise/fatigue.   Respiratory:  Negative for cough and shortness of breath.    Cardiovascular:  Negative for chest pain.   Gastrointestinal:  Negative for abdominal pain and diarrhea.   Genitourinary:  Negative for frequency.   Musculoskeletal:  Negative for back pain.   Skin:  Negative for rash.   Neurological:  Positive  for tingling. Negative for headaches.   Psychiatric/Behavioral:  The patient is not nervous/anxious.    All other systems reviewed and are negative.      Physical Exam  Constitutional:       Appearance: Normal appearance.   HENT:      Head: Normocephalic.      Mouth/Throat:      Mouth: Mucous membranes are moist.   Eyes:      Conjunctiva/sclera: Conjunctivae normal.      Pupils: Pupils are equal, round, and reactive to light.   Cardiovascular:      Rate and Rhythm: Normal rate and regular rhythm.   Pulmonary:      Breath sounds: Normal breath sounds.   Abdominal:      General: Bowel sounds are normal.      Palpations: Abdomen is soft.   Musculoskeletal:      Cervical back: Normal range of motion.   Skin:     General: Skin is warm and dry.   Neurological:      Mental Status: She is alert and oriented to person, place, and time.   Psychiatric:         Mood and Affect: Mood normal.     There were no vitals filed for this visit.    Lab Results   Component Value Date    WBC 4.55 01/28/2025    RBC 3.96 (L) 01/28/2025    HGB 12.7 01/28/2025    HCT 38.9 01/28/2025    MCV 98.2 (H) 01/28/2025    MCH 32.1 (H) 01/28/2025    MCHC 32.6 (L) 01/28/2025    RDW 13.0 01/28/2025     01/28/2025    MPV 8.5 01/28/2025    EOS 0.2 02/23/2023    BASO 0.1 02/23/2023    EOSINOPHIL 4 02/23/2023   CMP  Sodium   Date Value Ref Range Status   01/28/2025 137 136 - 145 mmol/L Final   09/18/2021 138 136 - 145 mmol/L Final     Potassium   Date Value Ref Range Status   01/28/2025 5.3 (H) 3.5 - 5.1 mmol/L Final   08/24/2023 5.1 3.5 - 5.2 mmol/L Final     Chloride   Date Value Ref Range Status   01/28/2025 106 98 - 107 mmol/L Final   09/18/2021 103 100 - 109 mmol/L Final     CO2   Date Value Ref Range Status   01/28/2025 25 22 - 29 mmol/L Final     Carbon Dioxide   Date Value Ref Range Status   09/18/2021 25 22 - 33 mmol/L Final     Blood Urea Nitrogen   Date Value Ref Range Status   01/28/2025 15.9 9.8 - 20.1 mg/dL Final   09/18/2021 15 5 - 25  mg/dL Final     Creatinine   Date Value Ref Range Status   01/28/2025 1.01 0.55 - 1.02 mg/dL Final   09/18/2021 0.96 0.57 - 1.25 mg/dL Final     Calcium   Date Value Ref Range Status   01/28/2025 9.5 8.4 - 10.2 mg/dL Final   09/18/2021 9.2 8.8 - 10.6 mg/dL Final     Albumin   Date Value Ref Range Status   01/28/2025 3.3 (L) 3.5 - 5.0 g/dL Final     Bilirubin Total   Date Value Ref Range Status   01/28/2025 0.4 <=1.5 mg/dL Final     ALP   Date Value Ref Range Status   01/28/2025 74 40 - 150 unit/L Final     AST   Date Value Ref Range Status   01/28/2025 11 5 - 34 unit/L Final     ALT   Date Value Ref Range Status   01/28/2025 6 0 - 55 unit/L Final     Anion Gap   Date Value Ref Range Status   09/18/2021 10 8 - 16 mmol/L Final     eGFR   Date Value Ref Range Status   01/28/2025 >60 mL/min/1.73/m2 Final     Comment:     Estimated GFR calculated using the CKD-EPI creatinine (2021) equation.     Assessment:  Squamous cell carcinoma of vulva:  -presentation:  Left vulvar painful, tender mass for 1 year before seeking care in 09/2023  -vulvar biopsy 09/21/2023: Squamous cell carcinoma, extending to the deep and peripheral margins  -gyn exam 09/15/2023:  Friable, atypical borders, tender, entire left labia replaced by tumor/condylomatous appearance in several areas; left inguinal lymphadenopathy  -Gyn Onc exam 09/29/2023:  Very large vulvar mass involving the mid and posterior left labial structures with areas of ulceration, etc.; no fixation; distal vagina involved; involve the left anterior portion of the anus as well; no extension into deeper tissue; 8 cm x 5 cm;  -MRI pelvis with and without contrast 10/20/2023:  Left vulvar mass involving the left perineum and lower 2/3 of the vagina; 8.0 x 2.5 x 5.4 cm; no urethral invasion; abutment of the anterior rectal wall is indeterminate for invasion; left inguinal lymphadenopathy (2 abnormal lymph nodes, largest 2.0 cm; a 3rd lymph node similar appearance nonenlarged)  -PET-CT  10/31/2023:  No distant metastases; no intrapelvic lymphadenopathy; 2.4 cm hypermetabolic left inguinal lymph node; additionally, hypermetabolic lymph node adjacent to it as well; primary tumor, hypermetabolic, involving a portion of the vaginal canal)  To summarize:  -squamous cell carcinoma of the vulva (left labia)  -disease involving lower 2/3 of the vagina on MRI  -left inguinal lymphadenopathy (regional lymphadenopathy), 2.0 cm on MRI pelvis, 2.4 cm on PET-CT (> 5 mm), therefore, FIGO stage IIIB  -11/01/2023: CBC normal; hemoglobin 13.7; CMP unremarkable; HIV nonreactive  -11/14/2023: Message from Dr. Harjinder Maynard's office:  Recommendation to proceed with chemoradiation therapy; if persistent disease after treatment, then, will plan surgical resection  -S/P weekly cisplatin 40 mg per m2 IV x7 (11/30/2023-01/19/2024), concurrent with radiotherapy  -S/P radiotherapy, concurrent with chemotherapy, 11/30/2023-01/26/2024  -restaging MRI pelvis 05/13/2024:  Left vulvar mass resolved; interval improvement in left inguinal lymphadenopathy   -restaging FDG PET-CT 05/20/2024:  Positive response to therapy with resolution of hypermetabolic activity along the vaginal orifice; low-level uptake within normal size left inguinal lymph node chain maybe reactive  -through chemoradiation therapy, hemoglobin dropped to 6.8 on 02/01/2024 (transfused PRBC x1 unit)  -after chemoradiation therapy, creatinine went of (1.39 on 03/07/2024; 1.15 on 02/15/2024; 1.24 on 02/01/2024, etc.)    Father experienced prostate cancer at age 70   Daughter experienced ovarian cancer at age 31    History of hysterectomy at age 37 for AUB/uterine fibroids  History of Graves disease, treated; subsequently, hypothyroid  History of tobacco abuse       Plan:  Squamous cell carcinoma of vulva  Surveillance (after completion of treatment):  1. Interval history and physical every 3-6 months for 2 years, then every 6-12 months for 3-5 years, then annually  based on patient's risk of disease recurrence  2. Cervical/vaginal cytology screening for detection of lower genital tract neoplasia (may include HPV testing)  3. Imaging as indicated based on symptoms or examination findings suspicious for recurrence  4. Laboratory assessment as indicated based on symptoms or examination findings suspicious for recurrence  5. Patient Education regarding symptoms of potential recurrence and vulvar dystrophy, periodic self examinations, lifestyle, obesity, exercise, 6 health (including vaginal dilator use and lubricant/moisturizers), smoking cessation, nutrition counseling, and potential long-term and late effects of treatment     -Follow-up with Dr. Harjinder Dumont, Gyn Onc regularly for surveillance   -Follow-up with NP in 3 months no lab work   -Continue regular follow up with Dr. Harjinder Dumont gyn Oncology for surveillance  --------------------------------    Family history of Cancer  -daughter experienced ovarian cancer at age 31  -Genetic testing-Scheduled 11/5/24    History of tobacco abuse  -importance of complete and permanent abstinence discussed and strongly emphasized  Patient quit smoking 1/9/2024    History of Graves disease  -treated with radioactive iodine in 1990 9-2000, resulting in iatrogenic hypothyroidism; subsequently, on thyroid replacement therapy   Follow-up with PCP    Above discussed at length with the patient.  All questions answered.  Plan of management discussed in detail.  She understands and agrees with this plan.    Discussion & Plan   01/28/2025      Dx:  Squamous cell carcinoma of the vulva  Status:  Surveillance  Patient completed treatment with cisplatin and radiation.  Surveillance MRI and PET scan after treatment completion revealed resolution of mass on MRI and positive response on PET.  Currently the patient is under surveillance  She continues frequent follow-up with Dr. Harjinder roberto gyn   We will continue to see the patient in follow-up  every three-month    Dx:  History of tobacco abuse  Status:  Under good control  Patient reports she quit smoking January 9, 2024  Dx:  Graves disease  Status:  Managed by PCP  Patient is status post radioactive iodine treatment in 1990 resulting in iatrogenic hypothyroidism.  Currently on thyroid replacement therapy.  Dx:  Hyperkalemia  Status:  New diagnosis  Patient tells me she has a diet that is vision potassium and I advised the patient to reduce potassium on diet  I also reviewed with the patient medication that could contribute to the hyperkalemia and the only medication that I can see is lisinopril 10 mg.  I advised the patient to discuss with PCP.            Patient instructions and visit orders.     -Follow-up:  F/U with NP- 4/28/2025   -Labs:  CBC, CMP- 4/28/2025   -Imaging:    -Other orders:      The patient location is: Home  The chief complaint leading to consultation is:  Squamous cell carcinoma of the vulva     Visit type: audiovisual     Face to Face time with patient: 15 spent on medical discussion.     38 minutes of total time spent on the encounter, which includes face to face time and non-face to face time preparing to see the patient (eg, review of tests), Obtaining and/or reviewing separately obtained history, Documenting clinical information in the electronic or other health record, Independently interpreting results (not separately reported) and communicating results to the patient/family/caregiver, or Care coordination (not separately reported).            Each patient to whom he or she provides medical services by telemedicine is:  (1) informed of the relationship between the physician and patient and the respective role of any other health care provider with respect to management of the patient; and (2) notified that he or she may decline to receive medical services by telemedicine and may withdraw from such care at any time.     Notes:

## 2025-02-05 ENCOUNTER — DOCUMENTATION ONLY (OUTPATIENT)
Dept: HEMATOLOGY/ONCOLOGY | Facility: CLINIC | Age: 60
End: 2025-02-05
Payer: MEDICAID

## 2025-02-07 ENCOUNTER — TELEPHONE (OUTPATIENT)
Dept: HEMATOLOGY/ONCOLOGY | Facility: CLINIC | Age: 60
End: 2025-02-07
Payer: MEDICAID

## 2025-02-10 ENCOUNTER — TELEPHONE (OUTPATIENT)
Dept: HEMATOLOGY/ONCOLOGY | Facility: CLINIC | Age: 60
End: 2025-02-10
Payer: MEDICAID

## 2025-03-06 NOTE — PROGRESS NOTES
"    Reason for Visit:  Nutrition evaluation/education for chemo treatment: Squamous cell carcinoma of vulva    PMHx: No past medical history on file.    Height: 62"  Weight:   Wt Readings from Last 15 Encounters:   11/30/23 59.8 kg (131 lb 13.4 oz)   11/29/23 58.4 kg (128 lb 12.8 oz)   11/21/23 58.3 kg (128 lb 9.6 oz)   11/01/23 56.2 kg (123 lb 12.8 oz)   08/06/21 63 kg (139 lb)       Usual BW: 140 lb  Weight Change: ~ 7% wt loss x 3 months  IBW:  110 lb  BMI: 24.26 (normal)     Allergies: Patient has no known allergies.    Current Medications:    Current Outpatient Medications:   Synthroid, Megace, Lisinopril, Zofran, Vit D    Labs:  11/30/23 -- Glu 90, Na 137, K 4.8, BUN 9.8, Cr 0.84    Diet History:   11/30/23 -- Pt beginning chemo treatment for vulva cancer; pt reports decreased appetite > 1 month now improved with initiation of Megace ~ 3 weeks ago; denies n/v, reports regular Bms; pt with wt loss in last 3 months, has since regained some weight back; reports drinking 1 Ensure milkshake daily; Pt screens at low nutrition risk at this time    Nutrition Education    Education Provided:  Maximizing nutrition during cancer treatment, food safety  Teaching Method: explanation and printed materials  Comprehension: verbalizes understanding  Barriers to Learning: none evident  Expected Compliance: good  Comments: All questions were answered and dietitian's contact information was provided.        Intervention:  General Healthy diet  Monitor Weight Weekly   Consult RD prn      Monitoring:  energy intake, GI tolerance, weight, labs, and diet EDU needs    " Yes

## 2025-03-10 ENCOUNTER — OFFICE VISIT (OUTPATIENT)
Dept: HEMATOLOGY/ONCOLOGY | Facility: CLINIC | Age: 60
End: 2025-03-10
Payer: MEDICAID

## 2025-03-10 DIAGNOSIS — Z80.41 FH: OVARIAN CANCER: ICD-10-CM

## 2025-03-10 DIAGNOSIS — C51.9 SQUAMOUS CELL CARCINOMA OF VULVA: Primary | ICD-10-CM

## 2025-03-10 NOTE — PROGRESS NOTES
REFERRING PROVIDER: Dr. Wai Verduzco        Patient ID: Meghan Mclaughlin is a 60 y.o. female.    Chief Complaint: Personal diagnosis of Stage IV vulvar cancer and a family history of cancer. Patient presented for genetic counseling on 11/5/2024 and was found appropriate for genetic testing based on the National Comprehensive Cancer Network (NCCN) criteria due to a personal history of advanced stage cancer whereas genetic test result could influence treatment, with additional evidence due to family history that includes ovarian cancer (daughter) and prostate cancer (father) (see family history and pedigree). Patient signed consent, lab was drawn and sent to [facility] for BRCA1/2 Analyses with CancerNext-Expanded+RNAinsight panel testing. Patient presented via Telemedicine Visit (audio and video) today for results disclosure.     HPI  No past medical history on file.     Past Surgical History:   Procedure Laterality Date    HYSTERECTOMY          Review of patient's allergies indicates:  No Known Allergies     Review of Systems   Constitutional:  Negative for appetite change and unexpected weight change.   HENT:  Negative for mouth sores.    Eyes:  Negative for visual disturbance.   Respiratory:  Negative for cough and shortness of breath.    Cardiovascular:  Negative for chest pain.   Gastrointestinal:  Negative for abdominal pain and diarrhea.   Genitourinary:  Negative for frequency.   Musculoskeletal:  Negative for back pain.   Integumentary:  Negative for rash.   Neurological:  Negative for headaches.   Hematological:  Negative for adenopathy.   Psychiatric/Behavioral:  The patient is not nervous/anxious.              Problem List Items Addressed This Visit    None    Oncology History   Squamous cell carcinoma of vulva   10/31/2023 Initial Diagnosis    Squamous cell carcinoma of vulva     10/31/2023 Cancer Staged    Staging form: Vulva, AJCC 8th Edition  - Clinical stage from 10/31/2023: FIGO Stage IIIB,  calculated as Stage LINH (cT3, cN2b, cM0)     11/30/2023 - 1/19/2024 Chemotherapy    Treatment Summary   Plan Name: OP GYN CISPLATIN WEEKLY  Treatment Goal: Curative  Status: Inactive  Start Date: 11/30/2023  End Date: 1/19/2024  Provider: Wai Verduzco MD  Chemotherapy: CISplatin (Platinol) 40 mg/m2 = 64 mg in sodium chloride 0.9% 629 mL chemo infusion, 40 mg/m2 = 64 mg, Intravenous, Clinic[facility] 1 time, 6 of 6 cycles  Dose modification: 32 mg/m2 (80 % of original dose 40 mg/m2, Cycle 7, Reason: Treatment Parameters Not Met)  Administration: 64 mg (11/30/2023), 50 mg (1/19/2024), 60 mg (12/7/2023), 63 mg (12/14/2023), 63 mg (12/21/2023), 63 mg (12/28/2023)              Family History   Problem Relation Name Age of Onset    Thyroid cancer Mother Momma 45    Stroke Mother Momma     Prostate cancer Father Daddy 69    Arthritis Father Daddy     Diabetes Father Daddy     Heart disease Father Daddy     Hypertension Father Daddy     Arthritis Sister Margarita     Diabetes Sister Margarita     Bone cancer Maternal Grandfather      Lung cancer Paternal Grandfather      Ovarian cancer Daughter  30        Assessment:   Genetic testing was appropriate for this patient because of personal and family history. This comprehensive [facility] CancerNext-Expanded analysis indicated that there was no deleterious mutation and no variants of uncertain significance found in (76 total): AIP, ALK, APC, JIM, BAP1, BARD1, BMPR1A, BRCA1, BRCA2, BRIP1, CDC73, CDH1, CDK4, CDKN1B, CDKN2A, CEBPA, CHEK2, DICER1, ETV6, FH, FLCN, GATA2, LZTR1, MAX, MEN1, MET, MLH1, MSH2, MSH6, MUTYH, NF1, NF2, NTHL1, PALB2, PHOX2B, PMS2, POT1, ZAUXK3B, PTCH1, PTEN, RAD51C, RAD51D, RB1, RET, RUNX1, SDHA, SDHAF2, SDHB, SDHC, SDHD, SMAD4, SMARCA4, SMARCB1, SMARCE1, STK11, SUFU, ZUYK224, TP53, TSC1, TSC2, VHL and WT1 (sequencing and deletion/duplication); AXIN2, CTNNA1, DDX41, EGFR, HOXB13, KIT, MBD4, MITF, MSH3, PDGFRA, POLD1 and POLE (sequencing only); EPCAM and GREM1  (deletion/duplication only). RNA data is routinely analyzed for use in variant interpretation for all genes.    It was explained to the patient, that, although this analysis indicated a negative result, there are other, rare genetic abnormalities that this test will not detect. This result, however, rules out the majority of abnormalities believed to be responsible for hereditary susceptibility to cancer.    A copy of the result will be emailed to the patient: [email or web address]     The patient location is: home     Visit type: audiovisual    Face to Face time with patient: 10 minutes   20 minutes of total time spent on the encounter, which includes face to face time and non-face to face time preparing to see the patient (eg, review of tests), Obtaining and/or reviewing separately obtained history, Documenting clinical information in the electronic or other health record, Independently interpreting results (not separately reported) and communicating results to the patient/family/caregiver, or Care coordination (not separately reported).         Each patient to whom he or she provides medical services by telemedicine is:  (1) informed of the relationship between the physician and patient and the respective role of any other health care provider with respect to management of the patient; and (2) notified that he or she may decline to receive medical services by telemedicine and may withdraw from such care at any time.    ERIN SANCHEZ, PhD

## 2025-04-30 DIAGNOSIS — Z80.41 FH: OVARIAN CANCER: ICD-10-CM

## 2025-04-30 DIAGNOSIS — C51.9 SQUAMOUS CELL CARCINOMA OF VULVA: Primary | ICD-10-CM

## 2025-05-22 NOTE — PROGRESS NOTES
[facility] Hematology/Oncology  PROGRESS NOTE    Subjective:       Patient ID: Meghan Mclaughlin is a 60 y.o. female.    Diagnosis:   Squamous Cell Carcinoma of Vulva     Current Treatment:  surveillance    Treatment History:  -S/P weekly cisplatin 40 mg per m2 IV x7 (11/30/2023-01/19/2024), concurrent with radiotherapy  -S/P radiotherapy, concurrent with chemotherapy, 11/30/2023-01/26/2024    Chief Complaint: Follow-up (Patient reports no new concerns as of today.)    HPI:  58-year-old lady, referred by Dr. Harjinder Dumont, gyn onc, [facility], [place], [place], with squamous cell carcinoma of vulva.     Interval History:  Patient presents to clinic for a surveillance visit for history of squamous cell carcinoma of the vulva.  She denies any abnormal vaginal bleeding, vaginal lesions, abdominal/pelvic pain, unintentional weight loss, shortness of breath, lower back pain, changes in bowel/bladder patterns, fever or signs of infection.  She continues to follow-up with gyn Dr. Dumont every 6 months, next follow-up is on 06/04/2025.  She also continues to follow-up with radiation oncology.  Labs and plan of care reviewed in detail with patient, verbalized understanding.    PMX/PSHx  History reviewed. No pertinent past medical history.   Past Surgical History:   Procedure Laterality Date    HYSTERECTOMY       Allergies:  Review of patient's allergies indicates:  No Known Allergies   Social History:   Social History[1]  Medications:  Current Outpatient Medications   Medication Instructions    albuterol (PROVENTIL/VENTOLIN HFA) 90 mcg/actuation inhaler 2 puffs, Every 4 hours PRN    ALPRAZolam (XANAX) 0.25 MG tablet 1 tablet, Daily PRN    diltiaZEM (CARDIZEM CD) 180 mg    levothyroxine (SYNTHROID) 125 MCG tablet 1 tablet, Every morning    lisinopriL 10 mg     ROS:  Review of Systems   Constitutional:  Negative for appetite change, chills, fatigue, fever and unexpected weight change.   HENT:  Negative for  facial swelling, mouth sores, nosebleeds, sinus pressure/congestion and sore throat.    Eyes:  Negative for photophobia, pain and visual disturbance.   Respiratory:  Negative for cough, chest tightness, shortness of breath and wheezing.    Cardiovascular:  Negative for chest pain, palpitations and leg swelling.   Gastrointestinal:  Negative for abdominal pain, blood in stool, change in bowel habit, constipation, diarrhea, nausea and vomiting.   Endocrine: Negative.    Genitourinary:  Negative for dysuria, frequency, hematuria, hot flashes, pelvic pain, urgency and vaginal pain.   Musculoskeletal:  Negative for arthralgias, back pain, gait problem, joint swelling and myalgias.   Integumentary:  Negative for pallor, rash, wound, breast mass, breast discharge and breast tenderness.   Allergic/Immunologic: Negative.  Negative for immunocompromised state.   Neurological:  Negative for dizziness, vertigo, syncope, weakness, numbness and headaches.   Hematological:  Negative for adenopathy. Does not bruise/bleed easily.   Psychiatric/Behavioral:  Negative for behavioral problems and dysphoric mood. The patient is not nervous/anxious.    All other systems reviewed and are negative.  Breast: Negative for mass and tenderness      Objective:   Vitals:  Vitals:    05/23/25 0944   BP: (!) 145/78   Pulse: 64   Resp: 18   Temp: 97.7 °F (36.5 °C)      Wt Readings from Last 3 Encounters:   05/23/25 0944 62.1 kg (136 lb 12.8 oz)   09/30/24 1315 59.7 kg (131 lb 9.6 oz)   06/13/24 1353 58.6 kg (129 lb 3 oz)      Physical Examination:  Physical Exam  Vitals and nursing note reviewed.   HENT:      Head: Normocephalic and atraumatic.      Mouth/Throat:      Mouth: Mucous membranes are moist.      Pharynx: Oropharynx is clear.   Eyes:      Extraocular Movements: Extraocular movements intact.      Conjunctiva/sclera: Conjunctivae normal.      Pupils: Pupils are equal, round, and reactive to light.   Cardiovascular:      Rate and Rhythm:  Normal rate and regular rhythm.   Pulmonary:      Effort: Pulmonary effort is normal.      Breath sounds: Normal breath sounds.   Abdominal:      General: Abdomen is flat. Bowel sounds are normal.      Palpations: Abdomen is soft.   Musculoskeletal:         General: Normal range of motion.      Cervical back: Normal range of motion and neck supple.   Skin:     General: Skin is warm and dry.   Neurological:      General: No focal deficit present.      Mental Status: She is alert.   Psychiatric:         Mood and Affect: Mood normal.       ECOG SCORE           Labs:  Lab Visit on 05/23/2025   Component Date Value Ref Range Status    Sodium 05/23/2025 137  136 - 145 mmol/L Final    Potassium 05/23/2025 4.4  3.5 - 5.1 mmol/L Final    Chloride 05/23/2025 105  98 - 107 mmol/L Final    CO2 05/23/2025 24  22 - 29 mmol/L Final    Glucose 05/23/2025 95  74 - 100 mg/dL Final    Blood Urea Nitrogen 05/23/2025 13.3  9.8 - 20.1 mg/dL Final    Creatinine 05/23/2025 1.25 (H)  0.55 - 1.02 mg/dL Final    Calcium 05/23/2025 9.7  8.4 - 10.2 mg/dL Final    Protein Total 05/23/2025 7.6  6.4 - 8.3 gm/dL Final    Albumin 05/23/2025 3.8  3.5 - 5.0 g/dL Final    Globulin 05/23/2025 3.8 (H)  2.4 - 3.5 gm/dL Final    Albumin/Globulin Ratio 05/23/2025 1.0 (L)  1.1 - 2.0 ratio Final    Bilirubin Total 05/23/2025 0.5  <=1.5 mg/dL Final    ALP 05/23/2025 82  40 - 150 unit/L Final    ALT 05/23/2025 8  0 - 55 unit/L Final    AST 05/23/2025 13  11 - 45 unit/L Final    eGFR 05/23/2025 50  mL/min/1.73/m2 Final    Estimated GFR calculated using the CKD-EPI creatinine (2021) equation.    Anion Gap 05/23/2025 8.0  mEq/L Final    BUN/Creatinine Ratio 05/23/2025 11   Final    Magnesium Level 05/23/2025 2.00  1.60 - 2.60 mg/dL Final    WBC 05/23/2025 3.77 (L)  4.50 - 11.50 x10(3)/mcL Final    RBC 05/23/2025 4.53  4.20 - 5.40 x10(6)/mcL Final    Hgb 05/23/2025 14.6  12.0 - 16.0 g/dL Final    Hct 05/23/2025 44.4  37.0 - 47.0 % Final    MCV 05/23/2025 98.0 (H)   80.0 - 94.0 fL Final    MCH 05/23/2025 32.2 (H)  27.0 - 31.0 pg Final    MCHC 05/23/2025 32.9 (L)  33.0 - 36.0 g/dL Final    RDW 05/23/2025 13.8  11.5 - 17.0 % Final    Platelet 05/23/2025 286  130 - 400 x10(3)/mcL Final    MPV 05/23/2025 8.4  7.4 - 10.4 fL Final    Neut % 05/23/2025 58.7  % Final    Lymph % 05/23/2025 23.3  % Final    Mono % 05/23/2025 11.9  % Final    Eos % 05/23/2025 4.5  % Final    Basophil % 05/23/2025 1.1  % Final    Imm Grans % 05/23/2025 0.5  % Final    Neut # 05/23/2025 2.21  2.1 - 9.2 x10(3)/mcL Final    Lymph # 05/23/2025 0.88  0.6 - 4.6 x10(3)/mcL Final    Mono # 05/23/2025 0.45  0.1 - 1.3 x10(3)/mcL Final    Eos # 05/23/2025 0.17  0 - 0.9 x10(3)/mcL Final    Baso # 05/23/2025 0.04  <=0.2 x10(3)/mcL Final    Imm Gran # 05/23/2025 0.02  0.00 - 0.04 x10(3)/mcL Final    NRBC% 05/23/2025 0.0  % Final        Pathology:  -vulvar biopsy 09/21/2023: Squamous cell carcinoma, extending to the deep and peripheral margins   -squamous cell carcinoma of the vulva (left labia)  -disease involving lower 2/3 of the vagina on MRI  -left inguinal lymphadenopathy (regional lymphadenopathy), 2.0 cm on MRI pelvis, 2.4 cm on PET-CT (> 5 mm), therefore, FIGO stage IIIB    Radiology/Diagnostics:  -MRI pelvis with and without contrast 10/20/2023:  Left vulvar mass involving the left perineum and lower 2/3 of the vagina; 8.0 x 2.5 x 5.4 cm; no urethral invasion; abutment of the anterior rectal wall is indeterminate for invasion; left inguinal lymphadenopathy (2 abnormal lymph nodes, largest 2.0 cm; a 3rd lymph node similar appearance nonenlarged)  -PET-CT 10/31/2023:  No distant metastases; no intrapelvic lymphadenopathy; 2.4 cm hypermetabolic left inguinal lymph node; additionally, hypermetabolic lymph node adjacent to it as well; primary tumor, hypermetabolic, involving a portion of the vaginal canal)    restaging MRI pelvis 05/13/2024:  Left vulvar mass resolved; interval improvement in left inguinal  lymphadenopathy   -restaging FDG PET-CT 05/20/2024:  Positive response to therapy with resolution of hypermetabolic activity along the vaginal orifice; low-level uptake within normal size left inguinal lymph node chain maybe reactive    I have reviewed all available lab results and radiology reports.  Assessment:   Squamous cell carcinoma of vulva  Continue follow up with GYN Dr. Dumont 6/4/2025  Continue follow up with radiation oncology  RTC in 3 months labs/NP  Problem List Items Addressed This Visit       Squamous cell carcinoma of vulva - Primary    History of hysterectomy    History of Graves' disease    Leukopenia         Plan:    05/23/2025  Labs and exam stable  Continue follow up with GYN Dr. Dumont 6/4/2025  Continue follow up with radiation oncology  RTC in 3 months labs/NP  CBC CMP Mg      Providers:         Assessment:  Squamous cell carcinoma of vulva:  -presentation:  Left vulvar painful, tender mass for 1 year before seeking care in 09/2023  -vulvar biopsy 09/21/2023: Squamous cell carcinoma, extending to the deep and peripheral margins  -gyn exam 09/15/2023:  Friable, atypical borders, tender, entire left labia replaced by tumor/condylomatous appearance in several areas; left inguinal lymphadenopathy  -Gyn Onc exam 09/29/2023:  Very large vulvar mass involving the mid and posterior left labial structures with areas of ulceration, etc.; no fixation; distal vagina involved; involve the left anterior portion of the anus as well; no extension into deeper tissue; 8 cm x 5 cm;  -MRI pelvis with and without contrast 10/20/2023:  Left vulvar mass involving the left perineum and lower 2/3 of the vagina; 8.0 x 2.5 x 5.4 cm; no urethral invasion; abutment of the anterior rectal wall is indeterminate for invasion; left inguinal lymphadenopathy (2 abnormal lymph nodes, largest 2.0 cm; a 3rd lymph node similar appearance nonenlarged)  -PET-CT 10/31/2023:  No distant metastases; no intrapelvic lymphadenopathy; 2.4  cm hypermetabolic left inguinal lymph node; additionally, hypermetabolic lymph node adjacent to it as well; primary tumor, hypermetabolic, involving a portion of the vaginal canal)  To summarize:  -squamous cell carcinoma of the vulva (left labia)  -disease involving lower 2/3 of the vagina on MRI  -left inguinal lymphadenopathy (regional lymphadenopathy), 2.0 cm on MRI pelvis, 2.4 cm on PET-CT (> 5 mm), therefore, FIGO stage IIIB  -11/01/2023: CBC normal; hemoglobin 13.7; CMP unremarkable; HIV nonreactive  -11/14/2023: Message from Dr. Harjinder Maynard's office:  Recommendation to proceed with chemoradiation therapy; if persistent disease after treatment, then, will plan surgical resection  -S/P weekly cisplatin 40 mg per m2 IV x7 (11/30/2023-01/19/2024), concurrent with radiotherapy  -S/P radiotherapy, concurrent with chemotherapy, 11/30/2023-01/26/2024  -restaging MRI pelvis 05/13/2024:  Left vulvar mass resolved; interval improvement in left inguinal lymphadenopathy   -restaging FDG PET-CT 05/20/2024:  Positive response to therapy with resolution of hypermetabolic activity along the vaginal orifice; low-level uptake within normal size left inguinal lymph node chain maybe reactive  -through chemoradiation therapy, hemoglobin dropped to 6.8 on 02/01/2024 (transfused PRBC x1 unit)  -after chemoradiation therapy, creatinine went of (1.39 on 03/07/2024; 1.15 on 02/15/2024; 1.24 on 02/01/2024, etc.)     Father experienced prostate cancer at age 70   Daughter experienced ovarian cancer at age 31     History of hysterectomy at age 37 for AUB/uterine fibroids  History of Graves disease, treated; subsequently, hypothyroid  History of tobacco abuse        Plan:  Squamous cell carcinoma of vulva  Surveillance (after completion of treatment):  1. Interval history and physical every 3-6 months for 2 years, then every 6-12 months for 3-5 years, then annually based on patient's risk of disease recurrence  2. Cervical/vaginal  cytology screening for detection of lower genital tract neoplasia (may include HPV testing)  3. Imaging as indicated based on symptoms or examination findings suspicious for recurrence  4. Laboratory assessment as indicated based on symptoms or examination findings suspicious for recurrence  5. Patient Education regarding symptoms of potential recurrence and vulvar dystrophy, periodic self examinations, lifestyle, obesity, exercise, 6 health (including vaginal dilator use and lubricant/moisturizers), smoking cessation, nutrition counseling, and potential long-term and late effects of treatment      -Follow-up with Dr. Harjinder Dumont, Gyn Onc regularly for surveillance   -Follow-up with NP in 3 months no lab work   -Continue regular follow up with Dr. Harjinder Dumont gyn Oncology for surveillance  --------------------------------     Family history of Cancer  -daughter experienced ovarian cancer at age 31  -Genetic testing-Scheduled 24     History of tobacco abuse  -importance of complete and permanent abstinence discussed and strongly emphasized  Patient quit smoking 2024     History of Graves disease  -treated with radioactive iodine in 1990, resulting in iatrogenic hypothyroidism; subsequently, on thyroid replacement therapy   Follow-up with PCP     Above discussed at length with the patient.  All questions answered.  Plan of management discussed in detail.  She understands and agrees with this plan.        I have explained all of the above in detail and the patient understands all of the current recommendation(s). I have answered all of their questions to the best of my ability and to their complete satisfaction.       Jumana Steel, FNP-C  Oncology/Hematology   [facility]           [1]   Social History  Tobacco Use    Smoking status: Former     Current packs/day: 0.00     Types: Cigarettes     Quit date: 2023     Years since quittin.4    Smokeless tobacco: Never    Substance Use Topics    Alcohol use: Yes     Alcohol/week: 2.0 standard drinks of alcohol     Types: 2 Cans of beer per week    Drug use: Never

## 2025-05-23 ENCOUNTER — OFFICE VISIT (OUTPATIENT)
Dept: HEMATOLOGY/ONCOLOGY | Facility: CLINIC | Age: 60
End: 2025-05-23
Payer: MEDICAID

## 2025-05-23 ENCOUNTER — LAB VISIT (OUTPATIENT)
Dept: HEMATOLOGY/ONCOLOGY | Facility: CLINIC | Age: 60
End: 2025-05-23
Payer: MEDICAID

## 2025-05-23 VITALS
DIASTOLIC BLOOD PRESSURE: 78 MMHG | HEIGHT: 62 IN | SYSTOLIC BLOOD PRESSURE: 145 MMHG | BODY MASS INDEX: 25.18 KG/M2 | WEIGHT: 136.81 LBS | OXYGEN SATURATION: 98 % | TEMPERATURE: 98 F | RESPIRATION RATE: 18 BRPM | HEART RATE: 64 BPM

## 2025-05-23 DIAGNOSIS — Z90.710 HISTORY OF HYSTERECTOMY: ICD-10-CM

## 2025-05-23 DIAGNOSIS — D72.819 LEUKOPENIA, UNSPECIFIED TYPE: ICD-10-CM

## 2025-05-23 DIAGNOSIS — Z86.39 HISTORY OF GRAVES' DISEASE: ICD-10-CM

## 2025-05-23 DIAGNOSIS — Z80.41 FH: OVARIAN CANCER: ICD-10-CM

## 2025-05-23 DIAGNOSIS — C51.9 SQUAMOUS CELL CARCINOMA OF VULVA: Primary | ICD-10-CM

## 2025-05-23 DIAGNOSIS — C51.9 SQUAMOUS CELL CARCINOMA OF VULVA: ICD-10-CM

## 2025-05-23 LAB
ALBUMIN SERPL-MCNC: 3.8 G/DL (ref 3.5–5)
ALBUMIN/GLOB SERPL: 1 RATIO (ref 1.1–2)
ALP SERPL-CCNC: 82 UNIT/L (ref 40–150)
ALT SERPL-CCNC: 8 UNIT/L (ref 0–55)
ANION GAP SERPL CALC-SCNC: 8 MEQ/L
AST SERPL-CCNC: 13 UNIT/L (ref 11–45)
BASOPHILS # BLD AUTO: 0.04 X10(3)/MCL
BASOPHILS NFR BLD AUTO: 1.1 %
BILIRUB SERPL-MCNC: 0.5 MG/DL
BUN SERPL-MCNC: 13.3 MG/DL (ref 9.8–20.1)
CALCIUM SERPL-MCNC: 9.7 MG/DL (ref 8.4–10.2)
CHLORIDE SERPL-SCNC: 105 MMOL/L (ref 98–107)
CO2 SERPL-SCNC: 24 MMOL/L (ref 22–29)
CREAT SERPL-MCNC: 1.25 MG/DL (ref 0.55–1.02)
CREAT/UREA NIT SERPL: 11
EOSINOPHIL # BLD AUTO: 0.17 X10(3)/MCL (ref 0–0.9)
EOSINOPHIL NFR BLD AUTO: 4.5 %
ERYTHROCYTE [DISTWIDTH] IN BLOOD BY AUTOMATED COUNT: 13.8 % (ref 11.5–17)
GFR SERPLBLD CREATININE-BSD FMLA CKD-EPI: 50 ML/MIN/1.73/M2
GLOBULIN SER-MCNC: 3.8 GM/DL (ref 2.4–3.5)
GLUCOSE SERPL-MCNC: 95 MG/DL (ref 74–100)
HCT VFR BLD AUTO: 44.4 % (ref 37–47)
HGB BLD-MCNC: 14.6 G/DL (ref 12–16)
IMM GRANULOCYTES # BLD AUTO: 0.02 X10(3)/MCL (ref 0–0.04)
IMM GRANULOCYTES NFR BLD AUTO: 0.5 %
LYMPHOCYTES # BLD AUTO: 0.88 X10(3)/MCL (ref 0.6–4.6)
LYMPHOCYTES NFR BLD AUTO: 23.3 %
MAGNESIUM SERPL-MCNC: 2 MG/DL (ref 1.6–2.6)
MCH RBC QN AUTO: 32.2 PG (ref 27–31)
MCHC RBC AUTO-ENTMCNC: 32.9 G/DL (ref 33–36)
MCV RBC AUTO: 98 FL (ref 80–94)
MONOCYTES # BLD AUTO: 0.45 X10(3)/MCL (ref 0.1–1.3)
MONOCYTES NFR BLD AUTO: 11.9 %
NEUTROPHILS # BLD AUTO: 2.21 X10(3)/MCL (ref 2.1–9.2)
NEUTROPHILS NFR BLD AUTO: 58.7 %
NRBC BLD AUTO-RTO: 0 %
PLATELET # BLD AUTO: 286 X10(3)/MCL (ref 130–400)
PMV BLD AUTO: 8.4 FL (ref 7.4–10.4)
POTASSIUM SERPL-SCNC: 4.4 MMOL/L (ref 3.5–5.1)
PROT SERPL-MCNC: 7.6 GM/DL (ref 6.4–8.3)
RBC # BLD AUTO: 4.53 X10(6)/MCL (ref 4.2–5.4)
SODIUM SERPL-SCNC: 137 MMOL/L (ref 136–145)
WBC # BLD AUTO: 3.77 X10(3)/MCL (ref 4.5–11.5)

## 2025-05-23 PROCEDURE — 83735 ASSAY OF MAGNESIUM: CPT

## 2025-05-23 PROCEDURE — 85025 COMPLETE CBC W/AUTO DIFF WBC: CPT

## 2025-05-23 PROCEDURE — 99213 OFFICE O/P EST LOW 20 MIN: CPT | Mod: PBBFAC

## 2025-05-23 PROCEDURE — 80053 COMPREHEN METABOLIC PANEL: CPT

## 2025-06-04 ENCOUNTER — DOCUMENTATION ONLY (OUTPATIENT)
Dept: HEMATOLOGY/ONCOLOGY | Facility: CLINIC | Age: 60
End: 2025-06-04
Payer: MEDICAID

## 2025-08-22 ENCOUNTER — TELEPHONE (OUTPATIENT)
Dept: HEMATOLOGY/ONCOLOGY | Facility: CLINIC | Age: 60
End: 2025-08-22
Payer: MEDICAID

## 2025-08-25 ENCOUNTER — OFFICE VISIT (OUTPATIENT)
Dept: HEMATOLOGY/ONCOLOGY | Facility: CLINIC | Age: 60
End: 2025-08-25
Payer: MEDICAID

## 2025-08-25 VITALS
BODY MASS INDEX: 25.14 KG/M2 | HEIGHT: 62 IN | SYSTOLIC BLOOD PRESSURE: 133 MMHG | HEART RATE: 63 BPM | TEMPERATURE: 98 F | DIASTOLIC BLOOD PRESSURE: 82 MMHG | WEIGHT: 136.63 LBS | OXYGEN SATURATION: 99 % | RESPIRATION RATE: 16 BRPM

## 2025-08-25 DIAGNOSIS — Z90.710 HISTORY OF HYSTERECTOMY: ICD-10-CM

## 2025-08-25 DIAGNOSIS — C51.9 SQUAMOUS CELL CARCINOMA OF VULVA: Primary | ICD-10-CM

## 2025-08-25 PROCEDURE — 99214 OFFICE O/P EST MOD 30 MIN: CPT | Mod: PBBFAC

## 2025-08-25 RX ORDER — ERGOCALCIFEROL 1.25 MG/1
50000 CAPSULE ORAL
COMMUNITY

## 2025-08-25 RX ORDER — LEVOTHYROXINE SODIUM 112 UG/1
112 TABLET ORAL EVERY MORNING
COMMUNITY